# Patient Record
Sex: MALE | Race: BLACK OR AFRICAN AMERICAN | NOT HISPANIC OR LATINO | Employment: OTHER | URBAN - METROPOLITAN AREA
[De-identification: names, ages, dates, MRNs, and addresses within clinical notes are randomized per-mention and may not be internally consistent; named-entity substitution may affect disease eponyms.]

---

## 2017-03-03 ENCOUNTER — LAB VISIT (OUTPATIENT)
Dept: LAB | Facility: HOSPITAL | Age: 62
End: 2017-03-03
Attending: INTERNAL MEDICINE
Payer: COMMERCIAL

## 2017-03-03 DIAGNOSIS — I10 ESSENTIAL HYPERTENSION: Chronic | ICD-10-CM

## 2017-03-03 DIAGNOSIS — C61 PROSTATE CANCER: Chronic | ICD-10-CM

## 2017-03-03 DIAGNOSIS — E78.5 HYPERLIPIDEMIA, UNSPECIFIED HYPERLIPIDEMIA TYPE: ICD-10-CM

## 2017-03-03 LAB
ALBUMIN SERPL BCP-MCNC: 3.7 G/DL
ALP SERPL-CCNC: 50 U/L
ALT SERPL W/O P-5'-P-CCNC: 27 U/L
ANION GAP SERPL CALC-SCNC: 5 MMOL/L
AST SERPL-CCNC: 27 U/L
BILIRUB SERPL-MCNC: 0.7 MG/DL
BUN SERPL-MCNC: 20 MG/DL
CALCIUM SERPL-MCNC: 9.4 MG/DL
CHLORIDE SERPL-SCNC: 107 MMOL/L
CHOLEST/HDLC SERPL: 4.5 {RATIO}
CO2 SERPL-SCNC: 30 MMOL/L
COMPLEXED PSA SERPL-MCNC: 0.49 NG/ML
CREAT SERPL-MCNC: 1.2 MG/DL
EST. GFR  (AFRICAN AMERICAN): >60 ML/MIN/1.73 M^2
EST. GFR  (NON AFRICAN AMERICAN): >60 ML/MIN/1.73 M^2
GLUCOSE SERPL-MCNC: 99 MG/DL
HDL/CHOLESTEROL RATIO: 22.3 %
HDLC SERPL-MCNC: 157 MG/DL
HDLC SERPL-MCNC: 35 MG/DL
LDLC SERPL CALC-MCNC: 105.8 MG/DL
NONHDLC SERPL-MCNC: 122 MG/DL
POTASSIUM SERPL-SCNC: 4.4 MMOL/L
PROT SERPL-MCNC: 6.7 G/DL
SODIUM SERPL-SCNC: 142 MMOL/L
TESTOST SERPL-MCNC: 364 NG/DL
TRIGL SERPL-MCNC: 81 MG/DL

## 2017-03-03 PROCEDURE — 36415 COLL VENOUS BLD VENIPUNCTURE: CPT | Mod: PO

## 2017-03-03 PROCEDURE — 84153 ASSAY OF PSA TOTAL: CPT

## 2017-03-03 PROCEDURE — 84403 ASSAY OF TOTAL TESTOSTERONE: CPT

## 2017-03-03 PROCEDURE — 80053 COMPREHEN METABOLIC PANEL: CPT

## 2017-03-03 PROCEDURE — 80061 LIPID PANEL: CPT

## 2017-03-07 ENCOUNTER — OFFICE VISIT (OUTPATIENT)
Dept: INTERNAL MEDICINE | Facility: CLINIC | Age: 62
End: 2017-03-07
Payer: COMMERCIAL

## 2017-03-07 VITALS
BODY MASS INDEX: 28.92 KG/M2 | WEIGHT: 206.56 LBS | HEART RATE: 60 BPM | SYSTOLIC BLOOD PRESSURE: 128 MMHG | DIASTOLIC BLOOD PRESSURE: 80 MMHG | TEMPERATURE: 98 F | HEIGHT: 71 IN

## 2017-03-07 DIAGNOSIS — I25.10 CORONARY ARTERY CALCIFICATION SEEN ON CAT SCAN: Chronic | ICD-10-CM

## 2017-03-07 DIAGNOSIS — J31.0 CHRONIC RHINITIS: Chronic | ICD-10-CM

## 2017-03-07 DIAGNOSIS — E78.5 HYPERLIPIDEMIA, UNSPECIFIED HYPERLIPIDEMIA TYPE: ICD-10-CM

## 2017-03-07 DIAGNOSIS — D56.1 THALASSEMIA, BETA: ICD-10-CM

## 2017-03-07 DIAGNOSIS — I10 ESSENTIAL HYPERTENSION: Primary | Chronic | ICD-10-CM

## 2017-03-07 DIAGNOSIS — C61 PROSTATE CANCER: Chronic | ICD-10-CM

## 2017-03-07 PROCEDURE — 99999 PR PBB SHADOW E&M-EST. PATIENT-LVL III: CPT | Mod: PBBFAC,,, | Performed by: INTERNAL MEDICINE

## 2017-03-07 PROCEDURE — 3079F DIAST BP 80-89 MM HG: CPT | Mod: S$GLB,,, | Performed by: INTERNAL MEDICINE

## 2017-03-07 PROCEDURE — 99214 OFFICE O/P EST MOD 30 MIN: CPT | Mod: S$GLB,,, | Performed by: INTERNAL MEDICINE

## 2017-03-07 PROCEDURE — 3074F SYST BP LT 130 MM HG: CPT | Mod: S$GLB,,, | Performed by: INTERNAL MEDICINE

## 2017-03-07 PROCEDURE — 1160F RVW MEDS BY RX/DR IN RCRD: CPT | Mod: S$GLB,,, | Performed by: INTERNAL MEDICINE

## 2017-03-07 RX ORDER — LATANOPROST 50 UG/ML
SOLUTION/ DROPS OPHTHALMIC
Refills: 3 | COMMUNITY
Start: 2017-03-04

## 2017-03-13 PROBLEM — I25.10 CORONARY ARTERY CALCIFICATION SEEN ON CAT SCAN: Chronic | Status: ACTIVE | Noted: 2017-03-13

## 2017-03-13 NOTE — PROGRESS NOTES
Subjective:       Patient ID: Dani Gastelum Jr. is a 61 y.o. male.    Chief Complaint: Hypertension and Knee Pain    HPI   The patient presents for follow-up of hypertension and other medical conditions.  He is tolerating his blood pressure medication well without side effects.  He does exercise although he states his exercise limited due to knee pain.  He has bilateral knee joint pain.  He has had arthroscopic surgeries in the past.  He did not obtain relief with use of Synvisc.  He has coronary artery calcifications as seen on CT scan.  A stress test however was negative.  He has not experienced any exertional chest pain or dyspnea.  He also has chronic rhinitis, hyperlipidemia, prostate cancer which is being monitored by his urologist and thalassemia minor.  Review of Systems   Constitutional: Negative for activity change, appetite change, fatigue and unexpected weight change.   HENT: Positive for congestion and postnasal drip. Negative for sinus pressure and sore throat.    Eyes: Negative for visual disturbance.   Respiratory: Negative for cough, chest tightness, shortness of breath and wheezing.    Cardiovascular: Negative for chest pain, palpitations and leg swelling.   Gastrointestinal: Negative for abdominal pain, blood in stool, nausea and vomiting.   Genitourinary: Negative for dysuria, hematuria and urgency.   Musculoskeletal: Positive for arthralgias. Negative for back pain, gait problem, joint swelling, myalgias and neck stiffness.   Skin: Negative for color change and rash.   Neurological: Negative for dizziness, syncope, weakness, light-headedness, numbness and headaches.   Psychiatric/Behavioral: Negative for sleep disturbance.       Objective:      Physical Exam   Constitutional: He is oriented to person, place, and time. He appears well-developed and well-nourished. No distress.   HENT:   Head: Normocephalic and atraumatic.   Eyes: Conjunctivae and EOM are normal. No scleral icterus.   Neck: Normal  range of motion. Neck supple. No JVD present. No thyromegaly present.   Cardiovascular: Normal rate, regular rhythm, normal heart sounds and intact distal pulses.  Exam reveals no gallop and no friction rub.    No murmur heard.  Pulmonary/Chest: Effort normal and breath sounds normal. No respiratory distress. He has no wheezes. He has no rales.   Abdominal: Soft. Bowel sounds are normal. He exhibits no mass. There is no tenderness.   Musculoskeletal: Normal range of motion. He exhibits no tenderness.   No knee effusions are appreciated.   Lymphadenopathy:     He has no cervical adenopathy.   Neurological: He is alert and oriented to person, place, and time.   Gait is normal.   Skin: Skin is warm and dry. No rash noted.   Nursing note and vitals reviewed.      Results for orders placed or performed in visit on 03/03/17   Testosterone   Result Value Ref Range    Testosterone, Total 364 195.0 - 1138.0 ng/dL   Prostate Specific Antigen, Diagnostic   Result Value Ref Range    PSA DIAGNOSTIC 0.49 0.00 - 4.00 ng/mL   Lipid panel   Result Value Ref Range    Cholesterol 157 120 - 199 mg/dL    Triglycerides 81 30 - 150 mg/dL    HDL 35 (L) 40 - 75 mg/dL    LDL Cholesterol 105.8 63.0 - 159.0 mg/dL    HDL/Chol Ratio 22.3 20.0 - 50.0 %    Total Cholesterol/HDL Ratio 4.5 2.0 - 5.0    Non-HDL Cholesterol 122 mg/dL   Comprehensive metabolic panel   Result Value Ref Range    Sodium 142 136 - 145 mmol/L    Potassium 4.4 3.5 - 5.1 mmol/L    Chloride 107 95 - 110 mmol/L    CO2 30 (H) 23 - 29 mmol/L    Glucose 99 70 - 110 mg/dL    BUN, Bld 20 8 - 23 mg/dL    Creatinine 1.2 0.5 - 1.4 mg/dL    Calcium 9.4 8.7 - 10.5 mg/dL    Total Protein 6.7 6.0 - 8.4 g/dL    Albumin 3.7 3.5 - 5.2 g/dL    Total Bilirubin 0.7 0.1 - 1.0 mg/dL    Alkaline Phosphatase 50 (L) 55 - 135 U/L    AST 27 10 - 40 U/L    ALT 27 10 - 44 U/L    Anion Gap 5 (L) 8 - 16 mmol/L    eGFR if African American >60.0 >60 mL/min/1.73 m^2    eGFR if non African American >60.0 >60  mL/min/1.73 m^2       Assessment:       1. Essential hypertension    2. Hyperlipidemia, unspecified hyperlipidemia type    3. Prostate cancer    4. Thalassemia, beta    5. Chronic rhinitis    6. Coronary artery calcification seen on CAT scan        Plan:     Mr. Kuldip Henderson was seen today for hypertension and other medical conditions.  Current therapy will be continued.  He will follow-up with his orthopedist as needed.  He is to follow-up with his outside gastroenterologist for colon polyp surveillance.  Blood tests and a follow-up visit in 6 months will be obtained.      Diagnoses and all orders for this visit:    Essential hypertension  -     CBC auto differential; Future    Hyperlipidemia, unspecified hyperlipidemia type  -     Comprehensive metabolic panel; Future  -     Lipid panel; Future    Prostate cancer  -     Prostate Specific Antigen, Diagnostic; Future    Thalassemia, beta    Chronic rhinitis    Coronary artery calcification seen on CAT scan

## 2017-03-17 RX ORDER — PRAVASTATIN SODIUM 40 MG/1
TABLET ORAL
Qty: 30 TABLET | Refills: 11 | Status: SHIPPED | OUTPATIENT
Start: 2017-03-17 | End: 2021-11-19

## 2017-04-19 RX ORDER — PRAVASTATIN SODIUM 40 MG/1
TABLET ORAL
Qty: 30 TABLET | Refills: 11 | Status: SHIPPED | OUTPATIENT
Start: 2017-04-19 | End: 2017-09-07 | Stop reason: SDUPTHER

## 2017-05-11 ENCOUNTER — OFFICE VISIT (OUTPATIENT)
Dept: OTOLARYNGOLOGY | Facility: CLINIC | Age: 62
End: 2017-05-11
Payer: COMMERCIAL

## 2017-05-11 VITALS — TEMPERATURE: 98 F | DIASTOLIC BLOOD PRESSURE: 87 MMHG | HEART RATE: 79 BPM | SYSTOLIC BLOOD PRESSURE: 142 MMHG

## 2017-05-11 DIAGNOSIS — Z77.122 HISTORY OF EXPOSURE TO NOISE: ICD-10-CM

## 2017-05-11 DIAGNOSIS — H61.23 IMPACTED CERUMEN, BILATERAL: ICD-10-CM

## 2017-05-11 DIAGNOSIS — H93.11 TINNITUS OF RIGHT EAR: Primary | ICD-10-CM

## 2017-05-11 DIAGNOSIS — Z87.898 HISTORY OF VERTIGO: ICD-10-CM

## 2017-05-11 PROCEDURE — 99999 PR PBB SHADOW E&M-EST. PATIENT-LVL III: CPT | Mod: PBBFAC,,, | Performed by: OTOLARYNGOLOGY

## 2017-05-11 PROCEDURE — 3077F SYST BP >= 140 MM HG: CPT | Mod: S$GLB,,, | Performed by: OTOLARYNGOLOGY

## 2017-05-11 PROCEDURE — 69210 REMOVE IMPACTED EAR WAX UNI: CPT | Mod: S$GLB,,, | Performed by: OTOLARYNGOLOGY

## 2017-05-11 PROCEDURE — 1160F RVW MEDS BY RX/DR IN RCRD: CPT | Mod: S$GLB,,, | Performed by: OTOLARYNGOLOGY

## 2017-05-11 PROCEDURE — 3079F DIAST BP 80-89 MM HG: CPT | Mod: S$GLB,,, | Performed by: OTOLARYNGOLOGY

## 2017-05-11 PROCEDURE — 99213 OFFICE O/P EST LOW 20 MIN: CPT | Mod: 25,S$GLB,, | Performed by: OTOLARYNGOLOGY

## 2017-05-11 NOTE — MR AVS SNAPSHOT
[facility] - Otorhinolaryngology  [address]  [facility] [address]  Phone: [phone]  Fax: [phone]                  Dani Gastelum    2017 4:00 PM   Office Visit    Description:  Male : 1955   Provider:  Edgardo Jose III, MD   Department:  [facility] - Otorhinolaryngology           Diagnoses this Visit        Comments    Tinnitus of right ear    -  Primary     History of vertigo     AD BPPV 2010    Impacted cerumen, bilateral         History of exposure to noise                To Do List           Future Appointments        Provider Department Dept Phone    2017 10:00 AM Jaden Reilly Jr., [facility] - Audiology [phone]    2017 7:30 AM LAB, [facility] - Laboratory [phone]    2017 7:40 AM Frandy Hill MD [facility] Internal Medicine [phone]      Goals (5 Years of Data)     None      [facility]     [facility] On Call Nurse Care Line -  Assistance  Unless otherwise directed by your provider, please contact [facility] On-Call, our nurse care line that is available for  assistance.     Registered nurses in the [facility] Center provide: appointment scheduling, clinical advisement, health education, and other advisory services.  Call: [phone] (toll free)               Medications           Message regarding Medications     Verify the changes and/or additions to your medication regime listed below are the same as discussed with your clinician today.  If any of these changes or additions are incorrect, please notify your healthcare provider.             Verify that the below list of medications is an accurate representation of the medications you are currently taking.  If none reported, the list may be blank. If incorrect, please contact your healthcare provider. Carry this list with you in case of emergency.           Current Medications     aspirin 81 MG Chew Take 81 mg by mouth once daily.    fish oil-omega-3 fatty  acids 300-1,000 mg capsule Take 2 g by mouth once daily.    fluticasone (FLONASE) 50 mcg/actuation nasal spray SPRAY TWICE IN NOSTRIL(S) DAILY AS NEEDED FOR ALLERGIC RHINITIS    FOLIC ACID/MV,FE,OTHER MIN (CENTRUM ORAL) Take by mouth once daily.     glucosamine-chondroitin 500-400 mg tablet Take 1 tablet by mouth 3 (three) times daily. Pt taking one pill once a day.    hydrocodone-acetaminophen 7.5-325mg (NORCO) 7.5-325 mg per tablet TK ONE T PO Q 4 TO 6 H PRF PAIN    latanoprost 0.005 % ophthalmic solution INT 1 GTT IN OU QD HS    lisinopril (PRINIVIL,ZESTRIL) 30 MG tablet TAKE ONE TABLET BY MOUTH EVERY DAY    pravastatin (PRAVACHOL) 40 MG tablet TAKE 1 TABLET BY MOUTH EVERY DAY    pravastatin (PRAVACHOL) 40 MG tablet TAKE 1 TABLET BY MOUTH EVERY DAY    tadalafil (CIALIS) 5 MG tablet Take 1 tablet (5 mg total) by mouth daily as needed for Erectile Dysfunction.           Clinical Reference Information           Your Vitals Were     BP Pulse Temp             142/87 (BP Location: Left arm, Patient Position: Sitting, BP Method: Automatic) 79 98 °F (36.7 °C) (Tympanic)         Blood Pressure          Most Recent Value    BP  (!)  142/87      Allergies as of 5/11/2017     No Known Allergies      Immunizations Administered on Date of Encounter - 5/11/2017     None      [facility] Sign-Up     Activating your [facility] account is as easy as 1-2-3!     1) Visit [email or web address], select Sign Up Now, enter this activation code and your date of birth, then select Next.  [email or web address]  Expires: 6/25/2017  5:49 PM      2) Create a username and password to use when you visit [facility] in the future and select a security question in case you lose your password and select Next.    3) Enter your e-mail address and click Sign Up!    Additional Information  If you have questions, please e-mail [email or web address] or call [phone] to talk to our [facility] staff. Remember, [facility] is NOT to be used for urgent needs. For medical  emergencies, dial 911.         Instructions    Ear canals cleaned; some wax removed  Pt. deferred audiometry today  Tinnitus literature provided  Pt. remains a candidate for hearing amplification for the right ear( or both)   based on previous audiometry; hearing aid use may help with tinnitus perception  I encourage consultation with MARGARITA Reilly re: tinnitus management options       Language Assistance Services     ATTENTION: Language assistance services are available, free of charge. Please call [phone].      ATENCIÓN: Si habla keilaañol, tiene a loving disposición servicios gratuitos de asistencia lingüística. Llame al [phone].     CHÚ Ý: N?u b?n nói Ti?ng Vi?t, có các d?ch v? h? tr? ngôn ng? mi?n phí dành cho b?n. G?i s? [phone].         [facility] complies with applicable Federal civil rights laws and does not discriminate on the basis of race, color, national origin, age, disability, or sex.

## 2017-05-11 NOTE — PROGRESS NOTES
CC:Ear cleaning; tinnitus   HPI:bhavin Gastelum is a 61 year old AAM who [place] .  This was a noisy job.  He is now retired.  He presents today for an ear cleaning procedure.    One of his chief complaints his right ear tinnitus perception.  He has refused are offered an audiogram today.  During his last visit with me 7/22/15 he complained of a high-pitched tone in his right ear of several months duration off and on.  He stop taking a full 325 aspirin at that time.  His tinnitus had not dissipated.  He continues to complain of the same right ear tinnitus perception.  His last audiometric study was performed in July 2015.  He indicated a left ear greater than right high-frequency 2 through 8K sensorineural hearing loss.  SRT scores measured 20 dB for the right ear versus 15 for the left. A hearing aid consultation was recommended at that time.    The patient reminds me of his previous history of vertigo.  He was diagnosed with right-sided BPPV in November 2010.  He denies any recent vertigo episodes    Medical problem list: Chronic rhinitis, joint pain, BPH, intermittent vertigo, medial meniscus tear, hypertension, chronic low back pain, thoracic degenerative disc disease  PSH: TURP, medial meniscectomy knee surgery    PE: Blood pressure 142/87 pulse 79 temperature 98.0  Gen.: Alert and oriented gentleman in no acute distress  Both ears examined under the microscope and the micro-procedure room.  Right ear canal contains some cerumen which is carefully extracted from the floor area with a blunt curette.  Right eardrum is intact and relatively clear.  The right middle ear space is well aerated.  A larger cerumen impaction is carefully extracted from left ear canal with a blunt curette.  Left eardrum is intact and clear as visualized directly.  Left middle ear space is well aerated.      DIAGNOSIS:     ICD-10-CM ICD-9-CM    1. Tinnitus of right ear H93.11 388.30    2. History of vertigo  Z87.898 V12.49     AD BPPV 11/2010   3. Impacted cerumen, bilateral H61.23 380.4    4. History of exposure to noise Z77.122 V15.89      PLAN: Ear canals cleaned; some wax removed  Pt. deferred audiometry today  Tinnitus literature provided  Pt. remains a candidate for hearing amplification for the right ear( or both)   based on previous audiometry; hearing aid use may help with tinnitus perception  I encourage consultation with MARGARITA Reilly re: tinnitus management options

## 2017-05-11 NOTE — PATIENT INSTRUCTIONS
Ear canals cleaned; some wax removed  Pt. deferred audiometry today  Tinnitus literature provided  Pt. remains a candidate for hearing amplification for the right ear( or both)   based on previous audiometry; hearing aid use may help with tinnitus perception  I encourage consultation with MARGARITA Reilly re: tinnitus management options

## 2017-06-21 ENCOUNTER — CLINICAL SUPPORT (OUTPATIENT)
Dept: AUDIOLOGY | Facility: CLINIC | Age: 62
End: 2017-06-21
Payer: COMMERCIAL

## 2017-06-21 DIAGNOSIS — H93.11 TINNITUS, RIGHT EAR: ICD-10-CM

## 2017-06-21 PROCEDURE — 92625 TINNITUS ASSESSMENT: CPT | Mod: S$GLB,,, | Performed by: AUDIOLOGIST

## 2017-06-21 NOTE — Clinical Note
The Tinnitus Evaluation results on your patient are in Epic for your review.  Thanks for the referral.  MARGARITA Reilly, Audiologist

## 2017-06-21 NOTE — PROGRESS NOTES
"Tinnitus Evaluation    Referral Source:  Dr. Costello    Background: 61 year old black male complaining of right-sided tinnitus "off-and-on" for the past several years.  He is a reluctantly retired  (October 2016) due to chronic knee problems.  PMH is positive for migraines with aura.  The patient denies that his tinnitus is affecting his ability to get to sleep, etc.  After considerable discussion the patient admitted that his primary concern with regard to his tinnitus was whether the tinnitus was a poor prognostic indicator for additional hearing loss in his right ear.    Description of Tinnitus:"wind-like" or "tone"    Current Meds: reviewed    Previous Tinnitus Treatments: lipoflavinoids for about 1 month with uncertain effects if any    Common Tinnitus Exacerbants daily consumption (caffeine, cheese, chocolate, wine):  x3 cups of green tea/day    Tinnitus Handicap Inventory total score: dnt    Other Scale(s) administered:  Beck Depression / State-Trait Anxiety Inventory for Adults    Tinnitus Pitch Match: dnt    Minimal Masking Levels:  dnt    Hearing profile:  Reviewed most recent test.  The patient refused a hearing test at his most recent visit with Dr. Costello.      Issues and Options and reviewed and discussed: (1) the clinical implications of tinnitus as prognostic factor in progression of his right-sided hearing loss; (2)  over-the-counter treatments (lipoflavidnoids & gingko biloba); (3) the rationale for hearing aid use in management of tinnitus in general and the expected impact of hearing aid use with regard to his awareness of his tinnitus.    Recommendations:  (1) repeat hearing test for the primary purpose of monitoring the progression of his hearing loss in his right oleksandr; (2) Hearing aid use was recommended to improve communication and reduce tinnitus annoyance/awareness.    Plan:  Return as needed    The patient expressed understanding and agreement with today's recommendations and was " given my card and encouraged to return on an as-needed basis.

## 2017-09-01 ENCOUNTER — LAB VISIT (OUTPATIENT)
Dept: LAB | Facility: HOSPITAL | Age: 62
End: 2017-09-01
Attending: INTERNAL MEDICINE
Payer: COMMERCIAL

## 2017-09-01 DIAGNOSIS — C61 PROSTATE CANCER: Chronic | ICD-10-CM

## 2017-09-01 DIAGNOSIS — I10 ESSENTIAL HYPERTENSION: Chronic | ICD-10-CM

## 2017-09-01 DIAGNOSIS — E78.5 HYPERLIPIDEMIA, UNSPECIFIED HYPERLIPIDEMIA TYPE: ICD-10-CM

## 2017-09-01 LAB
ALBUMIN SERPL BCP-MCNC: 3.5 G/DL
ALP SERPL-CCNC: 44 U/L
ALT SERPL W/O P-5'-P-CCNC: 22 U/L
ANION GAP SERPL CALC-SCNC: 7 MMOL/L
AST SERPL-CCNC: 23 U/L
BASOPHILS # BLD AUTO: 0.01 K/UL
BASOPHILS NFR BLD: 0.2 %
BILIRUB SERPL-MCNC: 0.7 MG/DL
BUN SERPL-MCNC: 19 MG/DL
CALCIUM SERPL-MCNC: 9.5 MG/DL
CHLORIDE SERPL-SCNC: 106 MMOL/L
CHOLEST SERPL-MCNC: 160 MG/DL
CHOLEST/HDLC SERPL: 4 {RATIO}
CO2 SERPL-SCNC: 29 MMOL/L
COMPLEXED PSA SERPL-MCNC: 0.6 NG/ML
CREAT SERPL-MCNC: 1.3 MG/DL
DIFFERENTIAL METHOD: ABNORMAL
EOSINOPHIL # BLD AUTO: 0.1 K/UL
EOSINOPHIL NFR BLD: 2.6 %
ERYTHROCYTE [DISTWIDTH] IN BLOOD BY AUTOMATED COUNT: 14.6 %
EST. GFR  (AFRICAN AMERICAN): >60 ML/MIN/1.73 M^2
EST. GFR  (NON AFRICAN AMERICAN): 58.1 ML/MIN/1.73 M^2
GLUCOSE SERPL-MCNC: 96 MG/DL
HCT VFR BLD AUTO: 39.3 %
HDLC SERPL-MCNC: 40 MG/DL
HDLC SERPL: 25 %
HGB BLD-MCNC: 12.7 G/DL
LDLC SERPL CALC-MCNC: 108.6 MG/DL
LYMPHOCYTES # BLD AUTO: 2.6 K/UL
LYMPHOCYTES NFR BLD: 51.9 %
MCH RBC QN AUTO: 24.1 PG
MCHC RBC AUTO-ENTMCNC: 32.3 G/DL
MCV RBC AUTO: 74 FL
MONOCYTES # BLD AUTO: 0.5 K/UL
MONOCYTES NFR BLD: 9.2 %
NEUTROPHILS # BLD AUTO: 1.8 K/UL
NEUTROPHILS NFR BLD: 35.9 %
NONHDLC SERPL-MCNC: 120 MG/DL
PLATELET # BLD AUTO: 239 K/UL
PMV BLD AUTO: 9.2 FL
POTASSIUM SERPL-SCNC: 4.5 MMOL/L
PROT SERPL-MCNC: 6.6 G/DL
RBC # BLD AUTO: 5.28 M/UL
SODIUM SERPL-SCNC: 142 MMOL/L
TRIGL SERPL-MCNC: 57 MG/DL
WBC # BLD AUTO: 4.99 K/UL

## 2017-09-01 PROCEDURE — 80053 COMPREHEN METABOLIC PANEL: CPT

## 2017-09-01 PROCEDURE — 36415 COLL VENOUS BLD VENIPUNCTURE: CPT | Mod: PO

## 2017-09-01 PROCEDURE — 80061 LIPID PANEL: CPT

## 2017-09-01 PROCEDURE — 85025 COMPLETE CBC W/AUTO DIFF WBC: CPT

## 2017-09-01 PROCEDURE — 84153 ASSAY OF PSA TOTAL: CPT

## 2017-09-07 ENCOUNTER — OFFICE VISIT (OUTPATIENT)
Dept: INTERNAL MEDICINE | Facility: CLINIC | Age: 62
End: 2017-09-07
Payer: COMMERCIAL

## 2017-09-07 VITALS
TEMPERATURE: 98 F | RESPIRATION RATE: 16 BRPM | BODY MASS INDEX: 29.54 KG/M2 | HEIGHT: 71 IN | WEIGHT: 211 LBS | HEART RATE: 60 BPM | SYSTOLIC BLOOD PRESSURE: 126 MMHG | DIASTOLIC BLOOD PRESSURE: 74 MMHG

## 2017-09-07 DIAGNOSIS — I10 ESSENTIAL HYPERTENSION: Primary | Chronic | ICD-10-CM

## 2017-09-07 DIAGNOSIS — C61 PROSTATE CANCER: ICD-10-CM

## 2017-09-07 DIAGNOSIS — I25.10 CORONARY ARTERY CALCIFICATION SEEN ON CAT SCAN: Chronic | ICD-10-CM

## 2017-09-07 DIAGNOSIS — E78.5 HYPERLIPIDEMIA, UNSPECIFIED HYPERLIPIDEMIA TYPE: ICD-10-CM

## 2017-09-07 DIAGNOSIS — D56.1 THALASSEMIA, BETA: ICD-10-CM

## 2017-09-07 PROCEDURE — 3074F SYST BP LT 130 MM HG: CPT | Mod: S$GLB,,, | Performed by: INTERNAL MEDICINE

## 2017-09-07 PROCEDURE — 3008F BODY MASS INDEX DOCD: CPT | Mod: S$GLB,,, | Performed by: INTERNAL MEDICINE

## 2017-09-07 PROCEDURE — 99214 OFFICE O/P EST MOD 30 MIN: CPT | Mod: S$GLB,,, | Performed by: INTERNAL MEDICINE

## 2017-09-07 PROCEDURE — 3078F DIAST BP <80 MM HG: CPT | Mod: S$GLB,,, | Performed by: INTERNAL MEDICINE

## 2017-09-07 PROCEDURE — 99999 PR PBB SHADOW E&M-EST. PATIENT-LVL III: CPT | Mod: PBBFAC,,, | Performed by: INTERNAL MEDICINE

## 2017-09-07 RX ORDER — UBIDECARENONE 30 MG
30 CAPSULE ORAL DAILY
COMMUNITY

## 2017-09-07 NOTE — PROGRESS NOTES
Subjective:       Patient ID: Dani Gastelum Jr. is a 61 y.o. male.    Chief Complaint: Follow-up (6 month)    HPI   The patient presents for follow-up of hypertension and other medical conditions.  He is tolerating his blood pressure medication well.  He has not experienced any side effects.  He has been noting chronic tinnitus in the right ear.  He has not experienced any dizziness or loss of balance.  He denies having any earache.  He does have chronic rhinitis manifested by postnasal drainage and intermittent congestion.    We have been following his PSA levels.  He has been diagnosed to have localized prostate cancer but he does not wish to follow-up with his urologist unless his PSA levels increase.  Prostate cancer was diagnosed incidentally at the time of a TURP for BPH.  He has seen Dr. Vini Adams in the past.  He is voiding well.  Occasional episodes of nocturia reported.  He averages 7-8 hours of sleep at night.    The patient had a colonoscopy approximately 7 years ago.  He believes it was through [facility].  Review of Systems   Constitutional: Negative for activity change, appetite change, fatigue and unexpected weight change.   HENT: Positive for tinnitus. Negative for sinus pressure and sore throat.    Eyes: Negative for visual disturbance.   Respiratory: Negative for cough, chest tightness, shortness of breath and wheezing.    Cardiovascular: Negative for chest pain, palpitations and leg swelling.   Gastrointestinal: Negative for abdominal pain, blood in stool, nausea and vomiting.   Genitourinary: Negative for dysuria, hematuria and urgency.   Musculoskeletal: Negative for arthralgias, back pain, gait problem, joint swelling, myalgias and neck stiffness.   Skin: Negative for color change and rash.   Neurological: Negative for dizziness, syncope, weakness, light-headedness, numbness and headaches.   Psychiatric/Behavioral: Negative for sleep disturbance.       Objective:      Physical Exam    Constitutional: He is oriented to person, place, and time. He appears well-developed and well-nourished. No distress.   HENT:   Head: Normocephalic and atraumatic.   Eyes: Conjunctivae and EOM are normal. No scleral icterus.   Neck: Normal range of motion. Neck supple. No JVD present. No thyromegaly present.   Cardiovascular: Normal rate, regular rhythm, normal heart sounds and intact distal pulses.  Exam reveals no gallop and no friction rub.    No murmur heard.  Pulmonary/Chest: Effort normal and breath sounds normal. No respiratory distress. He has no wheezes. He has no rales.   Abdominal: Soft. Bowel sounds are normal. He exhibits no mass. There is no tenderness.   Musculoskeletal: Normal range of motion. He exhibits no tenderness.   Lymphadenopathy:     He has no cervical adenopathy.   Neurological: He is alert and oriented to person, place, and time.   Gait is normal.   Skin: Skin is warm and dry. No rash noted.   Nursing note and vitals reviewed.      Results for orders placed or performed in visit on 09/01/17   CBC auto differential   Result Value Ref Range    WBC 4.99 3.90 - 12.70 K/uL    RBC 5.28 4.60 - 6.20 M/uL    Hemoglobin 12.7 (L) 14.0 - 18.0 g/dL    Hematocrit 39.3 (L) 40.0 - 54.0 %    MCV 74 (L) 82 - 98 fL    MCH 24.1 (L) 27.0 - 31.0 pg    MCHC 32.3 32.0 - 36.0 g/dL    RDW 14.6 (H) 11.5 - 14.5 %    Platelets 239 150 - 350 K/uL    MPV 9.2 9.2 - 12.9 fL    Gran # 1.8 1.8 - 7.7 K/uL    Lymph # 2.6 1.0 - 4.8 K/uL    Mono # 0.5 0.3 - 1.0 K/uL    Eos # 0.1 0.0 - 0.5 K/uL    Baso # 0.01 0.00 - 0.20 K/uL    Gran% 35.9 (L) 38.0 - 73.0 %    Lymph% 51.9 (H) 18.0 - 48.0 %    Mono% 9.2 4.0 - 15.0 %    Eosinophil% 2.6 0.0 - 8.0 %    Basophil% 0.2 0.0 - 1.9 %    Differential Method Automated    Comprehensive metabolic panel   Result Value Ref Range    Sodium 142 136 - 145 mmol/L    Potassium 4.5 3.5 - 5.1 mmol/L    Chloride 106 95 - 110 mmol/L    CO2 29 23 - 29 mmol/L    Glucose 96 70 - 110 mg/dL    BUN, Bld 19  8 - 23 mg/dL    Creatinine 1.3 0.5 - 1.4 mg/dL    Calcium 9.5 8.7 - 10.5 mg/dL    Total Protein 6.6 6.0 - 8.4 g/dL    Albumin 3.5 3.5 - 5.2 g/dL    Total Bilirubin 0.7 0.1 - 1.0 mg/dL    Alkaline Phosphatase 44 (L) 55 - 135 U/L    AST 23 10 - 40 U/L    ALT 22 10 - 44 U/L    Anion Gap 7 (L) 8 - 16 mmol/L    eGFR if African American >60.0 >60 mL/min/1.73 m^2    eGFR if non  58.1 (A) >60 mL/min/1.73 m^2   Lipid panel   Result Value Ref Range    Cholesterol 160 120 - 199 mg/dL    Triglycerides 57 30 - 150 mg/dL    HDL 40 40 - 75 mg/dL    LDL Cholesterol 108.6 63.0 - 159.0 mg/dL    HDL/Chol Ratio 25.0 20.0 - 50.0 %    Total Cholesterol/HDL Ratio 4.0 2.0 - 5.0    Non-HDL Cholesterol 120 mg/dL   Prostate Specific Antigen, Diagnostic   Result Value Ref Range    PSA DIAGNOSTIC 0.60 0.00 - 4.00 ng/mL       Assessment:       1. Essential hypertension    2. Hyperlipidemia, unspecified hyperlipidemia type    3. Thalassemia, beta    4. Coronary artery calcification seen on CAT scan        Plan:           Dani was seen today for follow-up.  Laboratory studies will be obtained in 6 months along with a follow-up visit.  A prescription for the Zostavax shingles vaccine was given to the patient to get through his pharmacy.  Current therapy will be continued.    Diagnoses and all orders for this visit:    Essential hypertension    Hyperlipidemia, unspecified hyperlipidemia type    Thalassemia, beta    Coronary artery calcification seen on CAT scan

## 2018-01-10 RX ORDER — LISINOPRIL 30 MG/1
TABLET ORAL
Qty: 30 TABLET | Refills: 8 | Status: SHIPPED | OUTPATIENT
Start: 2018-01-10 | End: 2018-12-06 | Stop reason: SDUPTHER

## 2018-03-09 ENCOUNTER — LAB VISIT (OUTPATIENT)
Dept: LAB | Facility: HOSPITAL | Age: 63
End: 2018-03-09
Attending: INTERNAL MEDICINE
Payer: COMMERCIAL

## 2018-03-09 DIAGNOSIS — E78.5 HYPERLIPIDEMIA, UNSPECIFIED HYPERLIPIDEMIA TYPE: ICD-10-CM

## 2018-03-09 DIAGNOSIS — D56.1 THALASSEMIA, BETA: ICD-10-CM

## 2018-03-09 DIAGNOSIS — C61 PROSTATE CANCER: ICD-10-CM

## 2018-03-09 DIAGNOSIS — I10 ESSENTIAL HYPERTENSION: Chronic | ICD-10-CM

## 2018-03-09 LAB
ALBUMIN SERPL BCP-MCNC: 3.7 G/DL
ALP SERPL-CCNC: 43 U/L
ALT SERPL W/O P-5'-P-CCNC: 22 U/L
ANION GAP SERPL CALC-SCNC: 8 MMOL/L
AST SERPL-CCNC: 20 U/L
BASOPHILS # BLD AUTO: 0.03 K/UL
BASOPHILS NFR BLD: 0.5 %
BILIRUB SERPL-MCNC: 0.7 MG/DL
BUN SERPL-MCNC: 21 MG/DL
CALCIUM SERPL-MCNC: 9.4 MG/DL
CHLORIDE SERPL-SCNC: 106 MMOL/L
CHOLEST SERPL-MCNC: 162 MG/DL
CHOLEST/HDLC SERPL: 4.1 {RATIO}
CO2 SERPL-SCNC: 28 MMOL/L
COMPLEXED PSA SERPL-MCNC: 0.62 NG/ML
CREAT SERPL-MCNC: 1.3 MG/DL
DIFFERENTIAL METHOD: ABNORMAL
EOSINOPHIL # BLD AUTO: 0.2 K/UL
EOSINOPHIL NFR BLD: 2.6 %
ERYTHROCYTE [DISTWIDTH] IN BLOOD BY AUTOMATED COUNT: 14.3 %
EST. GFR  (AFRICAN AMERICAN): >60 ML/MIN/1.73 M^2
EST. GFR  (NON AFRICAN AMERICAN): 57.7 ML/MIN/1.73 M^2
FERRITIN SERPL-MCNC: 196 NG/ML
GLUCOSE SERPL-MCNC: 96 MG/DL
HCT VFR BLD AUTO: 41 %
HDLC SERPL-MCNC: 40 MG/DL
HDLC SERPL: 24.7 %
HGB BLD-MCNC: 13 G/DL
IMM GRANULOCYTES # BLD AUTO: 0.01 K/UL
IMM GRANULOCYTES NFR BLD AUTO: 0.2 %
LDLC SERPL CALC-MCNC: 107 MG/DL
LYMPHOCYTES # BLD AUTO: 2.9 K/UL
LYMPHOCYTES NFR BLD: 47.3 %
MCH RBC QN AUTO: 24 PG
MCHC RBC AUTO-ENTMCNC: 31.7 G/DL
MCV RBC AUTO: 76 FL
MONOCYTES # BLD AUTO: 0.6 K/UL
MONOCYTES NFR BLD: 10.1 %
NEUTROPHILS # BLD AUTO: 2.4 K/UL
NEUTROPHILS NFR BLD: 39.3 %
NONHDLC SERPL-MCNC: 122 MG/DL
NRBC BLD-RTO: 0 /100 WBC
PLATELET # BLD AUTO: 271 K/UL
PMV BLD AUTO: 9.8 FL
POTASSIUM SERPL-SCNC: 4.2 MMOL/L
PROT SERPL-MCNC: 6.8 G/DL
RBC # BLD AUTO: 5.42 M/UL
SODIUM SERPL-SCNC: 142 MMOL/L
TRIGL SERPL-MCNC: 75 MG/DL
WBC # BLD AUTO: 6.15 K/UL

## 2018-03-09 PROCEDURE — 84153 ASSAY OF PSA TOTAL: CPT

## 2018-03-09 PROCEDURE — 80053 COMPREHEN METABOLIC PANEL: CPT

## 2018-03-09 PROCEDURE — 36415 COLL VENOUS BLD VENIPUNCTURE: CPT | Mod: PO

## 2018-03-09 PROCEDURE — 85025 COMPLETE CBC W/AUTO DIFF WBC: CPT

## 2018-03-09 PROCEDURE — 80061 LIPID PANEL: CPT

## 2018-03-09 PROCEDURE — 82728 ASSAY OF FERRITIN: CPT

## 2018-03-16 ENCOUNTER — OFFICE VISIT (OUTPATIENT)
Dept: INTERNAL MEDICINE | Facility: CLINIC | Age: 63
End: 2018-03-16
Payer: COMMERCIAL

## 2018-03-16 VITALS
BODY MASS INDEX: 29.98 KG/M2 | HEART RATE: 64 BPM | SYSTOLIC BLOOD PRESSURE: 108 MMHG | TEMPERATURE: 98 F | WEIGHT: 214.94 LBS | RESPIRATION RATE: 14 BRPM | DIASTOLIC BLOOD PRESSURE: 74 MMHG

## 2018-03-16 DIAGNOSIS — I25.10 CORONARY ARTERY CALCIFICATION SEEN ON CAT SCAN: Chronic | ICD-10-CM

## 2018-03-16 DIAGNOSIS — E78.5 HYPERLIPIDEMIA, UNSPECIFIED HYPERLIPIDEMIA TYPE: ICD-10-CM

## 2018-03-16 DIAGNOSIS — M17.10 PRIMARY LOCALIZED OSTEOARTHROSIS OF LOWER LEG, UNSPECIFIED LATERALITY: ICD-10-CM

## 2018-03-16 DIAGNOSIS — D56.1 THALASSEMIA, BETA: ICD-10-CM

## 2018-03-16 DIAGNOSIS — M17.11 LOCALIZED PRIMARY OSTEOARTHRITIS OF RIGHT LOWER LEG: ICD-10-CM

## 2018-03-16 DIAGNOSIS — M51.34 DDD (DEGENERATIVE DISC DISEASE), THORACIC: ICD-10-CM

## 2018-03-16 DIAGNOSIS — I10 ESSENTIAL HYPERTENSION: Chronic | ICD-10-CM

## 2018-03-16 DIAGNOSIS — C61 PROSTATE CANCER: Chronic | ICD-10-CM

## 2018-03-16 DIAGNOSIS — J31.0 CHRONIC RHINITIS, UNSPECIFIED TYPE: Chronic | ICD-10-CM

## 2018-03-16 DIAGNOSIS — Z00.00 WELL ADULT EXAM: Primary | ICD-10-CM

## 2018-03-16 PROCEDURE — 99999 PR PBB SHADOW E&M-EST. PATIENT-LVL III: CPT | Mod: PBBFAC,,, | Performed by: INTERNAL MEDICINE

## 2018-03-16 PROCEDURE — 99396 PREV VISIT EST AGE 40-64: CPT | Mod: S$GLB,,, | Performed by: INTERNAL MEDICINE

## 2018-03-16 RX ORDER — DICLOFENAC SODIUM 10 MG/G
GEL TOPICAL
COMMUNITY
Start: 2018-01-13

## 2018-03-16 NOTE — PROGRESS NOTES
Subjective:       Patient ID: Dani Gastelum Jr. is a 62 y.o. male.    Chief Complaint: Annual Exam and Hypertension    HPI   The patient presents for annual physical examination.  He exercises 3 days a week.  He averages 8 hours of sleep at night.  He is a retired .  His energy level remains good.  Right knee pain has been his limiting factor and pushing his physical activity.  His diet has been stable.    He has prostate cancer which is being followed by clinical monitoring of PSA levels in symptoms.  His urine stream is good.  He expresses 1 episode of nocturia daily.    He has hypertension, hyperlipidemia, and chronic rhinitis.    The patient had a normal colonoscopy in 12/17 performed by his gastroenterologist Dr. Anthony Medeiros.    The patient has received the Zostavax shingles vaccine.  We discussed obtaining the new shingles vaccine when it becomes available.    Review of Systems   Constitutional: Negative for activity change, appetite change, fatigue and unexpected weight change.   Eyes: Negative for visual disturbance.   Respiratory: Negative for cough, chest tightness, shortness of breath and wheezing.    Cardiovascular: Negative for chest pain, palpitations and leg swelling.   Gastrointestinal: Negative for abdominal pain, blood in stool, nausea and vomiting.   Genitourinary: Negative for dysuria, hematuria and urgency.   Musculoskeletal: Negative for arthralgias, back pain, gait problem, joint swelling and myalgias.   Skin: Negative for color change and rash.   Neurological: Negative for dizziness, syncope, weakness, light-headedness, numbness and headaches.   Psychiatric/Behavioral: Negative for sleep disturbance.       Objective:      Physical Exam   Constitutional: He is oriented to person, place, and time. He appears well-developed and well-nourished. No distress.   HENT:   Head: Normocephalic and atraumatic.   Right Ear: External ear normal.   Left Ear: External ear normal.   Mouth/Throat:  Oropharynx is clear and moist. No oropharyngeal exudate.   Eyes: Conjunctivae and EOM are normal. Pupils are equal, round, and reactive to light. No scleral icterus.   Neck: Normal range of motion. Neck supple. No JVD present. No thyromegaly present.   Cardiovascular: Normal rate, regular rhythm, normal heart sounds and intact distal pulses.  Exam reveals no gallop and no friction rub.    No murmur heard.  Pulmonary/Chest: Effort normal and breath sounds normal. No respiratory distress. He has no wheezes. He has no rales.   Abdominal: Soft. Bowel sounds are normal. He exhibits no mass. There is no tenderness.   Genitourinary: Penis normal.   Genitourinary Comments:   No inguinal hernia.  No testicular masses.   Musculoskeletal: Normal range of motion. He exhibits no edema or tenderness.   Lymphadenopathy:     He has no cervical adenopathy.   Neurological: He is alert and oriented to person, place, and time. No cranial nerve deficit. He exhibits normal muscle tone.   Sensory examination is intact in both feet on monofilament and vibration testing.   Skin: Skin is warm and dry. No rash noted.   No foot lesions are present.   Psychiatric: He has a normal mood and affect. His behavior is normal.   Nursing note and vitals reviewed.      Results for orders placed or performed in visit on 03/09/18   CBC auto differential   Result Value Ref Range    WBC 6.15 3.90 - 12.70 K/uL    RBC 5.42 4.60 - 6.20 M/uL    Hemoglobin 13.0 (L) 14.0 - 18.0 g/dL    Hematocrit 41.0 40.0 - 54.0 %    MCV 76 (L) 82 - 98 fL    MCH 24.0 (L) 27.0 - 31.0 pg    MCHC 31.7 (L) 32.0 - 36.0 g/dL    RDW 14.3 11.5 - 14.5 %    Platelets 271 150 - 350 K/uL    MPV 9.8 9.2 - 12.9 fL    Immature Granulocytes 0.2 0.0 - 0.5 %    Gran # (ANC) 2.4 1.8 - 7.7 K/uL    Immature Grans (Abs) 0.01 0.00 - 0.04 K/uL    Lymph # 2.9 1.0 - 4.8 K/uL    Mono # 0.6 0.3 - 1.0 K/uL    Eos # 0.2 0.0 - 0.5 K/uL    Baso # 0.03 0.00 - 0.20 K/uL    nRBC 0 0 /100 WBC    Gran% 39.3 38.0 -  73.0 %    Lymph% 47.3 18.0 - 48.0 %    Mono% 10.1 4.0 - 15.0 %    Eosinophil% 2.6 0.0 - 8.0 %    Basophil% 0.5 0.0 - 1.9 %    Differential Method Automated    Comprehensive metabolic panel   Result Value Ref Range    Sodium 142 136 - 145 mmol/L    Potassium 4.2 3.5 - 5.1 mmol/L    Chloride 106 95 - 110 mmol/L    CO2 28 23 - 29 mmol/L    Glucose 96 70 - 110 mg/dL    BUN, Bld 21 8 - 23 mg/dL    Creatinine 1.3 0.5 - 1.4 mg/dL    Calcium 9.4 8.7 - 10.5 mg/dL    Total Protein 6.8 6.0 - 8.4 g/dL    Albumin 3.7 3.5 - 5.2 g/dL    Total Bilirubin 0.7 0.1 - 1.0 mg/dL    Alkaline Phosphatase 43 (L) 55 - 135 U/L    AST 20 10 - 40 U/L    ALT 22 10 - 44 U/L    Anion Gap 8 8 - 16 mmol/L    eGFR if African American >60.0 >60 mL/min/1.73 m^2    eGFR if non  57.7 (A) >60 mL/min/1.73 m^2   Lipid panel   Result Value Ref Range    Cholesterol 162 120 - 199 mg/dL    Triglycerides 75 30 - 150 mg/dL    HDL 40 40 - 75 mg/dL    LDL Cholesterol 107.0 63.0 - 159.0 mg/dL    HDL/Chol Ratio 24.7 20.0 - 50.0 %    Total Cholesterol/HDL Ratio 4.1 2.0 - 5.0    Non-HDL Cholesterol 122 mg/dL   Ferritin   Result Value Ref Range    Ferritin 196 20.0 - 300.0 ng/mL   Prostate Specific Antigen, Diagnostic   Result Value Ref Range    PSA DIAGNOSTIC 0.62 0.00 - 4.00 ng/mL       Assessment:       1. Well adult exam    2. Essential hypertension    3. Chronic rhinitis, unspecified type    4. Coronary artery calcification seen on CAT scan    5. Prostate cancer    6. Hyperlipidemia, unspecified hyperlipidemia type    7. Localized primary osteoarthritis of right lower leg    8. DDD (degenerative disc disease), thoracic    9. Primary localized osteoarthrosis of lower leg, unspecified laterality    10. Thalassemia, beta        Plan:       Dani Gastelum Jr. underwent annual physical exam today.  Current medical conditions are stable.  Blood tests will be repeated in 6 months along with a follow-up visit.  He has been advised to continue current therapy  and to continue current physical exercise routine.    Diagnoses and all orders for this visit:    Essential hypertension    Chronic rhinitis, unspecified type    Coronary artery calcification seen on CAT scan    Prostate cancer    Hyperlipidemia, unspecified hyperlipidemia type    Localized primary osteoarthritis of right lower leg    DDD (degenerative disc disease), thoracic    Primary localized osteoarthrosis of lower leg, unspecified laterality    Thalassemia, beta

## 2018-04-26 RX ORDER — PRAVASTATIN SODIUM 40 MG/1
TABLET ORAL
Qty: 30 TABLET | Refills: 10 | Status: SHIPPED | OUTPATIENT
Start: 2018-04-26 | End: 2018-04-26 | Stop reason: SDUPTHER

## 2018-04-27 RX ORDER — PRAVASTATIN SODIUM 40 MG/1
TABLET ORAL
Qty: 90 TABLET | Refills: 3 | Status: SHIPPED | OUTPATIENT
Start: 2018-04-27 | End: 2019-03-15 | Stop reason: SDUPTHER

## 2018-09-07 ENCOUNTER — LAB VISIT (OUTPATIENT)
Dept: LAB | Facility: HOSPITAL | Age: 63
End: 2018-09-07
Attending: INTERNAL MEDICINE
Payer: COMMERCIAL

## 2018-09-07 DIAGNOSIS — E78.5 HYPERLIPIDEMIA, UNSPECIFIED HYPERLIPIDEMIA TYPE: ICD-10-CM

## 2018-09-07 DIAGNOSIS — C61 PROSTATE CANCER: Chronic | ICD-10-CM

## 2018-09-07 LAB
ALBUMIN SERPL BCP-MCNC: 3.6 G/DL
ALP SERPL-CCNC: 43 U/L
ALT SERPL W/O P-5'-P-CCNC: 21 U/L
ANION GAP SERPL CALC-SCNC: 6 MMOL/L
AST SERPL-CCNC: 20 U/L
BILIRUB SERPL-MCNC: 0.8 MG/DL
BUN SERPL-MCNC: 17 MG/DL
CALCIUM SERPL-MCNC: 9.6 MG/DL
CHLORIDE SERPL-SCNC: 104 MMOL/L
CHOLEST SERPL-MCNC: 154 MG/DL
CHOLEST/HDLC SERPL: 4.7 {RATIO}
CO2 SERPL-SCNC: 29 MMOL/L
COMPLEXED PSA SERPL-MCNC: 0.49 NG/ML
CREAT SERPL-MCNC: 1.4 MG/DL
EST. GFR  (AFRICAN AMERICAN): >60 ML/MIN/1.73 M^2
EST. GFR  (NON AFRICAN AMERICAN): 52.7 ML/MIN/1.73 M^2
GLUCOSE SERPL-MCNC: 90 MG/DL
HDLC SERPL-MCNC: 33 MG/DL
HDLC SERPL: 21.4 %
LDLC SERPL CALC-MCNC: 110.4 MG/DL
NONHDLC SERPL-MCNC: 121 MG/DL
POTASSIUM SERPL-SCNC: 4.3 MMOL/L
PROT SERPL-MCNC: 6.7 G/DL
SODIUM SERPL-SCNC: 139 MMOL/L
TESTOST SERPL-MCNC: 356 NG/DL
TRIGL SERPL-MCNC: 53 MG/DL

## 2018-09-07 PROCEDURE — 80053 COMPREHEN METABOLIC PANEL: CPT

## 2018-09-07 PROCEDURE — 36415 COLL VENOUS BLD VENIPUNCTURE: CPT | Mod: PO

## 2018-09-07 PROCEDURE — 84403 ASSAY OF TOTAL TESTOSTERONE: CPT

## 2018-09-07 PROCEDURE — 84153 ASSAY OF PSA TOTAL: CPT

## 2018-09-07 PROCEDURE — 80061 LIPID PANEL: CPT

## 2018-09-13 ENCOUNTER — TELEPHONE (OUTPATIENT)
Dept: INTERNAL MEDICINE | Facility: CLINIC | Age: 63
End: 2018-09-13

## 2018-09-13 ENCOUNTER — OFFICE VISIT (OUTPATIENT)
Dept: INTERNAL MEDICINE | Facility: CLINIC | Age: 63
End: 2018-09-13
Payer: COMMERCIAL

## 2018-09-13 VITALS
BODY MASS INDEX: 30 KG/M2 | DIASTOLIC BLOOD PRESSURE: 74 MMHG | HEART RATE: 66 BPM | WEIGHT: 214.31 LBS | RESPIRATION RATE: 17 BRPM | TEMPERATURE: 99 F | HEIGHT: 71 IN | SYSTOLIC BLOOD PRESSURE: 132 MMHG

## 2018-09-13 DIAGNOSIS — E78.5 HYPERLIPIDEMIA, UNSPECIFIED HYPERLIPIDEMIA TYPE: ICD-10-CM

## 2018-09-13 DIAGNOSIS — D56.1 THALASSEMIA, BETA: ICD-10-CM

## 2018-09-13 DIAGNOSIS — I10 ESSENTIAL HYPERTENSION: Primary | Chronic | ICD-10-CM

## 2018-09-13 DIAGNOSIS — C61 PROSTATE CANCER: Chronic | ICD-10-CM

## 2018-09-13 DIAGNOSIS — M17.11 LOCALIZED PRIMARY OSTEOARTHRITIS OF RIGHT LOWER LEG: ICD-10-CM

## 2018-09-13 PROCEDURE — 99214 OFFICE O/P EST MOD 30 MIN: CPT | Mod: S$GLB,,, | Performed by: INTERNAL MEDICINE

## 2018-09-13 PROCEDURE — 3008F BODY MASS INDEX DOCD: CPT | Mod: CPTII,S$GLB,, | Performed by: INTERNAL MEDICINE

## 2018-09-13 PROCEDURE — 3075F SYST BP GE 130 - 139MM HG: CPT | Mod: CPTII,S$GLB,, | Performed by: INTERNAL MEDICINE

## 2018-09-13 PROCEDURE — 99999 PR PBB SHADOW E&M-EST. PATIENT-LVL III: CPT | Mod: PBBFAC,,, | Performed by: INTERNAL MEDICINE

## 2018-09-13 PROCEDURE — 3078F DIAST BP <80 MM HG: CPT | Mod: CPTII,S$GLB,, | Performed by: INTERNAL MEDICINE

## 2018-09-13 NOTE — TELEPHONE ENCOUNTER
----- Message from Jairo Moran sent at 9/13/2018  1:11 PM CDT -----  Contact: self/[phone]  Pt is calling to get a copy of his lab work that he took on today. Pt states that he forgot to  that copy. Please advise.        Thanks

## 2018-09-13 NOTE — PROGRESS NOTES
Subjective:       Patient ID: Dani Gastelum Jr. is a 62 y.o. male.    Chief Complaint: Follow-up      HPI   The patient presents for follow-up of hypertension, hyperlipidemia, osteoarthritis of the knees.  He also has chronic rhinitis which has been stable.  He has chronic low-grade joint pain in both knees.  He does have limitations with walking.  He is followed by his orthopedic specialist Dr. Pérez.  He is tolerating his blood pressure medication well without side effects.   The patient has been evaluated by his urologist Dr. Vini Sandy for prostate cancer.  His PSA levels have been monitored.  His urinary stream is good.  One episode of nocturia daily is reported. No bone pain is noted.    Review of Systems   Constitutional: Negative for activity change, appetite change, fatigue and unexpected weight change.   HENT: Positive for tinnitus (  Chronic right-sided tinnitus is reported.). Negative for sinus pressure and sore throat.    Eyes: Negative for visual disturbance.   Respiratory: Negative for cough, chest tightness, shortness of breath and wheezing.    Cardiovascular: Negative for chest pain, palpitations and leg swelling.   Gastrointestinal: Negative for abdominal pain, blood in stool, nausea and vomiting.   Genitourinary: Negative for dysuria, hematuria and urgency.   Musculoskeletal: Positive for arthralgias. Negative for back pain, gait problem, joint swelling, myalgias and neck stiffness.   Skin: Negative for color change and rash.   Neurological: Negative for dizziness, syncope, weakness, light-headedness, numbness and headaches.   Psychiatric/Behavioral: Negative for sleep disturbance.       Objective:      Physical Exam   Constitutional: He is oriented to person, place, and time. He appears well-developed and well-nourished. No distress.   HENT:   Head: Normocephalic and atraumatic.   Eyes: Conjunctivae and EOM are normal. No scleral icterus.   Neck: Normal range of motion. Neck supple. No  JVD present. No thyromegaly present.   Cardiovascular: Normal rate, regular rhythm, normal heart sounds and intact distal pulses. Exam reveals no gallop and no friction rub.   No murmur heard.  Pulmonary/Chest: Effort normal and breath sounds normal. No respiratory distress. He has no wheezes. He has no rales.   Abdominal: Soft. Bowel sounds are normal. He exhibits no mass. There is no tenderness.   Musculoskeletal: He exhibits no tenderness.   Decreased knee flexion bilaterally with crepitus present.   Lymphadenopathy:     He has no cervical adenopathy.   Neurological: He is alert and oriented to person, place, and time.   Gait is normal.   Skin: Skin is warm and dry. No rash noted.   Nursing note and vitals reviewed.      Results for orders placed or performed in visit on 09/07/18   Comprehensive metabolic panel   Result Value Ref Range    Sodium 139 136 - 145 mmol/L    Potassium 4.3 3.5 - 5.1 mmol/L    Chloride 104 95 - 110 mmol/L    CO2 29 23 - 29 mmol/L    Glucose 90 70 - 110 mg/dL    BUN, Bld 17 8 - 23 mg/dL    Creatinine 1.4 0.5 - 1.4 mg/dL    Calcium 9.6 8.7 - 10.5 mg/dL    Total Protein 6.7 6.0 - 8.4 g/dL    Albumin 3.6 3.5 - 5.2 g/dL    Total Bilirubin 0.8 0.1 - 1.0 mg/dL    Alkaline Phosphatase 43 (L) 55 - 135 U/L    AST 20 10 - 40 U/L    ALT 21 10 - 44 U/L    Anion Gap 6 (L) 8 - 16 mmol/L    eGFR if African American >60.0 >60 mL/min/1.73 m^2    eGFR if non  52.7 (A) >60 mL/min/1.73 m^2   Lipid panel   Result Value Ref Range    Cholesterol 154 120 - 199 mg/dL    Triglycerides 53 30 - 150 mg/dL    HDL 33 (L) 40 - 75 mg/dL    LDL Cholesterol 110.4 63.0 - 159.0 mg/dL    HDL/Chol Ratio 21.4 20.0 - 50.0 %    Total Cholesterol/HDL Ratio 4.7 2.0 - 5.0    Non-HDL Cholesterol 121 mg/dL   Prostate Specific Antigen, Diagnostic   Result Value Ref Range    PSA DIAGNOSTIC 0.49 0.00 - 4.00 ng/mL   Testosterone   Result Value Ref Range    Testosterone, Total 356 195.0 - 1138.0 ng/dL       Assessment:        1. Essential hypertension    2. Hyperlipidemia, unspecified hyperlipidemia type    3. Localized primary osteoarthritis of right lower leg    4. Thalassemia, beta    5. Prostate cancer        Plan:       Dani was seen today for follow-up. A form was completed for a handicap license.  Current medications will be continued.  Blood tests and a follow-up visit in 6 months will be obtained.    Diagnoses and all orders for this visit:    Essential hypertension    Hyperlipidemia, unspecified hyperlipidemia type    Localized primary osteoarthritis of right lower leg    Thalassemia, beta    Prostate cancer

## 2018-12-07 RX ORDER — LISINOPRIL 30 MG/1
TABLET ORAL
Qty: 30 TABLET | Refills: 10 | Status: SHIPPED | OUTPATIENT
Start: 2018-12-07 | End: 2019-01-22 | Stop reason: SDUPTHER

## 2019-01-21 RX ORDER — LISINOPRIL 30 MG/1
TABLET ORAL
Qty: 30 TABLET | Refills: 5 | Status: SHIPPED | OUTPATIENT
Start: 2019-01-21 | End: 2019-03-15

## 2019-02-22 ENCOUNTER — LAB VISIT (OUTPATIENT)
Dept: LAB | Facility: HOSPITAL | Age: 64
End: 2019-02-22
Attending: INTERNAL MEDICINE
Payer: COMMERCIAL

## 2019-02-22 DIAGNOSIS — C61 PROSTATE CANCER: Chronic | ICD-10-CM

## 2019-02-22 DIAGNOSIS — I10 ESSENTIAL HYPERTENSION: Chronic | ICD-10-CM

## 2019-02-22 DIAGNOSIS — E78.5 HYPERLIPIDEMIA, UNSPECIFIED HYPERLIPIDEMIA TYPE: ICD-10-CM

## 2019-02-22 LAB
ALBUMIN SERPL BCP-MCNC: 3.7 G/DL
ALP SERPL-CCNC: 40 U/L
ALT SERPL W/O P-5'-P-CCNC: 24 U/L
ANION GAP SERPL CALC-SCNC: 7 MMOL/L
AST SERPL-CCNC: 25 U/L
BASOPHILS # BLD AUTO: 0.04 K/UL
BASOPHILS NFR BLD: 0.7 %
BILIRUB SERPL-MCNC: 0.8 MG/DL
BUN SERPL-MCNC: 15 MG/DL
CALCIUM SERPL-MCNC: 9.4 MG/DL
CHLORIDE SERPL-SCNC: 105 MMOL/L
CHOLEST SERPL-MCNC: 156 MG/DL
CHOLEST/HDLC SERPL: 4.5 {RATIO}
CO2 SERPL-SCNC: 27 MMOL/L
COMPLEXED PSA SERPL-MCNC: 0.61 NG/ML
CREAT SERPL-MCNC: 1.2 MG/DL
DIFFERENTIAL METHOD: ABNORMAL
EOSINOPHIL # BLD AUTO: 0.2 K/UL
EOSINOPHIL NFR BLD: 3.4 %
ERYTHROCYTE [DISTWIDTH] IN BLOOD BY AUTOMATED COUNT: 14.2 %
EST. GFR  (AFRICAN AMERICAN): >60 ML/MIN/1.73 M^2
EST. GFR  (NON AFRICAN AMERICAN): >60 ML/MIN/1.73 M^2
GLUCOSE SERPL-MCNC: 86 MG/DL
HCT VFR BLD AUTO: 40.4 %
HDLC SERPL-MCNC: 35 MG/DL
HDLC SERPL: 22.4 %
HGB BLD-MCNC: 12.6 G/DL
IMM GRANULOCYTES # BLD AUTO: 0.02 K/UL
IMM GRANULOCYTES NFR BLD AUTO: 0.4 %
LDLC SERPL CALC-MCNC: 105.2 MG/DL
LYMPHOCYTES # BLD AUTO: 2.8 K/UL
LYMPHOCYTES NFR BLD: 52.7 %
MCH RBC QN AUTO: 23.7 PG
MCHC RBC AUTO-ENTMCNC: 31.2 G/DL
MCV RBC AUTO: 76 FL
MONOCYTES # BLD AUTO: 0.6 K/UL
MONOCYTES NFR BLD: 11.2 %
NEUTROPHILS # BLD AUTO: 1.7 K/UL
NEUTROPHILS NFR BLD: 31.6 %
NONHDLC SERPL-MCNC: 121 MG/DL
NRBC BLD-RTO: 0 /100 WBC
PLATELET # BLD AUTO: 261 K/UL
PMV BLD AUTO: 9.6 FL
POTASSIUM SERPL-SCNC: 4.2 MMOL/L
PROT SERPL-MCNC: 6.8 G/DL
RBC # BLD AUTO: 5.31 M/UL
SODIUM SERPL-SCNC: 139 MMOL/L
TRIGL SERPL-MCNC: 79 MG/DL
WBC # BLD AUTO: 5.37 K/UL

## 2019-02-22 PROCEDURE — 80053 COMPREHEN METABOLIC PANEL: CPT

## 2019-02-22 PROCEDURE — 80061 LIPID PANEL: CPT

## 2019-02-22 PROCEDURE — 84153 ASSAY OF PSA TOTAL: CPT

## 2019-02-22 PROCEDURE — 36415 COLL VENOUS BLD VENIPUNCTURE: CPT | Mod: PO

## 2019-02-22 PROCEDURE — 85025 COMPLETE CBC W/AUTO DIFF WBC: CPT

## 2019-03-15 ENCOUNTER — OFFICE VISIT (OUTPATIENT)
Dept: INTERNAL MEDICINE | Facility: CLINIC | Age: 64
End: 2019-03-15
Payer: COMMERCIAL

## 2019-03-15 ENCOUNTER — LAB VISIT (OUTPATIENT)
Dept: LAB | Facility: HOSPITAL | Age: 64
End: 2019-03-15
Attending: INTERNAL MEDICINE
Payer: COMMERCIAL

## 2019-03-15 VITALS
BODY MASS INDEX: 30.37 KG/M2 | SYSTOLIC BLOOD PRESSURE: 120 MMHG | DIASTOLIC BLOOD PRESSURE: 80 MMHG | HEIGHT: 71 IN | OXYGEN SATURATION: 95 % | HEART RATE: 63 BPM | WEIGHT: 216.94 LBS | TEMPERATURE: 98 F

## 2019-03-15 DIAGNOSIS — I10 ESSENTIAL HYPERTENSION: Primary | Chronic | ICD-10-CM

## 2019-03-15 DIAGNOSIS — D64.9 ANEMIA, UNSPECIFIED TYPE: ICD-10-CM

## 2019-03-15 DIAGNOSIS — D56.1 THALASSEMIA, BETA: ICD-10-CM

## 2019-03-15 DIAGNOSIS — Z80.7: ICD-10-CM

## 2019-03-15 DIAGNOSIS — M17.11 LOCALIZED PRIMARY OSTEOARTHRITIS OF RIGHT LOWER LEG: ICD-10-CM

## 2019-03-15 PROCEDURE — 3079F PR MOST RECENT DIASTOLIC BLOOD PRESSURE 80-89 MM HG: ICD-10-PCS | Mod: CPTII,S$GLB,, | Performed by: INTERNAL MEDICINE

## 2019-03-15 PROCEDURE — 3079F DIAST BP 80-89 MM HG: CPT | Mod: CPTII,S$GLB,, | Performed by: INTERNAL MEDICINE

## 2019-03-15 PROCEDURE — 86334 PATHOLOGIST INTERPRETATION IFE: ICD-10-PCS | Mod: 26,,, | Performed by: PATHOLOGY

## 2019-03-15 PROCEDURE — 3074F PR MOST RECENT SYSTOLIC BLOOD PRESSURE < 130 MM HG: ICD-10-PCS | Mod: CPTII,S$GLB,, | Performed by: INTERNAL MEDICINE

## 2019-03-15 PROCEDURE — 86334 IMMUNOFIX E-PHORESIS SERUM: CPT | Mod: 26,,, | Performed by: PATHOLOGY

## 2019-03-15 PROCEDURE — 1101F PT FALLS ASSESS-DOCD LE1/YR: CPT | Mod: CPTII,S$GLB,, | Performed by: INTERNAL MEDICINE

## 2019-03-15 PROCEDURE — 99214 PR OFFICE/OUTPT VISIT, EST, LEVL IV, 30-39 MIN: ICD-10-PCS | Mod: 25,S$GLB,, | Performed by: INTERNAL MEDICINE

## 2019-03-15 PROCEDURE — 1101F PR PT FALLS ASSESS DOC 0-1 FALLS W/OUT INJ PAST YR: ICD-10-PCS | Mod: CPTII,S$GLB,, | Performed by: INTERNAL MEDICINE

## 2019-03-15 PROCEDURE — 3008F PR BODY MASS INDEX (BMI) DOCUMENTED: ICD-10-PCS | Mod: CPTII,S$GLB,, | Performed by: INTERNAL MEDICINE

## 2019-03-15 PROCEDURE — 99999 PR PBB SHADOW E&M-EST. PATIENT-LVL III: CPT | Mod: PBBFAC,,, | Performed by: INTERNAL MEDICINE

## 2019-03-15 PROCEDURE — 84165 PROTEIN E-PHORESIS SERUM: CPT

## 2019-03-15 PROCEDURE — 36415 COLL VENOUS BLD VENIPUNCTURE: CPT | Mod: PO

## 2019-03-15 PROCEDURE — 99999 PR PBB SHADOW E&M-EST. PATIENT-LVL III: ICD-10-PCS | Mod: PBBFAC,,, | Performed by: INTERNAL MEDICINE

## 2019-03-15 PROCEDURE — 84165 PROTEIN E-PHORESIS SERUM: CPT | Mod: 26,,, | Performed by: PATHOLOGY

## 2019-03-15 PROCEDURE — 3008F BODY MASS INDEX DOCD: CPT | Mod: CPTII,S$GLB,, | Performed by: INTERNAL MEDICINE

## 2019-03-15 PROCEDURE — 84165 PATHOLOGIST INTERPRETATION SPE: ICD-10-PCS | Mod: 26,,, | Performed by: PATHOLOGY

## 2019-03-15 PROCEDURE — 90471 PNEUMOCOCCAL CONJUGATE VACCINE 13-VALENT LESS THAN 5YO & GREATER THAN: ICD-10-PCS | Mod: S$GLB,,, | Performed by: INTERNAL MEDICINE

## 2019-03-15 PROCEDURE — 99214 OFFICE O/P EST MOD 30 MIN: CPT | Mod: 25,S$GLB,, | Performed by: INTERNAL MEDICINE

## 2019-03-15 PROCEDURE — 90670 PCV13 VACCINE IM: CPT | Mod: S$GLB,,, | Performed by: INTERNAL MEDICINE

## 2019-03-15 PROCEDURE — 90670 PNEUMOCOCCAL CONJUGATE VACCINE 13-VALENT LESS THAN 5YO & GREATER THAN: ICD-10-PCS | Mod: S$GLB,,, | Performed by: INTERNAL MEDICINE

## 2019-03-15 PROCEDURE — 3074F SYST BP LT 130 MM HG: CPT | Mod: CPTII,S$GLB,, | Performed by: INTERNAL MEDICINE

## 2019-03-15 PROCEDURE — 86334 IMMUNOFIX E-PHORESIS SERUM: CPT

## 2019-03-15 PROCEDURE — 90471 IMMUNIZATION ADMIN: CPT | Mod: S$GLB,,, | Performed by: INTERNAL MEDICINE

## 2019-03-15 RX ORDER — OLMESARTAN MEDOXOMIL 20 MG/1
20 TABLET ORAL DAILY
Qty: 30 TABLET | Refills: 6 | Status: SHIPPED | OUTPATIENT
Start: 2019-03-15 | End: 2019-03-21 | Stop reason: RX

## 2019-03-15 NOTE — PROGRESS NOTES
Subjective:       Patient ID: Dani Gastelum Jr. is a 63 y.o. male.    Chief Complaint: Follow-up (6 month)    HPI   The patient presents for follow-up of medical conditions which include hypertension, hyperlipidemia osteoarthritis, beta thalassemia anemia.  The patient reports he has 2 brothers -ages 55 and 60- who were recently diagnosed to have multiple myeloma.  The patient has personal history of prostate cancer.  He was previously followed by his urologist.  At this time he has elected to follow his PSA levels and monitor for any symptoms.  He is doing well overall.  He is tolerating his blood pressure medication well without side effects.  Some tinnitus is noted but he denies having any loss of balance or significant hearing loss.  Osteoarthritis of the knees remains stable.  He intermittently uses Aleve for joint pain.    The patient exercises 4 days a week usually for 1-1/2 hours at each session.  He has not experienced any bone pain.    The patient is due for his next colonoscopy in 2027.    Review of Systems   Constitutional: Negative for activity change, appetite change, fatigue and unexpected weight change.   HENT: Negative for sinus pressure and sore throat.    Eyes: Negative for visual disturbance.   Respiratory: Negative for cough, chest tightness, shortness of breath and wheezing.    Cardiovascular: Negative for chest pain, palpitations and leg swelling.   Gastrointestinal: Negative for abdominal pain, blood in stool, nausea and vomiting.   Genitourinary: Negative for dysuria, hematuria and urgency.   Musculoskeletal: Negative for arthralgias, back pain, gait problem, joint swelling, myalgias and neck stiffness.   Skin: Negative for color change and rash.   Neurological: Negative for dizziness, syncope, weakness, light-headedness, numbness and headaches.   Psychiatric/Behavioral: Negative for sleep disturbance.       Objective:      Physical Exam   Constitutional: He is oriented to person, place, and  time. He appears well-developed and well-nourished. No distress.   HENT:   Head: Normocephalic and atraumatic.   Eyes: Conjunctivae and EOM are normal. No scleral icterus.   Neck: Normal range of motion. Neck supple. No JVD present. No thyromegaly present.   Cardiovascular: Normal rate, regular rhythm, normal heart sounds and intact distal pulses. Exam reveals no gallop and no friction rub.   No murmur heard.  Pulmonary/Chest: Effort normal and breath sounds normal. No respiratory distress. He has no wheezes. He has no rales.   Abdominal: Soft. Bowel sounds are normal. He exhibits no mass. There is no tenderness.   Musculoskeletal: Normal range of motion. He exhibits no tenderness.   Lymphadenopathy:     He has no cervical adenopathy.   Neurological: He is alert and oriented to person, place, and time.   Gait is normal.   Skin: Skin is warm and dry. No rash noted.   Nursing note and vitals reviewed.      Results for orders placed or performed in visit on 02/22/19   CBC auto differential   Result Value Ref Range    WBC 5.37 3.90 - 12.70 K/uL    RBC 5.31 4.60 - 6.20 M/uL    Hemoglobin 12.6 (L) 14.0 - 18.0 g/dL    Hematocrit 40.4 40.0 - 54.0 %    MCV 76 (L) 82 - 98 fL    MCH 23.7 (L) 27.0 - 31.0 pg    MCHC 31.2 (L) 32.0 - 36.0 g/dL    RDW 14.2 11.5 - 14.5 %    Platelets 261 150 - 350 K/uL    MPV 9.6 9.2 - 12.9 fL    Immature Granulocytes 0.4 0.0 - 0.5 %    Gran # (ANC) 1.7 (L) 1.8 - 7.7 K/uL    Immature Grans (Abs) 0.02 0.00 - 0.04 K/uL    Lymph # 2.8 1.0 - 4.8 K/uL    Mono # 0.6 0.3 - 1.0 K/uL    Eos # 0.2 0.0 - 0.5 K/uL    Baso # 0.04 0.00 - 0.20 K/uL    nRBC 0 0 /100 WBC    Gran% 31.6 (L) 38.0 - 73.0 %    Lymph% 52.7 (H) 18.0 - 48.0 %    Mono% 11.2 4.0 - 15.0 %    Eosinophil% 3.4 0.0 - 8.0 %    Basophil% 0.7 0.0 - 1.9 %    Differential Method Automated    Comprehensive metabolic panel   Result Value Ref Range    Sodium 139 136 - 145 mmol/L    Potassium 4.2 3.5 - 5.1 mmol/L    Chloride 105 95 - 110 mmol/L    CO2 27  23 - 29 mmol/L    Glucose 86 70 - 110 mg/dL    BUN, Bld 15 8 - 23 mg/dL    Creatinine 1.2 0.5 - 1.4 mg/dL    Calcium 9.4 8.7 - 10.5 mg/dL    Total Protein 6.8 6.0 - 8.4 g/dL    Albumin 3.7 3.5 - 5.2 g/dL    Total Bilirubin 0.8 0.1 - 1.0 mg/dL    Alkaline Phosphatase 40 (L) 55 - 135 U/L    AST 25 10 - 40 U/L    ALT 24 10 - 44 U/L    Anion Gap 7 (L) 8 - 16 mmol/L    eGFR if African American >60.0 >60 mL/min/1.73 m^2    eGFR if non African American >60.0 >60 mL/min/1.73 m^2   Lipid panel   Result Value Ref Range    Cholesterol 156 120 - 199 mg/dL    Triglycerides 79 30 - 150 mg/dL    HDL 35 (L) 40 - 75 mg/dL    LDL Cholesterol 105.2 63.0 - 159.0 mg/dL    HDL/Chol Ratio 22.4 20.0 - 50.0 %    Total Cholesterol/HDL Ratio 4.5 2.0 - 5.0    Non-HDL Cholesterol 121 mg/dL   PSA, Screening   Result Value Ref Range    PSA, SCREEN 0.61 0.00 - 4.00 ng/mL       Assessment:       1. Essential hypertension    2. Thalassemia, beta    3. Localized primary osteoarthritis of right lower leg    4. Anemia, unspecified type    5. FHx: multiple myeloma        Plan:       Dani was seen today for follow-up.  Lisinopril will be discontinued due to concerns regarding the increased incidence of lung cancer in patient's on long-term Ace inhibitors as recently reported.  Olmesartan (Benicar) will be substituted.  Prevnar -13 vaccine will be administered today.  Serum protein electrophoresis and immunoelectrophoresis will be performed in view of the strong family history of multiple myeloma.  The patient should continue blood pressure monitoring.  A follow-up visit in 2-3 months is recommended.    Diagnoses and all orders for this visit:    Essential hypertension    Thalassemia, beta    Localized primary osteoarthritis of right lower leg    Anemia, unspecified type  -     Protein electrophoresis, serum; Future  -     IMMUNOFIXATION ELECTROPHORESIS, SERUM; Future    Other orders  -     Discontinue: olmesartan (BENICAR) 20 MG tablet; Take 1 tablet  (20 mg total) by mouth once daily. For blood pressure control  -     (In Office Administered) Pneumococcal Conjugate Vaccine (13 Valent) (IM)

## 2019-03-18 LAB
ALBUMIN SERPL ELPH-MCNC: 4.21 G/DL
ALPHA1 GLOB SERPL ELPH-MCNC: 0.23 G/DL
ALPHA2 GLOB SERPL ELPH-MCNC: 0.65 G/DL
B-GLOBULIN SERPL ELPH-MCNC: 0.75 G/DL
GAMMA GLOB SERPL ELPH-MCNC: 1.16 G/DL
INTERPRETATION SERPL IFE-IMP: NORMAL
PATHOLOGIST INTERPRETATION IFE: NORMAL
PATHOLOGIST INTERPRETATION SPE: NORMAL
PROT SERPL-MCNC: 7 G/DL

## 2019-03-21 ENCOUNTER — TELEPHONE (OUTPATIENT)
Dept: INTERNAL MEDICINE | Facility: CLINIC | Age: 64
End: 2019-03-21

## 2019-03-21 RX ORDER — LOSARTAN POTASSIUM 50 MG/1
50 TABLET ORAL DAILY
Qty: 30 TABLET | Refills: 6 | Status: SHIPPED | OUTPATIENT
Start: 2019-03-21 | End: 2019-11-26 | Stop reason: SDUPTHER

## 2019-03-21 NOTE — TELEPHONE ENCOUNTER
----- Message from Ruth Ann Thomas sent at 3/21/2019  9:03 AM CDT -----  Contact: Pt self Mobile [phone]    Patient would like a call back in regards to wanting to speak with you about you changing his blood pressure medication please.

## 2019-05-20 RX ORDER — PRAVASTATIN SODIUM 40 MG/1
TABLET ORAL
Qty: 30 TABLET | Refills: 6 | Status: SHIPPED | OUTPATIENT
Start: 2019-05-20 | End: 2020-01-20

## 2019-06-28 ENCOUNTER — OFFICE VISIT (OUTPATIENT)
Dept: INTERNAL MEDICINE | Facility: CLINIC | Age: 64
End: 2019-06-28
Payer: COMMERCIAL

## 2019-06-28 VITALS
RESPIRATION RATE: 13 BRPM | WEIGHT: 215.19 LBS | DIASTOLIC BLOOD PRESSURE: 80 MMHG | HEIGHT: 71 IN | SYSTOLIC BLOOD PRESSURE: 126 MMHG | BODY MASS INDEX: 30.13 KG/M2 | OXYGEN SATURATION: 98 % | TEMPERATURE: 98 F | HEART RATE: 64 BPM

## 2019-06-28 DIAGNOSIS — E78.5 HYPERLIPIDEMIA, UNSPECIFIED HYPERLIPIDEMIA TYPE: ICD-10-CM

## 2019-06-28 DIAGNOSIS — C61 PROSTATE CANCER: ICD-10-CM

## 2019-06-28 DIAGNOSIS — D56.1 THALASSEMIA, BETA: ICD-10-CM

## 2019-06-28 DIAGNOSIS — M17.11 LOCALIZED PRIMARY OSTEOARTHRITIS OF RIGHT LOWER LEG: ICD-10-CM

## 2019-06-28 DIAGNOSIS — I10 ESSENTIAL HYPERTENSION: Primary | Chronic | ICD-10-CM

## 2019-06-28 PROCEDURE — 3074F PR MOST RECENT SYSTOLIC BLOOD PRESSURE < 130 MM HG: ICD-10-PCS | Mod: CPTII,S$GLB,, | Performed by: INTERNAL MEDICINE

## 2019-06-28 PROCEDURE — 99999 PR PBB SHADOW E&M-EST. PATIENT-LVL III: ICD-10-PCS | Mod: PBBFAC,,, | Performed by: INTERNAL MEDICINE

## 2019-06-28 PROCEDURE — 99214 OFFICE O/P EST MOD 30 MIN: CPT | Mod: S$GLB,,, | Performed by: INTERNAL MEDICINE

## 2019-06-28 PROCEDURE — 1101F PT FALLS ASSESS-DOCD LE1/YR: CPT | Mod: CPTII,S$GLB,, | Performed by: INTERNAL MEDICINE

## 2019-06-28 PROCEDURE — 1101F PR PT FALLS ASSESS DOC 0-1 FALLS W/OUT INJ PAST YR: ICD-10-PCS | Mod: CPTII,S$GLB,, | Performed by: INTERNAL MEDICINE

## 2019-06-28 PROCEDURE — 99999 PR PBB SHADOW E&M-EST. PATIENT-LVL III: CPT | Mod: PBBFAC,,, | Performed by: INTERNAL MEDICINE

## 2019-06-28 PROCEDURE — 3079F PR MOST RECENT DIASTOLIC BLOOD PRESSURE 80-89 MM HG: ICD-10-PCS | Mod: CPTII,S$GLB,, | Performed by: INTERNAL MEDICINE

## 2019-06-28 PROCEDURE — 99214 PR OFFICE/OUTPT VISIT, EST, LEVL IV, 30-39 MIN: ICD-10-PCS | Mod: S$GLB,,, | Performed by: INTERNAL MEDICINE

## 2019-06-28 PROCEDURE — 3008F PR BODY MASS INDEX (BMI) DOCUMENTED: ICD-10-PCS | Mod: CPTII,S$GLB,, | Performed by: INTERNAL MEDICINE

## 2019-06-28 PROCEDURE — 3008F BODY MASS INDEX DOCD: CPT | Mod: CPTII,S$GLB,, | Performed by: INTERNAL MEDICINE

## 2019-06-28 PROCEDURE — 3079F DIAST BP 80-89 MM HG: CPT | Mod: CPTII,S$GLB,, | Performed by: INTERNAL MEDICINE

## 2019-06-28 PROCEDURE — 3074F SYST BP LT 130 MM HG: CPT | Mod: CPTII,S$GLB,, | Performed by: INTERNAL MEDICINE

## 2019-07-13 NOTE — PROGRESS NOTES
Subjective:       Patient ID: Dani Gastelum Jr. is a 63 y.o. male.    Chief Complaint: Follow-up (3 month)    HPI   The patient presents for follow-up of medical conditions which include hypertension, hyperlipidemia, osteoarthritis of the knees, chronic tinnitus.  Beta thalassemia has also been diagnosed.  The patient has a personal history of prostate cancer.  He has been monitored by his urologist.  He has elected to follow his PSA levels and monitor for any symptoms.  Knee joint symptoms have been stable.  Pain is exacerbated by walking or prolonged standing.  He denies having any swelling.  There is no pain at rest.    The patient right exercises regularly.  He has not experienced any vertigo but he does have chronic tinnitus.    He is tolerating his blood pressure medication without side effects.    Review of Systems   Constitutional: Negative for activity change, appetite change, fatigue and unexpected weight change.   HENT: Positive for tinnitus. Negative for sinus pressure and sore throat.    Eyes: Negative for visual disturbance.   Respiratory: Negative for cough, chest tightness, shortness of breath and wheezing.    Cardiovascular: Negative for chest pain, palpitations and leg swelling.   Gastrointestinal: Negative for abdominal pain, blood in stool, nausea and vomiting.   Genitourinary: Negative for dysuria, hematuria and urgency.   Musculoskeletal: Positive for arthralgias. Negative for back pain, gait problem, joint swelling, myalgias and neck stiffness.   Skin: Negative for color change and rash.   Neurological: Negative for dizziness, syncope, weakness, light-headedness, numbness and headaches.   Psychiatric/Behavioral: Negative for sleep disturbance.       Objective:      Physical Exam   Constitutional: He is oriented to person, place, and time. He appears well-developed and well-nourished. No distress.   HENT:   Head: Normocephalic and atraumatic.   Right Ear: External ear normal.   Left Ear:  External ear normal.   Eyes: Conjunctivae and EOM are normal. No scleral icterus.   Neck: Normal range of motion. Neck supple. No JVD present. No thyromegaly present.   Cardiovascular: Normal rate, regular rhythm, normal heart sounds and intact distal pulses. Exam reveals no gallop and no friction rub.   No murmur heard.  Pulmonary/Chest: Effort normal and breath sounds normal. No respiratory distress. He has no wheezes. He has no rales.   Abdominal: Soft. Bowel sounds are normal. He exhibits no mass. There is no tenderness.   Musculoskeletal: Normal range of motion. He exhibits no tenderness.   Knee range of motion is intact.  No effusion is present.   Lymphadenopathy:     He has no cervical adenopathy.   Neurological: He is alert and oriented to person, place, and time.   Gait is normal.   Skin: Skin is warm and dry. No rash noted.   Nursing note and vitals reviewed.      Results for orders placed or performed in visit on 03/15/19   Protein electrophoresis, serum   Result Value Ref Range    Protein, Serum 7.0 6.0 - 8.4 g/dL    Albumin grams/dl 4.21 3.35 - 5.55 g/dL    Alpha-1 grams/dl 0.23 0.17 - 0.41 g/dL    Alpha-2 grams/dl 0.65 0.43 - 0.99 g/dL    Beta grams/dl 0.75 0.50 - 1.10 g/dL    Gamma grams/dl 1.16 0.67 - 1.58 g/dL   IMMUNOFIXATION ELECTROPHORESIS, SERUM   Result Value Ref Range    Immunofix Interp. SEE COMMENT    Pathologist Interpretation SPE   Result Value Ref Range    Pathologist Interpretation SPE REVIEWED    Pathologist Interpretation BRIAN   Result Value Ref Range    Pathologist Interpretation BRIAN REVIEWED        Assessment:       1. Essential hypertension    2. Localized primary osteoarthritis of right lower leg    3. Thalassemia, beta    4. Hyperlipidemia, unspecified hyperlipidemia type    5. Prostate cancer        Plan:     Dani was seen today for follow-up.  There is no clinical or laboratory evidence of multiple myeloma.  Fasting blood tests and a follow-up visit in 6 months will be obtained.   Current therapy will be continued.    Diagnoses and all orders for this visit:    Essential hypertension  -     CBC auto differential; Future    Localized primary osteoarthritis of right lower leg    Thalassemia, beta    Hyperlipidemia, unspecified hyperlipidemia type  -     Comprehensive metabolic panel; Future  -     Lipid panel; Future    Prostate cancer  -     Prostate Specific Antigen, Diagnostic; Future

## 2019-10-03 ENCOUNTER — TELEPHONE (OUTPATIENT)
Dept: NEUROSURGERY | Facility: CLINIC | Age: 64
End: 2019-10-03

## 2019-10-03 DIAGNOSIS — M54.2 NECK PAIN: Primary | ICD-10-CM

## 2019-10-03 DIAGNOSIS — M54.9 BACK PAIN, UNSPECIFIED BACK LOCATION, UNSPECIFIED BACK PAIN LATERALITY, UNSPECIFIED CHRONICITY: ICD-10-CM

## 2019-10-03 NOTE — TELEPHONE ENCOUNTER
Spoke with Mr. Gastelum, appointment rescheduled with Isaac. Patient doesn't have any imaging.

## 2019-10-03 NOTE — TELEPHONE ENCOUNTER
----- Message from Dion Mosley sent at 10/2/2019  7:21 PM CDT -----  Contact: Pt  Pt would like to be called back regarding MRI testing.    Pt can be reached at [phone].    Thank You.

## 2019-10-15 ENCOUNTER — PATIENT OUTREACH (OUTPATIENT)
Dept: ADMINISTRATIVE | Facility: OTHER | Age: 64
End: 2019-10-15

## 2019-10-16 ENCOUNTER — HOSPITAL ENCOUNTER (OUTPATIENT)
Dept: RADIOLOGY | Facility: HOSPITAL | Age: 64
Discharge: HOME | End: 2019-10-16
Attending: PHYSICIAN ASSISTANT
Payer: COMMERCIAL

## 2019-10-16 ENCOUNTER — OFFICE VISIT (OUTPATIENT)
Dept: NEUROSURGERY | Facility: CLINIC | Age: 64
End: 2019-10-16
Payer: COMMERCIAL

## 2019-10-16 VITALS
SYSTOLIC BLOOD PRESSURE: 147 MMHG | HEART RATE: 69 BPM | WEIGHT: 215 LBS | DIASTOLIC BLOOD PRESSURE: 80 MMHG | TEMPERATURE: 99 F | BODY MASS INDEX: 30.1 KG/M2 | HEIGHT: 71 IN

## 2019-10-16 DIAGNOSIS — M51.36 DDD (DEGENERATIVE DISC DISEASE), LUMBAR: ICD-10-CM

## 2019-10-16 DIAGNOSIS — M50.30 DDD (DEGENERATIVE DISC DISEASE), CERVICAL: Primary | ICD-10-CM

## 2019-10-16 DIAGNOSIS — M79.18 MYOFASCIAL PAIN: ICD-10-CM

## 2019-10-16 DIAGNOSIS — M54.9 BACK PAIN, UNSPECIFIED BACK LOCATION, UNSPECIFIED BACK PAIN LATERALITY, UNSPECIFIED CHRONICITY: ICD-10-CM

## 2019-10-16 DIAGNOSIS — M53.3 SACROILIAC JOINT DYSFUNCTION OF RIGHT SIDE: ICD-10-CM

## 2019-10-16 DIAGNOSIS — M54.2 NECK PAIN: ICD-10-CM

## 2019-10-16 PROCEDURE — 72120 X-RAY BEND ONLY L-S SPINE: CPT | Mod: 26,,, | Performed by: RADIOLOGY

## 2019-10-16 PROCEDURE — 72050 XR CERVICAL SPINE AP LAT WITH FLEX EXTEN: ICD-10-PCS | Mod: 26,,, | Performed by: RADIOLOGY

## 2019-10-16 PROCEDURE — 3077F SYST BP >= 140 MM HG: CPT | Mod: CPTII,S$GLB,, | Performed by: PHYSICIAN ASSISTANT

## 2019-10-16 PROCEDURE — 72120 X-RAY BEND ONLY L-S SPINE: CPT | Mod: TC,PN

## 2019-10-16 PROCEDURE — 72120 XR LUMBAR SPINE AP AND LAT WITH FLEX/EXT: ICD-10-PCS | Mod: 26,,, | Performed by: RADIOLOGY

## 2019-10-16 PROCEDURE — 72100 XR LUMBAR SPINE AP AND LAT WITH FLEX/EXT: ICD-10-PCS | Mod: 26,,, | Performed by: RADIOLOGY

## 2019-10-16 PROCEDURE — 1100F PR PT FALLS ASSESS DOC 2+ FALLS/FALL W/INJURY/YR: ICD-10-PCS | Mod: CPTII,S$GLB,, | Performed by: PHYSICIAN ASSISTANT

## 2019-10-16 PROCEDURE — 1100F PTFALLS ASSESS-DOCD GE2>/YR: CPT | Mod: CPTII,S$GLB,, | Performed by: PHYSICIAN ASSISTANT

## 2019-10-16 PROCEDURE — 99204 OFFICE O/P NEW MOD 45 MIN: CPT | Mod: S$GLB,,, | Performed by: PHYSICIAN ASSISTANT

## 2019-10-16 PROCEDURE — 99999 PR PBB SHADOW E&M-EST. PATIENT-LVL IV: ICD-10-PCS | Mod: PBBFAC,,, | Performed by: PHYSICIAN ASSISTANT

## 2019-10-16 PROCEDURE — 3288F FALL RISK ASSESSMENT DOCD: CPT | Mod: CPTII,S$GLB,, | Performed by: PHYSICIAN ASSISTANT

## 2019-10-16 PROCEDURE — 3077F PR MOST RECENT SYSTOLIC BLOOD PRESSURE >= 140 MM HG: ICD-10-PCS | Mod: CPTII,S$GLB,, | Performed by: PHYSICIAN ASSISTANT

## 2019-10-16 PROCEDURE — 72100 X-RAY EXAM L-S SPINE 2/3 VWS: CPT | Mod: 26,,, | Performed by: RADIOLOGY

## 2019-10-16 PROCEDURE — 99999 PR PBB SHADOW E&M-EST. PATIENT-LVL IV: CPT | Mod: PBBFAC,,, | Performed by: PHYSICIAN ASSISTANT

## 2019-10-16 PROCEDURE — 3288F PR FALLS RISK ASSESSMENT DOCUMENTED: ICD-10-PCS | Mod: CPTII,S$GLB,, | Performed by: PHYSICIAN ASSISTANT

## 2019-10-16 PROCEDURE — 3079F DIAST BP 80-89 MM HG: CPT | Mod: CPTII,S$GLB,, | Performed by: PHYSICIAN ASSISTANT

## 2019-10-16 PROCEDURE — 99204 PR OFFICE/OUTPT VISIT, NEW, LEVL IV, 45-59 MIN: ICD-10-PCS | Mod: S$GLB,,, | Performed by: PHYSICIAN ASSISTANT

## 2019-10-16 PROCEDURE — 72050 X-RAY EXAM NECK SPINE 4/5VWS: CPT | Mod: 26,,, | Performed by: RADIOLOGY

## 2019-10-16 PROCEDURE — 72050 X-RAY EXAM NECK SPINE 4/5VWS: CPT | Mod: TC,PN

## 2019-10-16 PROCEDURE — 3079F PR MOST RECENT DIASTOLIC BLOOD PRESSURE 80-89 MM HG: ICD-10-PCS | Mod: CPTII,S$GLB,, | Performed by: PHYSICIAN ASSISTANT

## 2019-10-16 RX ORDER — METHOCARBAMOL 750 MG/1
750 TABLET, FILM COATED ORAL 3 TIMES DAILY PRN
Qty: 60 TABLET | Refills: 0 | Status: SHIPPED | OUTPATIENT
Start: 2019-10-16 | End: 2019-10-26

## 2019-10-16 NOTE — PROGRESS NOTES
History & Physical    SUBJECTIVE:     Chief Complaint:  Neck pain > right low back pain    History of Present Illness:  Dani Gastelum Jr. is 64 y.o.  male with history of previous , scoliosis, glaucoma, coronary artery disease on preventive 81 mg aspirin, hypertension, hyperlipidemia, right ear tinnitus, previous bilateral knee surgery for meniscal repair, and previous right rotator cuff surgery who presents for neurosurgical evaluation, self-referral.  Patient has long history of posterior neck pain for over 5 years and has worsened in the last year.  His Gram is a constant aching and rated as 4-5/10.  Pain worsens when lying down flat or turning his head.  Intermittently, he has numbness and tingling and both 4th/5th fingers; symptoms resolved spontaneously.  He takes Aleve when pain is severe and is very active (goes to the gym 3 days a week).  Denies any radicular arm pain/weakness, hand  weakness, loss of finger dexterity, bladder/bowel incontinence, or gait instability.    He also has in right low back/SI joint pain and right groin pain.  This occurs 4-5 times a month for about 5-6 years now.  Pain only occurs when he transition from sitting to standing position.  Denies any leg weakness, bladder/bowel incontinence, radicular leg pain, or saddle anesthesia.          Low Back Pain Scale  R Low Back-Pain Score: 3  R Low Back-Pain Intensity: Pain killers give moderate relief from pain  R Low Back-Pain Score: I can look after myself normally but it causes extra pain  Low Back-Lifting: I can lift heavy weights but it gives extra pain   Low Back-Walking: Pain prevents me walking more than .5 mile   Low Back-Sitting: I can sit in any chair as long as I like   Low Back-Standing: I cannot stand for longer than 30 mins without increasing pain   Low Back-Sleeping: I have pain in bed but it does not prevent me from sleeping well   Low Back-Social Life: My social life is normal but it increases the degree of  pain   Low Back-Traveling: I have extra pain while traveling which compels me to seek alternate forms of travel   Low Back-Changing Degree of Pain: My pain seems to be getting better but improvement is slow    Neck Disability  Neck Disability-Pain Score: 5  Neck Disability-Pain Intensity: The pain is very mild at the moment  Neck Disability-Personal Care: I can look after myself but it causes extra pain  Neck Disability-Lifting: I can lift heavy weights but it gives extra pain  Neck Disability-Reading: I can read as much as I want to, with slight pain in my neck  Neck Disability-Headaches: I have slight headaches that come infrequently  Neck Disability-Concentration: I can concentrate fully, when I want to, with no difficulty  Neck Disability-work: I can do most of my usual work, but no more  Neck Disability-Driving: I can drive my car as long as I want with slight pain in my neck  Neck Disability-Sleeping: I have no trouble sleeping  Neck Disability-Recreation: I am able to engage in most but not all of my usual recreation activiites because of pain in my neck      Review of patient's allergies indicates:  No Known Allergies    Current Outpatient Medications   Medication Sig Dispense Refill    latanoprost 0.005 % ophthalmic solution INT 1 GTT IN OU QD HS  3    losartan (COZAAR) 50 MG tablet Take 1 tablet (50 mg total) by mouth once daily. For blood pressure control 30 tablet 6    pravastatin (PRAVACHOL) 40 MG tablet TAKE 1 TABLET BY MOUTH EVERY DAY 30 tablet 6    aspirin 81 MG Chew Take 81 mg by mouth once daily.      co-enzyme Q-10 30 mg capsule Take 30 mg by mouth once daily.      fish oil-omega-3 fatty acids 300-1,000 mg capsule Take 2 g by mouth once daily.      fluticasone (FLONASE) 50 mcg/actuation nasal spray SPRAY TWICE IN NOSTRIL(S) DAILY AS NEEDED FOR ALLERGIC RHINITIS (Patient not taking: Reported on 10/16/2019) 1 Bottle 6    FOLIC ACID/MV,FE,OTHER MIN (CENTRUM ORAL) Take by mouth once daily.        glucosamine-chondroitin 500-400 mg tablet Take 1 tablet by mouth 3 (three) times daily. Pt taking one pill once a day.      hydrocodone-acetaminophen 7.5-325mg (NORCO) 7.5-325 mg per tablet TK ONE T PO Q 4 TO 6 H PRF PAIN  0    methocarbamol (ROBAXIN) 750 MG Tab Take 1 tablet (750 mg total) by mouth 3 (three) times daily as needed (muscle spasms). 60 tablet 0    pravastatin (PRAVACHOL) 40 MG tablet TAKE 1 TABLET BY MOUTH EVERY DAY 30 tablet 11    VOLTAREN 1 % Gel        No current facility-administered medications for this visit.        Past Medical History:   Diagnosis Date    ALLERGIC RHINITIS     Allergy     BPH (benign prostatic hypertrophy)     Hyperlipidemia     Hypertension     Thalassemia, beta     Vertigo, intermittent      Past Surgical History:   Procedure Laterality Date    KNEE SURGERY Right     NASAL SEPTUM SURGERY      SHOULDER SURGERY Right     right rotator cuff    TRANSURETHRAL RESECTION OF PROSTATE      VASECTOMY       Family History     Problem Relation (Age of Onset)    Cancer Father, Brother, Brother    Gout Mother    Heart failure Maternal Grandmother, Paternal Grandfather    Hyperlipidemia Mother, Father    Hypertension Mother, Father    Kidney disease Father        Social History     Socioeconomic History    Marital status:      Spouse name: Not on file    Number of children: Not on file    Years of education: Not on file    Highest education level: Not on file   Occupational History    Occupation:      Employer: [employer]   Social Needs    Financial resource strain: Not on file    Food insecurity:     Worry: Not on file     Inability: Not on file    Transportation needs:     Medical: Not on file     Non-medical: Not on file   Tobacco Use    Smoking status: Never Smoker    Smokeless tobacco: Never Used   Substance and Sexual Activity    Alcohol use: No    Drug use: No    Sexual activity: Yes     Partners: Female     Birth  "control/protection: None   Lifestyle    Physical activity:     Days per week: Not on file     Minutes per session: Not on file    Stress: Not on file   Relationships    Social connections:     Talks on phone: Not on file     Gets together: Not on file     Attends Shinto service: Not on file     Active member of club or organization: Not on file     Attends meetings of clubs or organizations: Not on file     Relationship status: Not on file   Other Topics Concern    Not on file   Social History Narrative    Not on file       Review of Systems:  Review of Systems    Constitutional: no fever, chills or night sweats. No changes in weight   Eyes: no visual changes   ENT: no nasal congestion or sore throat   Respiratory: no cough or shortness of breath   Cardiovascular: no chest pain or palpitations   Gastrointestinal: no nausea or vomiting   Genitourinary: no hematuria or dysuria   Integument/Breast: no rash or pruritis   Hematologic/Lymphatic: no easy bruising or lymphadenopathy   Musculoskeletal: no arthralgias or myalgias.  Positive for neck pain and positive right SI/low back pain  Neurological: no seizures or tremors. No weakness or paresthesia.   Behavioral/Psych: no auditory or visual hallucinations   Endocrine: no heat or cold intolerance       OBJECTIVE:     Vital Signs  Temp: 98.7 °F (37.1 °C)  Pulse: 69  BP: (!) 147/80  Pain Score:   4  Height: 5' 11" (180.3 cm)  Weight: 97.5 kg (215 lb)  Body mass index is 29.99 kg/m².      Physical Exam:  Neurosurgery Physical Exam    General: well developed, well nourished, no distress.   Neurologic: Awake, alert and oriented x3. Thought content appropriate.  GCS: Motor: 6/Verbal: 5/Eyes: 4 GCS Total: 15  Cranial nerves: II-XII grossly intact. PERRLA. EOMI without nystagmus. Face symmetric and sensation intact to light touch, tongue midline, shoulder shrug symmetric bilaterally.  Hearing equal bilaterally to finger rub. Palate and uvula rise and fall normally in " midline.    Language: no aphasia  Speech: no dysarthria   Neck: supple, without obvious masses    Sensory: intact to light touch B/L UE and LE  Motor Strength: Moves all extremities spontaneously with good tone. Full strength upper and lower extremities. No abnormal movements seen.    Strength  Deltoids Triceps Biceps Wrist Extension Wrist Flexion Hand    Upper: R 5/5 5/5 5/5 5/5 5/5 5/5    L 5/5 5/5 5/5 5/5 5/5 5/5     Iliopsoas Quadriceps Knee  Flexion Tibialis  anterior Gastro- cnemius EHL   Lower: R 5/5 5/5 5/5 5/5 5/5 5/5    L 5/5 5/5 5/5 5/5 5/5 5/5     Interossi muscle strength- intact    DTR's - 1+ and symmetric in UE and LE  Recio's - Negative        Lhermitte's - Negative  Spurlings-   Negative         Ankle Clonus - Negative           Babinski  - Negative     SLR - Negative   Gait - normal      Tandem Gait - No difficulty    Able to walk/stand on heels & toes  Cervical ROM - limits; pain with all neck ROM  Lumbar ROM - full; no pain with all ROM  No focal numbness or weakness  No midline or paraspinal tenderness to palpation  No difficulty transitioning from seated to standing position or vice versa.  ENT: normal hearing with finger rub  Heart: RRR, no cyanosis, pallor, or edema.   Lungs- normal respiratory effort  Abdomen-  soft, symmetric and nontender  Skin: grossly intact in all 4 extremities without obvious rashes or lesions  Extremities: warm with no cyanosis or edema, or clubbing  Pulses: palpable distal pulses    SI Joint tenderness - Negative  Distraction test-negative  Compression test-negative       Gaenslen's test- negative       Greater Trochanter Joint tenderness - Negative  Adrien's Test - reproduces pain to bilateral hip   Pain on Hip ROM - Negative  Tinel's - Negative Phalen's - Negative    Posture-  No obvious kyphosis with standing posture.  With bending posture, no obvious scapula wing        Diagnostic Results:  All imaging personally reviewed    Cervical x-rays  10/16/2019  Straightening of cervical lordosis.  Multilevel mild disc disease.    Lumbar spine x-ray 10/16/2019  Lumbar lordosis well maintained.  Mild facet arthropathy and disc disease at L5-S1.    CT abdomen and pelvis 03/30/2015 reviewed for spinal aspect  High pelvic incidence.  Mild L5-S1 disc disease that is chronic; focal calcification present at posterior disc.  ASSESSMENT/PLAN:     64 y.o.  male with history of previous , scoliosis, glaucoma, coronary artery disease on preventive 81 mg aspirin, hypertension, hyperlipidemia, right ear tinnitus, previous bilateral knee surgery for meniscal repair, and previous right rotator cuff surgery who presents worsening neck pain, imminent finger paresthesia, and intermittent right SI joint pain.    -start with conservative treatment with physical therapy for neck/low back/SI joint and continue OTC anti-inflammatory p.r.n. Pain.  -Robaxin p.r.n. muscle spasms.  -return to clinic in 6-8 weeks with MRI C-spine to rule out any cervical stenosis that is contributing to his finger paresthesias.      All imaging were reviewed with patient and staff. All questions were answered.  Patient verbalized understanding and agreed with the above plan of care.  Patient was encouraged to call clinic with any future concerns prior to follow up appt.    Please call with any questions.    Discussed with Dr. Will and he agreed with the above plan of care.     Isaac Cesar PA-C  Neurosurgery      Note dictated with voice recognition software, please excuse any grammatical errors.

## 2019-11-06 ENCOUNTER — CLINICAL SUPPORT (OUTPATIENT)
Dept: REHABILITATION | Facility: HOSPITAL | Age: 64
End: 2019-11-06
Payer: COMMERCIAL

## 2019-11-06 DIAGNOSIS — G89.29 CHRONIC LEFT-SIDED LOW BACK PAIN WITHOUT SCIATICA: ICD-10-CM

## 2019-11-06 DIAGNOSIS — M54.50 CHRONIC LEFT-SIDED LOW BACK PAIN WITHOUT SCIATICA: ICD-10-CM

## 2019-11-06 DIAGNOSIS — M54.2 NECK PAIN: ICD-10-CM

## 2019-11-06 PROCEDURE — 97161 PT EVAL LOW COMPLEX 20 MIN: CPT | Mod: PO

## 2019-11-06 PROCEDURE — 97530 THERAPEUTIC ACTIVITIES: CPT | Mod: PO

## 2019-11-06 NOTE — PROGRESS NOTES
[facility]  Physical Therapy Initial Evaluation    Name: Dani Gastelum Jr.  Clinic Number: [id number]    Therapy Diagnosis:   Encounter Diagnoses   Name Primary?    Neck pain     Chronic left-sided low back pain without sciatica      Physician: Isaac Cesar PA-C    Physician Orders: PT Eval and Treat   Medical Diagnosis from Referral: DDD (degenerative disc disease), cervical  Evaluation Date: 11/6/2019  Authorization Period Expiration: 12/31/2019  Plan of Care Expiration: 12/31/2019  Visit # / Visits authorized: 1/ 20    Time In: 2:30  Time Out: 3:30  Total Billable Time: 60 minutes    Precautions: Standard    Subjective   Date of onset: Pt reported that his neck pain has increase over the past few months  History of current condition - Dani reports: Pt reports a Hx of neck and lower back problem.  HE also reports a Hx for gym exercise that is sometimes limited by his neck and lower back pain.     Medical History:   Past Medical History:   Diagnosis Date    ALLERGIC RHINITIS     Allergy     BPH (benign prostatic hypertrophy)     Hyperlipidemia     Hypertension     Thalassemia, beta     Vertigo, intermittent        Surgical History:   Dani Gastelum Jr.  has a past surgical history that includes Shoulder surgery (Right); Nasal septum surgery; Vasectomy; Knee surgery (Right); and Transurethral resection of prostate.    Medications:   Dani has a current medication list which includes the following prescription(s): aspirin, co-enzyme q-10, fish oil-omega-3 fatty acids, fluticasone propionate, multivitamin/iron/folic acid, glucosamine-chondroitin, hydrocodone-acetaminophen, latanoprost, losartan, pravastatin, pravastatin, and voltaren.    Allergies:   Review of patient's allergies indicates:  No Known Allergies     Imaging, Lumbar and Cx X-rays    Prior Therapy: yes  Social History: no stairs at home, but stairs at the gym.  Pt lives with their spouse  Occupation: Retired Fire  "fighter  Prior Level of Function: Independent in all ADL's  Current Level of Function: difficulty going to sleep    Pain:  Current 2/10, worst 6/10, best 2/10   Location: bilateral neck and L side of lower back  Description: Aching  Aggravating Factors: neck pain after working out and with turning his neck while driving  Easing Factors: OTC medication and caffeine    Pts goals: To be able to exercise and perform his daily activities pain free.    Objective     Observation: Pt was able to enter the clinic ambulating independently without an assistive device.    Posture: Pt with an"S" curve scoliosis with the L side of his pelvis elevated.  L "C:" curve in his thoracic spine with a compensatory curve in the upper thoracic and Cx spine.    Cervical Range of Motion:    Degrees Pain   Flexion 50 yes     Extension 45 yes     Right Rotation nt nt     Left Rotation nt nt     Right Side Bending 27 yes   Left Side Bending 23 yes      Shoulder Range of Motion:   Shoulder Left Right   Flexion nt nt   Abduction nt nt   ER nt nt   IR nt nt     Strength:  Cervical MMT   Flexion nt   Extension nt   Right Side Bend 4+/5   Left Side Bend 4+/5     Upper Extremity Strength  (R) UE  (L) UE    Shoulder flexion: 4/5 Shoulder flexion: 5/5   Shoulder Abduction: 3+/5 Shoulder abduction: 4+/5   Shoulder ER 3+/5 Shoulder ER 4+/5   Shoulder IR 4+/5 Shoulder IR 5/5   Elbow flexion: 5/5 Elbow flexion: 5/5   Elbow extension: 5/5 Elbow extension: 5/5   Wrist flexion: 5/5 Wrist flexion: 5/5   Wrist extension: 5/5 Wrist extension: 5/5    5/5 : 5/5   Lower Trap nt Lower Trap nt   Middle Trap nt Middle Trap nt   Rhomboids nt Rhomboids nt         Special Tests:  Distraction (+)   Compression nt   Spurlings nt   Sharp-Debbie nt   VA test nt   Lateral Flexion Alar Ligament nt   DNF test nt       Joint Mobility: Cx spine hypomobilie    Thoracic mobility: Hypomobile      Sensation: Reported intact    Flexibility: nt    TREATMENT   Treatment Time " "In: 3:00  Treatment Time Out: 3:30  Total Treatment time separate from Evaluation: 30 minutes    Dani received therapeutic exercises to develop strength, endurance and posture for 20 minutes including:  Scapular retractions  Gluteal sets  Abdominal bracing  Discussed Pt's gym workout and the fact the he is already performing seated rows and lat pull downs and how he can incorporate core strengthening in his program by engaging his abdominals and gluteals while he is performing his present exercise program.    Dani received the following manual therapy techniques: Joint mobilizations, Manual traction and Soft tissue Mobilization were applied to the: Cx and thoracic spine for 10 minutes, including:  Manual Cx traction  Thoracic spine and rib cage passive mobilization  Soft tissue mobilization thoracic and Cx musculature.      Home Exercises and Patient Education Provided    Education provided:   - yes    Written Home Exercises Provided: no.  Pt was provided verbal instruction in a home/gym exercise program  Exercises were reviewed and Dani was able to demonstrate them prior to the end of the session.  Dani demonstrated good  understanding of the education provided.     See EMR under for exercises provided Pt was provided verbal a suggestions on how he can begin to address his Neck and back Sx.    Assessment   Dani is a 64 y.o. male referred to outpatient Physical Therapy with a medical diagnosis of DDD (degenerative disc disease), cervical. Pt presents with an "S" curve thoracic and Cx spine scoliosis and c/o neck and lower back pain.    Pt prognosis is Good.   Pt will benefit from skilled outpatient Physical Therapy to address the deficits stated above and in the chart below, provide pt/family education, and to maximize pt's level of independence.     Plan of care discussed with patient: Yes  Pt's spiritual, cultural and educational needs considered and patient is agreeable to the plan of care and goals as " stated below:     Anticipated Barriers for therapy: none    Medical Necessity is demonstrated by the following  History  Co-morbidities and personal factors that may impact the plan of care Co-morbidities:   advanced age and HTN    Personal Factors:   age  coping style     low   Examination  Body Structures and Functions, activity limitations and participation restrictions that may impact the plan of care Body Regions:   neck  back  trunk    Body Systems:    gross symmetry  ROM  strength    Participation Restrictions:   No deficits    Activity limitations:   Learning and applying knowledge  no deficits    General Tasks and Commands  no deficits    Communication  no deficits    Mobility  lifting and carrying objects    Self care  no deficits    Domestic Life  no deficits    Interactions/Relationships  no deficits    Life Areas  no deficits    Community and Social Life  no deficits         low   Clinical Presentation stable and uncomplicated low   Decision Making/ Complexity Score: low     Goals:  Short Term Goals: 3 weeks   Pt will be independent in a HEP to address his functional deficits  Pt with have an improved sense of postural awareness     Long Term Goals: 8 weeks   Pt will report a decrease in his level of pain, at worst, to </= 2/10  Pt will be able to perform AROM of the Cx spine WFL and pain free  Pt is to report that he is able to manage his neck and back Sx through a home/gym exercise program.    Plan   Plan of care Certification: 11/6/2019 to 12/31/2019.    Outpatient Physical Therapy 2 times weekly for 8 weeks to include the following interventions: Cervical/Lumbar Traction, Electrical Stimulation IFC/[place], Manual Therapy, Moist Heat/ Ice, Neuromuscular Re-ed, Patient Education, Self Care, Therapeutic Activites, Therapeutic Exercise and dry needling.     Duran Franklin, PT

## 2019-11-10 NOTE — PLAN OF CARE
[facility]  Physical Therapy Initial Evaluation    Name: Dani Gastelum Jr.  Clinic Number: [id number]    Therapy Diagnosis:   Encounter Diagnoses   Name Primary?    Neck pain     Chronic left-sided low back pain without sciatica      Physician: Isaac Cesar PA-C    Physician Orders: PT Eval and Treat   Medical Diagnosis from Referral: DDD (degenerative disc disease), cervical  Evaluation Date: 11/6/2019  Authorization Period Expiration: 12/31/2019  Plan of Care Expiration: 12/31/2019  Visit # / Visits authorized: 1/ 20    Time In: 2:30  Time Out: 3:30  Total Billable Time: 60 minutes    Precautions: Standard    Subjective   Date of onset: Pt reported that his neck pain has increase over the past few months  History of current condition - Dani reports: Pt reports a Hx of neck and lower back problem.  HE also reports a Hx for gym exercise that is sometimes limited by his neck and lower back pain.     Medical History:   Past Medical History:   Diagnosis Date    ALLERGIC RHINITIS     Allergy     BPH (benign prostatic hypertrophy)     Hyperlipidemia     Hypertension     Thalassemia, beta     Vertigo, intermittent        Surgical History:   Dani Gastelum Jr.  has a past surgical history that includes Shoulder surgery (Right); Nasal septum surgery; Vasectomy; Knee surgery (Right); and Transurethral resection of prostate.    Medications:   Dani has a current medication list which includes the following prescription(s): aspirin, co-enzyme q-10, fish oil-omega-3 fatty acids, fluticasone propionate, multivitamin/iron/folic acid, glucosamine-chondroitin, hydrocodone-acetaminophen, latanoprost, losartan, pravastatin, pravastatin, and voltaren.    Allergies:   Review of patient's allergies indicates:  No Known Allergies     Imaging, Lumbar and Cx X-rays    Prior Therapy: yes  Social History: no stairs at home, but stairs at the gym.  Pt lives with their spouse  Occupation: Retired Fire  "fighter  Prior Level of Function: Independent in all ADL's  Current Level of Function: difficulty going to sleep    Pain:  Current 2/10, worst 6/10, best 2/10   Location: bilateral neck and L side of lower back  Description: Aching  Aggravating Factors: neck pain after working out and with turning his neck while driving  Easing Factors: OTC medication and caffeine    Pts goals: To be able to exercise and perform his daily activities pain free.    Objective     Observation: Pt was able to enter the clinic ambulating independently without an assistive device.    Posture: Pt with an"S" curve scoliosis with the L side of his pelvis elevated.  L "C:" curve in his thoracic spine with a compensatory curve in the upper thoracic and Cx spine.    Cervical Range of Motion:    Degrees Pain   Flexion 50 yes     Extension 45 yes     Right Rotation nt nt     Left Rotation nt nt     Right Side Bending 27 yes   Left Side Bending 23 yes      Shoulder Range of Motion:   Shoulder Left Right   Flexion nt nt   Abduction nt nt   ER nt nt   IR nt nt     Strength:  Cervical MMT   Flexion nt   Extension nt   Right Side Bend 4+/5   Left Side Bend 4+/5     Upper Extremity Strength  (R) UE  (L) UE    Shoulder flexion: 4/5 Shoulder flexion: 5/5   Shoulder Abduction: 3+/5 Shoulder abduction: 4+/5   Shoulder ER 3+/5 Shoulder ER 4+/5   Shoulder IR 4+/5 Shoulder IR 5/5   Elbow flexion: 5/5 Elbow flexion: 5/5   Elbow extension: 5/5 Elbow extension: 5/5   Wrist flexion: 5/5 Wrist flexion: 5/5   Wrist extension: 5/5 Wrist extension: 5/5    5/5 : 5/5   Lower Trap nt Lower Trap nt   Middle Trap nt Middle Trap nt   Rhomboids nt Rhomboids nt         Special Tests:  Distraction (+)   Compression nt   Spurlings nt   Sharp-Debbie nt   VA test nt   Lateral Flexion Alar Ligament nt   DNF test nt       Joint Mobility: Cx spine hypomobilie    Thoracic mobility: Hypomobile      Sensation: Reported intact    Flexibility: nt    TREATMENT   Treatment Time " "In: 3:00  Treatment Time Out: 3:30  Total Treatment time separate from Evaluation: 30 minutes    Dani received therapeutic exercises to develop strength, endurance and posture for 20 minutes including:  Scapular retractions  Gluteal sets  Abdominal bracing  Discussed Pt's gym workout and the fact the he is already performing seated rows and lat pull downs and how he can incorporate core strengthening in his program by engaging his abdominals and gluteals while he is performing his present exercise program.    Dani received the following manual therapy techniques: Joint mobilizations, Manual traction and Soft tissue Mobilization were applied to the: Cx and thoracic spine for 10 minutes, including:  Manual Cx traction  Thoracic spine and rib cage passive mobilization  Soft tissue mobilization thoracic and Cx musculature.      Home Exercises and Patient Education Provided    Education provided:   - yes    Written Home Exercises Provided: no.  Pt was provided verbal instruction in a home/gym exercise program  Exercises were reviewed and Dani was able to demonstrate them prior to the end of the session.  Dani demonstrated good  understanding of the education provided.     See EMR under for exercises provided Pt was provided verbal a suggestions on how he can begin to address his Neck and back Sx.    Assessment   Dani is a 64 y.o. male referred to outpatient Physical Therapy with a medical diagnosis of DDD (degenerative disc disease), cervical. Pt presents with an "S" curve thoracic and Cx spine scoliosis and c/o neck and lower back pain.    Pt prognosis is Good.   Pt will benefit from skilled outpatient Physical Therapy to address the deficits stated above and in the chart below, provide pt/family education, and to maximize pt's level of independence.     Plan of care discussed with patient: Yes  Pt's spiritual, cultural and educational needs considered and patient is agreeable to the plan of care and goals as " stated below:     Anticipated Barriers for therapy: none    Medical Necessity is demonstrated by the following  History  Co-morbidities and personal factors that may impact the plan of care Co-morbidities:   advanced age and HTN    Personal Factors:   age  coping style     low   Examination  Body Structures and Functions, activity limitations and participation restrictions that may impact the plan of care Body Regions:   neck  back  trunk    Body Systems:    gross symmetry  ROM  strength    Participation Restrictions:   No deficits    Activity limitations:   Learning and applying knowledge  no deficits    General Tasks and Commands  no deficits    Communication  no deficits    Mobility  lifting and carrying objects    Self care  no deficits    Domestic Life  no deficits    Interactions/Relationships  no deficits    Life Areas  no deficits    Community and Social Life  no deficits         low   Clinical Presentation stable and uncomplicated low   Decision Making/ Complexity Score: low     Goals:  Short Term Goals: 3 weeks   Pt will be independent in a HEP to address his functional deficits  Pt with have an improved sense of postural awareness     Long Term Goals: 8 weeks   Pt will report a decrease in his level of pain, at worst, to </= 2/10  Pt will be able to perform AROM of the Cx spine WFL and pain free  Pt is to report that he is able to manage his neck and back Sx through a home/gym exercise program.    Plan   Plan of care Certification: 11/6/2019 to 12/31/2019.    Outpatient Physical Therapy 2 times weekly for 8 weeks to include the following interventions: Cervical/Lumbar Traction, Electrical Stimulation IFC/Nigerian, Manual Therapy, Moist Heat/ Ice, Neuromuscular Re-ed, Patient Education, Self Care, Therapeutic Activites, Therapeutic Exercise and dry needling.     Duran Franklin, PT

## 2019-11-19 NOTE — PROGRESS NOTES
Physical Therapy Daily Treatment Note     Name: Dani Gastelum Jr.  Clinic Number: [id number]    Therapy Diagnosis:   Encounter Diagnosis   Name Primary?    Neck pain      Physician: Isaac Cesar PA-C    Visit Date: 11/20/2019    Physician Orders: PT Eval and Treat   Medical Diagnosis from Referral: DDD (degenerative disc disease), cervical  Evaluation Date: 11/6/2019  Authorization Period Expiration: 12/31/2019  Plan of Care Expiration: 12/31/2019  Visit # / Visits authorized: 2 / 20  PTA Visit Number: 1    Time In: 10:00am   Time Out: 10:45 am   Total Billable Time:  30 minutes  Charges: TE -2     Precautions: Standard    Subjective     Pt reports: His back pain is about the same   He was compliant with home exercise program.  Response to previous treatment:   Functional change: None     Pain: 2/10  Location: bilateral back      Objective     Dani received therapeutic exercises to develop strength, endurance, ROM, flexibility and posture for 45 minutes including:      Scapular retractions: 20 x 5 second hold   Gluteal sets: 20 x 5 second hold   Abdominal bracing: 20 x 5 second hold   Bridges: 2 x 10   Side lying Clamshells: 2 x 10  Overhead flexion with bridge: 2 x 10 Red band     Discussed Pt's gym workout and the fact the he is already performing seated rows and lat pull downs and how he can incorporate core strengthening in his program by engaging his abdominals and gluteals while he is performing his present exercise program.     Dani received the following manual therapy techniques: Joint mobilizations, Manual traction and Soft tissue Mobilization were applied to the: Cx and thoracic spine for 10 minutes, including:    Manual Cx traction  Thoracic spine and rib cage passive mobilization _ not performed   Soft tissue mobilization thoracic and Cx musculature.      Home Exercises Provided and Patient Education Provided     Education provided:       Written Home Exercises Provided: Patient instructed to  cont prior HEP.  Exercises were reviewed and Dani was able to demonstrate them prior to the end of the session.  Dani demonstrated good  understanding of the education provided.     See EMR under Patient Instructions for exercises provided prior visit.    Assessment     Patient tolerated therapy session well. Patient with decreased core strength and postural awareness and will continue to benefit from skilled physical therapy to address deficits.   Dani is progressing well towards his goals.   Pt prognosis is Good.     Pt will continue to benefit from skilled outpatient physical therapy to address the deficits listed in the problem list box on initial evaluation, provide pt/family education and to maximize pt's level of independence in the home and community environment.     Pt's spiritual, cultural and educational needs considered and pt agreeable to plan of care and goals.     Anticipated barriers to physical therapy: None     Goals:     Goals:  Short Term Goals: 3 weeks   Pt will be independent in a HEP to address his functional deficits  Pt with have an improved sense of postural awareness      Long Term Goals: 8 weeks   Pt will report a decrease in his level of pain, at worst, to </= 2/10  Pt will be able to perform AROM of the Cx spine WFL and pain free  Pt is to report that he is able to manage his neck and back Sx through a home/gym exercise program.    Plan     Cont with PT POC as tolerated     Sandee Youngblood, PTA

## 2019-11-20 ENCOUNTER — CLINICAL SUPPORT (OUTPATIENT)
Dept: REHABILITATION | Facility: HOSPITAL | Age: 64
End: 2019-11-20
Payer: COMMERCIAL

## 2019-11-20 DIAGNOSIS — M54.2 NECK PAIN: ICD-10-CM

## 2019-11-20 PROCEDURE — 97110 THERAPEUTIC EXERCISES: CPT | Mod: PO

## 2019-11-25 ENCOUNTER — CLINICAL SUPPORT (OUTPATIENT)
Dept: REHABILITATION | Facility: HOSPITAL | Age: 64
End: 2019-11-25
Payer: COMMERCIAL

## 2019-11-25 DIAGNOSIS — M54.2 NECK PAIN: ICD-10-CM

## 2019-11-25 PROCEDURE — 97110 THERAPEUTIC EXERCISES: CPT | Mod: PO

## 2019-11-25 NOTE — PROGRESS NOTES
Physical Therapy Daily Treatment Note     Name: Dani Gastelum Jr.  Clinic Number: [id number]    Therapy Diagnosis:   Encounter Diagnosis   Name Primary?    Neck pain      Physician: Isaac Cesar PA-C    Visit Date: 11/25/2019    Physician Orders: PT Eval and Treat   Medical Diagnosis from Referral: DDD (degenerative disc disease), cervical  Evaluation Date: 11/6/2019  Authorization Period Expiration: 12/31/2019  Plan of Care Expiration: 12/31/2019  Visit # / Visits authorized: 2 / 20  PTA Visit Number: 1    Time In: 11:00am   Time Out: 11:45 am   Total Billable Time:  30 minutes  Charges: TE -2     Precautions: Standard    Subjective     Pt reports: His back pain is about the same   He was compliant with home exercise program.  Response to previous treatment:   Functional change: None     Pain: 2/10  Location: bilateral back      Objective     Dani received therapeutic exercises to develop strength, endurance, ROM, flexibility and posture for 45 minutes including:    UBE:  3 mins fwd / 3 mins back  Scapular retractions: 20 x 5 second hold   Gluteal sets: 20 x 5 second hold   Abdominal bracing: 20 x 5 second hold   Bridges: 2 x 10   Side lying Clamshells: 2 x 10  Overhead flexion with bridge: 2 x 10 Red band     Discussed Pt's gym workout and the fact the he is already performing seated rows and lat pull downs and how he can incorporate core strengthening in his program by engaging his abdominals and gluteals while he is performing his present exercise program.     Dani received the following manual therapy techniques: Joint mobilizations, Manual traction and Soft tissue Mobilization were applied to the: Cx and thoracic spine for 10 minutes, including:    Manual Cx traction  Thoracic spine and rib cage passive mobilization _ not performed   Soft tissue mobilization thoracic and Cx musculature.      Home Exercises Provided and Patient Education Provided     Education provided:       Written Home Exercises  Provided: Patient instructed to cont prior HEP.  Exercises were reviewed and Dani was able to demonstrate them prior to the end of the session.  Dani demonstrated good  understanding of the education provided.     See EMR under Patient Instructions for exercises provided prior visit.    Assessment     Patient tolerated therapy session well. Patient with decreased core strength and postural awareness and will continue to benefit from skilled physical therapy to address deficits.   Dani is progressing well towards his goals.   Pt prognosis is Good.     Pt will continue to benefit from skilled outpatient physical therapy to address the deficits listed in the problem list box on initial evaluation, provide pt/family education and to maximize pt's level of independence in the home and community environment.     Pt's spiritual, cultural and educational needs considered and pt agreeable to plan of care and goals.     Anticipated barriers to physical therapy: None     Goals:  Short Term Goals: 3 weeks   Pt will be independent in a HEP to address his functional deficits  Pt with have an improved sense of postural awareness      Long Term Goals: 8 weeks   Pt will report a decrease in his level of pain, at worst, to </= 2/10  Pt will be able to perform AROM of the Cx spine WFL and pain free  Pt is to report that he is able to manage his neck and back Sx through a home/gym exercise program.    Plan     Cont with PT POC as tolerated     Sandee Youngblood, PTA

## 2019-11-26 RX ORDER — LOSARTAN POTASSIUM 50 MG/1
TABLET ORAL
Qty: 30 TABLET | Refills: 6 | Status: SHIPPED | OUTPATIENT
Start: 2019-11-26 | End: 2020-07-06

## 2019-11-27 ENCOUNTER — CLINICAL SUPPORT (OUTPATIENT)
Dept: REHABILITATION | Facility: HOSPITAL | Age: 64
End: 2019-11-27
Payer: COMMERCIAL

## 2019-11-27 DIAGNOSIS — M54.50 CHRONIC LEFT-SIDED LOW BACK PAIN WITHOUT SCIATICA: ICD-10-CM

## 2019-11-27 DIAGNOSIS — G89.29 CHRONIC LEFT-SIDED LOW BACK PAIN WITHOUT SCIATICA: ICD-10-CM

## 2019-11-27 DIAGNOSIS — M54.2 NECK PAIN: ICD-10-CM

## 2019-11-27 PROCEDURE — 97110 THERAPEUTIC EXERCISES: CPT | Mod: PO

## 2019-11-27 PROCEDURE — 97140 MANUAL THERAPY 1/> REGIONS: CPT | Mod: PO

## 2019-11-27 NOTE — PROGRESS NOTES
Physical Therapy Daily Treatment Note     Name: Dani Gastelum Jr.  Clinic Number: [id number]    Therapy Diagnosis:   Encounter Diagnoses   Name Primary?    Neck pain     Chronic left-sided low back pain without sciatica      Physician: Isaac Cesar PA-C    Visit Date: 11/27/2019    Physician Orders: PT Eval and Treat   Medical Diagnosis from Referral: DDD (degenerative disc disease), cervical  Evaluation Date: 11/6/2019  Authorization Period Expiration: 12/31/2019  Plan of Care Expiration: 12/31/2019  Visit # / Visits authorized: 3 / 20  PTA Visit Number: 1    Time In: 1:30 pm   Time Out: 2:30 pm   Total Billable Time:  30 minutes  Charges: TE - 3, MT - 1    Precautions: Standard    Subjective     Pt reports: His back pain is feeling line, but his neck is sore   He was compliant with home exercise program.  Response to previous treatment:   Functional change: None     Pain: 2/10  Location: bilateral back      Objective     Dani received therapeutic exercises to develop strength, endurance, ROM, flexibility and posture for 45 minutes including:    UBE:  3 mins fwd / 3 mins back  Bridges: 2 x 10   Side lying Clamshells: 2 x 10  Overhead flexion with bridge: 2 x 10 Red band   Cable cross B shoulder extension 10 x 2 with 7# on each  Cable cross rows 10 x 2 with 10 # on each  Cable cross Palof press with one step out 10 x 2 to R & L with 3#  Side lying U shoulder horizontal abduction 10 x 2    Not performed:  Scapular retractions: 20 x 5 second hold   Gluteal sets: 20 x 5 second hold   Abdominal bracing: 20 x 5 second hold      Dani received the following manual therapy techniques: Joint mobilizations, Manual traction and Soft tissue Mobilization were applied to the: Cx and thoracic spine for 10 minutes, including:    Manual Cx traction - np  Thoracic spine and rib cage passive mobilization    Soft tissue mobilization thoracic and Cx musculature.      Home Exercises Provided and Patient Education Provided      Education provided:       Written Home Exercises Provided: Patient instructed to cont prior HEP.  Exercises were reviewed and Dani was able to demonstrate them prior to the end of the session.  Dani demonstrated good  understanding of the education provided.     See EMR under Patient Instructions for exercises provided prior visit.    Assessment     Patient tolerated therapy session well. His L shoulder was not able to comfortably perform prone horizontal abduction.    Dani is progressing well towards his goals.   Pt prognosis is Good.     Pt will continue to benefit from skilled outpatient physical therapy to address the deficits listed in the problem list box on initial evaluation, provide pt/family education and to maximize pt's level of independence in the home and community environment.     Pt's spiritual, cultural and educational needs considered and pt agreeable to plan of care and goals.     Anticipated barriers to physical therapy: None     Goals:  Short Term Goals: 3 weeks   Pt will be independent in a HEP to address his functional deficits  Pt with have an improved sense of postural awareness      Long Term Goals: 8 weeks   Pt will report a decrease in his level of pain, at worst, to </= 2/10  Pt will be able to perform AROM of the Cx spine WFL and pain free  Pt is to report that he is able to manage his neck and back Sx through a home/gym exercise program.    Plan     Cont with PT POC as tolerated     Duran Franklin, PT

## 2019-12-02 NOTE — PROGRESS NOTES
Physical Therapy Daily Treatment Note     Name: Dani Gastelum Jr.  Clinic Number: [id number]    Therapy Diagnosis:   Encounter Diagnoses   Name Primary?    Neck pain     Chronic left-sided low back pain without sciatica      Physician: Isaac Cesar PA-C    Visit Date: 12/5/2019    Physician Orders: PT Eval and Treat   Medical Diagnosis from Referral: DDD (degenerative disc disease), cervical  Evaluation Date: 11/6/2019  Authorization Period Expiration: 12/31/2019  Plan of Care Expiration: 12/31/2019  Visit # / Visits authorized: 4 / 20  PTA Visit Number: 1    Time In: 10:07 am   Time Out: 11:00 am  Total Billable Time:  53 minutes  Charges: TE -     Precautions: Standard    Subjective     Pt reports: his pain has been about the same since starting therapy sessions.   He was compliant with home exercise program.  Response to previous treatment:   Functional change: None     Pain: 2/10  Location: bilateral back      Objective     Dani received therapeutic exercises to develop strength, endurance, ROM, flexibility and posture for 45 minutes including:    UBE:  3 mins fwd / 3 mins back  Bridges: 2 x 10   Side lying Clamshells: 2 x 10  Overhead flexion with bridge: 2 x 10 Red band   Cable cross B shoulder extension 10 x 2 with 7# on each  Cable cross rows 10 x 2 with 10 # on each  Cable cross Palof press with one step out 10 x 2 to R & L with 3#  Side lying U shoulder horizontal abduction 10 x 2    Not performed:  Scapular retractions: 20 x 5 second hold   Gluteal sets: 20 x 5 second hold   Abdominal bracing: 20 x 5 second hold      Dani received the following manual therapy techniques: Joint mobilizations, Manual traction and Soft tissue Mobilization were applied to the: Cx and thoracic spine for 8 minutes, including:    Manual Cx traction - NP  Thoracic spine and rib cage passive mobilization   - NP  Soft tissue mobilization thoracic and Cx musculature.  Dry needling performed by Suraj Franklin PT     Home  Exercises Provided and Patient Education Provided     Education provided:       Written Home Exercises Provided: Patient instructed to cont prior HEP.  Exercises were reviewed and Dani was able to demonstrate them prior to the end of the session.  Dani demonstrated good  understanding of the education provided.     See EMR under Patient Instructions for exercises provided prior visit.    Assessment     Patient tolerated therapy session well. No increase in pain or adverse symptoms. Will continue to benefit from skilled physical therapy     Dani is progressing well towards his goals.   Pt prognosis is Good.     Pt will continue to benefit from skilled outpatient physical therapy to address the deficits listed in the problem list box on initial evaluation, provide pt/family education and to maximize pt's level of independence in the home and community environment.     Pt's spiritual, cultural and educational needs considered and pt agreeable to plan of care and goals.     Anticipated barriers to physical therapy: None     Goals:  Short Term Goals: 3 weeks   Pt will be independent in a HEP to address his functional deficits  Pt with have an improved sense of postural awareness      Long Term Goals: 8 weeks   Pt will report a decrease in his level of pain, at worst, to </= 2/10  Pt will be able to perform AROM of the Cx spine WFL and pain free  Pt is to report that he is able to manage his neck and back Sx through a home/gym exercise program.    Plan     Cont with PT POC as tolerated     Sandee Youngblood, PTA

## 2019-12-03 ENCOUNTER — CLINICAL SUPPORT (OUTPATIENT)
Dept: REHABILITATION | Facility: HOSPITAL | Age: 64
End: 2019-12-03
Payer: COMMERCIAL

## 2019-12-03 DIAGNOSIS — G89.29 CHRONIC LEFT-SIDED LOW BACK PAIN WITHOUT SCIATICA: ICD-10-CM

## 2019-12-03 DIAGNOSIS — M54.50 CHRONIC LEFT-SIDED LOW BACK PAIN WITHOUT SCIATICA: ICD-10-CM

## 2019-12-03 DIAGNOSIS — M54.2 NECK PAIN: ICD-10-CM

## 2019-12-03 PROCEDURE — 97530 THERAPEUTIC ACTIVITIES: CPT | Mod: PO

## 2019-12-03 PROCEDURE — 97110 THERAPEUTIC EXERCISES: CPT | Mod: PO

## 2019-12-03 NOTE — PROGRESS NOTES
Physical Therapy Daily Treatment Note     Name: Dani Gastelum Jr.  Clinic Number: [id number]    Therapy Diagnosis:   Encounter Diagnoses   Name Primary?    Neck pain     Chronic left-sided low back pain without sciatica      Physician: Isaac Cesar PA-C    Visit Date: 12/3/2019       Physician Orders: PT Eval and Treat   Medical Diagnosis from Referral: DDD (degenerative disc disease), cervical  Evaluation Date: 11/6/2019  Authorization Period Expiration: 12/31/2019  Plan of Care Expiration: 12/31/2019  Visit # / Visits authorized: 4 / 20  PTA Visit Number: 1    Time In: 10:00 am   Time Out: 11:00 am   Total Billable Time:  30 minutes  Charges: TE - 1, TA - 1    Precautions: Standard    Subjective     Pt reports: His back pain is feeling line, but his neck is sore   He was compliant with home exercise program.  Response to previous treatment:   Functional change: None     Pain: 2/10  Location: bilateral back      Objective     Dani received therapeutic exercises to develop strength, endurance, ROM, flexibility and posture for 45 minutes including:    UBE:  3 mins fwd / 3 mins back  Bridges: 3 x 10  Bridges on therapy ball 10 x 2 - Pt c/o knee discomfort with this activity   Side lying Clamshells: 3 x 10 with orange TB  Overhead flexion 2 x 10 orange band with B knee and hip flexion with heels on red ball  Prone over therapy ball  U hip extension 10 x 2 on R & L  U shoulder flexion 10 x 2 on R & L  Alt shoulder flex/hip ext 10 x 2  Side lying U shoulder horizontal abduction 10 x 2    Not performed:  Scapular retractions: 20 x 5 second hold   Cable cross B shoulder extension 10 x 2 with 7# on each  Cable cross rows 10 x 2 with 10 # on each  Cable cross Palof press with one step out 10 x 2 to R & L with 3#     Dani did NOT receive the following manual therapy techniques: Joint mobilizations, Manual traction and Soft tissue Mobilization were applied to the: Cx and thoracic spine for 10 minutes,  including:    Manual Cx traction - np  Thoracic spine and rib cage passive mobilization - np  Soft tissue mobilization thoracic and Cx musculature. - np      Home Exercises Provided and Patient Education Provided     Education provided:       Written Home Exercises Provided: Patient instructed to cont prior HEP.  Exercises were reviewed and Dani was able to demonstrate them prior to the end of the session.  Dani demonstrated good  understanding of the education provided.     See EMR under Patient Instructions for exercises provided prior visit.    Assessment     Patient tolerated therapy session with c/o knee pain secondary to a fall in the gym.     Dani is progressing well towards his goals.   Pt prognosis is Good.     Pt will continue to benefit from skilled outpatient physical therapy to address the deficits listed in the problem list box on initial evaluation, provide pt/family education and to maximize pt's level of independence in the home and community environment.     Pt's spiritual, cultural and educational needs considered and pt agreeable to plan of care and goals.     Anticipated barriers to physical therapy: None     Goals:  Short Term Goals: 3 weeks   Pt will be independent in a HEP to address his functional deficits  Pt with have an improved sense of postural awareness      Long Term Goals: 8 weeks   Pt will report a decrease in his level of pain, at worst, to </= 2/10  Pt will be able to perform AROM of the Cx spine WFL and pain free  Pt is to report that he is able to manage his neck and back Sx through a home/gym exercise program.    Plan     Cont with PT POC as tolerated     Duran Franklin, PT

## 2019-12-05 ENCOUNTER — CLINICAL SUPPORT (OUTPATIENT)
Dept: REHABILITATION | Facility: HOSPITAL | Age: 64
End: 2019-12-05
Payer: COMMERCIAL

## 2019-12-05 DIAGNOSIS — M54.2 NECK PAIN: ICD-10-CM

## 2019-12-05 DIAGNOSIS — G89.29 CHRONIC LEFT-SIDED LOW BACK PAIN WITHOUT SCIATICA: ICD-10-CM

## 2019-12-05 DIAGNOSIS — M54.50 CHRONIC LEFT-SIDED LOW BACK PAIN WITHOUT SCIATICA: ICD-10-CM

## 2019-12-05 PROCEDURE — 97110 THERAPEUTIC EXERCISES: CPT | Mod: PO

## 2019-12-05 PROCEDURE — 97140 MANUAL THERAPY 1/> REGIONS: CPT | Mod: PO

## 2019-12-09 ENCOUNTER — PATIENT OUTREACH (OUTPATIENT)
Dept: ADMINISTRATIVE | Facility: OTHER | Age: 64
End: 2019-12-09

## 2019-12-10 ENCOUNTER — CLINICAL SUPPORT (OUTPATIENT)
Dept: REHABILITATION | Facility: HOSPITAL | Age: 64
End: 2019-12-10
Payer: COMMERCIAL

## 2019-12-10 DIAGNOSIS — G89.29 CHRONIC LEFT-SIDED LOW BACK PAIN WITHOUT SCIATICA: ICD-10-CM

## 2019-12-10 DIAGNOSIS — M54.50 CHRONIC LEFT-SIDED LOW BACK PAIN WITHOUT SCIATICA: ICD-10-CM

## 2019-12-10 DIAGNOSIS — M54.2 NECK PAIN: ICD-10-CM

## 2019-12-10 PROCEDURE — 97140 MANUAL THERAPY 1/> REGIONS: CPT | Mod: PO

## 2019-12-10 PROCEDURE — 97110 THERAPEUTIC EXERCISES: CPT | Mod: PO

## 2019-12-10 NOTE — PROGRESS NOTES
Physical Therapy Daily Treatment Note     Name: Dani Gastelum Jr.  Clinic Number: [id number]    Therapy Diagnosis:   Encounter Diagnoses   Name Primary?    Neck pain     Chronic left-sided low back pain without sciatica      Physician: Isaac Cesar PA-C    Visit Date: 12/10/2019    Physician Orders: PT Eval and Treat   Medical Diagnosis from Referral: DDD (degenerative disc disease), cervical  Evaluation Date: 11/6/2019  Authorization Period Expiration: 12/31/2019  Plan of Care Expiration: 12/31/2019  Visit # / Visits authorized: 7 / 20  PTA Visit Number: 1    Time In: 10:00 am   Time Out: 11:00 am  Total Billable Time:  53 minutes  Charges: TE - 2, MT - 1    Precautions: Standard    Subjective     Pt reports: that he sore at the base of his neck most of the time.  He was compliant with home exercise program.  Response to previous treatment:   Functional change: None     Pain: 3/10  Location: neck    Objective     Dani received therapeutic exercises to develop strength, endurance, ROM, flexibility and posture for 30 minutes including:    UBE:  3 mins fwd / 3 mins back  Cable cross B shoulder extension 10 x 2 with 7# on each - seated with head against the bench  Cable cross rows 10 x 2 with 10 # on eac - seated with head against the bench  h  Cable cross Palof press with one step out 10 x 2 to R & L with 3#  Side lying U shoulder horizontal abduction 10 x 2    Not performed:  Bridges: 2 x 10   Side lying Clamshells: 2 x 10  Overhead flexion with bridge: 2 x 10 Red band   Scapular retractions: 20 x 5 second hold   Gluteal sets: 20 x 5 second hold   Abdominal bracing: 20 x 5 second hold      Dani received the following manual therapy techniques: Joint mobilizations, Manual traction and Soft tissue Mobilization were applied to the: Cx and thoracic spine for 15 minutes, including:    Thoracic spine and rib cage passive mobilization   Soft tissue mobilization thoracic and Cx musculature.  Dry needling to  B C4 -  T2  B spinal accessory points     Not performed  Manual Cx traction     Home Exercises Provided and Patient Education Provided     Education provided:       Written Home Exercises Provided: Patient instructed to cont prior HEP.  Exercises were reviewed and Dani was able to demonstrate them prior to the end of the session.  Dani demonstrated good  understanding of the education provided.     See EMR under Patient Instructions for exercises provided prior visit.    Assessment     Patient pt with a forward head and rounded shoulder posture that may be contributing to his lower Cx spine discomfort.     Dani is progressing towards his goals.   Pt prognosis is Good.     Pt will continue to benefit from skilled outpatient physical therapy to address the deficits listed in the problem list box on initial evaluation, provide pt/family education and to maximize pt's level of independence in the home and community environment.     Pt's spiritual, cultural and educational needs considered and pt agreeable to plan of care and goals.     Anticipated barriers to physical therapy: None     Goals:  Short Term Goals: 3 weeks   Pt will be independent in a HEP to address his functional deficits - met 12/10/2019  Pt with have an improved sense of postural awareness      Long Term Goals: 8 weeks   Pt will report a decrease in his level of pain, at worst, to </= 2/10  Pt will be able to perform AROM of the Cx spine WFL and pain free  Pt is to report that he is able to manage his neck and back Sx through a home/gym exercise program.    Plan     Cont with PT POC as tolerated     Duran Franklin, PT

## 2019-12-11 ENCOUNTER — HOSPITAL ENCOUNTER (OUTPATIENT)
Dept: RADIOLOGY | Facility: HOSPITAL | Age: 64
Discharge: HOME | End: 2019-12-11
Attending: PHYSICIAN ASSISTANT
Payer: COMMERCIAL

## 2019-12-11 DIAGNOSIS — M50.30 DDD (DEGENERATIVE DISC DISEASE), CERVICAL: ICD-10-CM

## 2019-12-11 PROCEDURE — 72141 MRI CERVICAL SPINE WITHOUT CONTRAST: ICD-10-PCS | Mod: 26,,, | Performed by: RADIOLOGY

## 2019-12-11 PROCEDURE — 72141 MRI NECK SPINE W/O DYE: CPT | Mod: TC

## 2019-12-11 PROCEDURE — 72141 MRI NECK SPINE W/O DYE: CPT | Mod: 26,,, | Performed by: RADIOLOGY

## 2019-12-12 ENCOUNTER — CLINICAL SUPPORT (OUTPATIENT)
Dept: REHABILITATION | Facility: HOSPITAL | Age: 64
End: 2019-12-12
Payer: COMMERCIAL

## 2019-12-12 DIAGNOSIS — M54.2 NECK PAIN: ICD-10-CM

## 2019-12-12 PROCEDURE — 97110 THERAPEUTIC EXERCISES: CPT | Mod: PO

## 2019-12-13 NOTE — PROGRESS NOTES
"  Physical Therapy Daily Treatment Note     Name: Dani Gastelum Jr.  Clinic Number: [id number]    Therapy Diagnosis:   Encounter Diagnosis   Name Primary?    Neck pain      Physician: Isaac Cesar PA-C    Visit Date: 12/12/2019    Physician Orders: PT Eval and Treat   Medical Diagnosis from Referral: DDD (degenerative disc disease), cervical  Evaluation Date: 11/6/2019  Authorization Period Expiration: 12/31/2019  Plan of Care Expiration: 12/31/2019  Visit # / Visits authorized: 8 / 20  PTA Visit Number: 1    Time In: 10:00 am   Time Out: 11:00 am  Total Billable Time:  30 minutes  Charges: TE - 2    Precautions: Standard    Subjective     Pt reports: that he sore at the base of his neck most of the time.  He was compliant with home exercise program.  Response to previous treatment:   Functional change: None     Pain: 3/10  Location: neck    Objective     Dani received therapeutic exercises to develop strength, endurance, ROM, flexibility and posture for 30 minutes including:    UBE:  3 mins fwd / 3 mins back  Cable cross B shoulder extension 10 x 2 with 7# on each - seated with head against the bench  Cable cross rows 10 x 2 with 10 # on eac - seated with head against the bench  Cable cross Palof press with one step out 10 x 2 to R & L with 3#  Side lying U shoulder horizontal abduction 10 x 2  Cable cross seated lat pull downs 10 x 3 with 17# on R & L  Supine lying on a 6" x 36" foam roller x 5 min  Alt shoulder flexion to 90 degrees 10 x 3 lying supine on a 6"x36" foam roller.    Not performed:  Bridges: 2 x 10   Side lying Clamshells: 2 x 10  Overhead flexion with bridge: 2 x 10 Red band   Scapular retractions: 20 x 5 second hold   Gluteal sets: 20 x 5 second hold   Abdominal bracing: 20 x 5 second hold      Dani did not receive the following manual therapy techniques: Joint mobilizations, Manual traction and Soft tissue Mobilization were applied to the: Cx and thoracic spine for 15 minutes, " including:    Thoracic spine and rib cage passive mobilization   Soft tissue mobilization thoracic and Cx musculature.  Dry needling to  B C4 - T2  B spinal accessory points     Not performed  Manual Cx traction     Home Exercises Provided and Patient Education Provided     Education provided:       Written Home Exercises Provided: Patient instructed to cont prior HEP.  Exercises were reviewed and Dani was able to demonstrate them prior to the end of the session.  Dani demonstrated good  understanding of the education provided.     See EMR under Patient Instructions for exercises provided prior visit.    Assessment     Patient tolerated all Tx activities well today.    Dani is progressing towards his goals.   Pt prognosis is Good.     Pt will continue to benefit from skilled outpatient physical therapy to address the deficits listed in the problem list box on initial evaluation, provide pt/family education and to maximize pt's level of independence in the home and community environment.     Pt's spiritual, cultural and educational needs considered and pt agreeable to plan of care and goals.     Anticipated barriers to physical therapy: None     Goals:  Short Term Goals: 3 weeks   Pt will be independent in a HEP to address his functional deficits - met 12/10/2019  Pt with have an improved sense of postural awareness      Long Term Goals: 8 weeks   Pt will report a decrease in his level of pain, at worst, to </= 2/10  Pt will be able to perform AROM of the Cx spine WFL and pain free  Pt is to report that he is able to manage his neck and back Sx through a home/gym exercise program.    Plan     Cont with PT POC as tolerated     Duran Franklin, PT

## 2019-12-17 ENCOUNTER — CLINICAL SUPPORT (OUTPATIENT)
Dept: REHABILITATION | Facility: HOSPITAL | Age: 64
End: 2019-12-17
Payer: COMMERCIAL

## 2019-12-17 DIAGNOSIS — M54.2 NECK PAIN: ICD-10-CM

## 2019-12-17 PROCEDURE — 97140 MANUAL THERAPY 1/> REGIONS: CPT | Mod: PO

## 2019-12-17 PROCEDURE — 97110 THERAPEUTIC EXERCISES: CPT | Mod: PO

## 2019-12-17 NOTE — PROGRESS NOTES
"  Physical Therapy Daily Treatment Note     Name: Dani Gastelum Jr.  Clinic Number: [id number]    Therapy Diagnosis:   Encounter Diagnosis   Name Primary?    Neck pain      Physician: Isaac Cesar PA-C    Visit Date: 12/17/2019    Physician Orders: PT Eval and Treat   Medical Diagnosis from Referral: DDD (degenerative disc disease), cervical  Evaluation Date: 11/6/2019  Authorization Period Expiration: 12/31/2019  Plan of Care Expiration: 12/31/2019  Visit # / Visits authorized: 8 / 20  PTA Visit Number: 1    Time In: 10:00 am   Time Out: 11:00 am  Total Billable Time:  60  minutes  Charges: TE - 3 MT -1     Precautions: Standard    Subjective     Pt reports: he is feeling about the same.   He was compliant with home exercise program.  Response to previous treatment:   Functional change: None     Pain: 3- 4 /10  Location: neck    Objective     Dani received therapeutic exercises to develop strength, endurance, ROM, flexibility and posture for 45 minutes including:    UBE:  3 mins fwd / 3 mins back  Cable cross B shoulder extension 10 x 2 with 7# on each - seated with head against the bench  Cable cross rows 10 x 2 with 10 # on eac - seated with head against the bench  Cable cross Palof press with one step out 10 x 2 to R & L with #3  Side lying U shoulder horizontal abduction 10 x 2  Cable cross seated lat pull downs 10 x 3 with 17# on R & L  Supine lying on a 6" x 36" foam roller x 5 min  Alt shoulder flexion to 90 degrees 10 x 3 lying supine on a 6"x36" foam roller.    Not performed:  Bridges: 2 x 10   Side lying Clamshells: 2 x 10  Overhead flexion with bridge: 2 x 10 Red band   Scapular retractions: 20 x 5 second hold   Gluteal sets: 20 x 5 second hold   Abdominal bracing: 20 x 5 second hold      Dani did not receive the following manual therapy techniques: Joint mobilizations, Manual traction and Soft tissue Mobilization were applied to the: Cx and thoracic spine for 15 minutes, including:    Thoracic " spine and rib cage passive mobilization - Not performed   Soft tissue mobilization thoracic and Cx musculature.  Dry needling to  B C4 - T2  B spinal accessory points     Not performed  Manual Cx traction     Home Exercises Provided and Patient Education Provided     Education provided:       Written Home Exercises Provided: Patient instructed to cont prior HEP.  Exercises were reviewed and Dani was able to demonstrate them prior to the end of the session.  Dani demonstrated good  understanding of the education provided.     See EMR under Patient Instructions for exercises provided prior visit.    Assessment     Patient tolerated therapy session well, no increase in pain or adverse symptoms. Patient will continue to benefit from skilled physical therapy to address deficits and decreased pain.     Dani is progressing towards his goals.   Pt prognosis is Good.     Pt will continue to benefit from skilled outpatient physical therapy to address the deficits listed in the problem list box on initial evaluation, provide pt/family education and to maximize pt's level of independence in the home and community environment.     Pt's spiritual, cultural and educational needs considered and pt agreeable to plan of care and goals.     Anticipated barriers to physical therapy: None     Goals:  Short Term Goals: 3 weeks   Pt will be independent in a HEP to address his functional deficits - met 12/10/2019  Pt with have an improved sense of postural awareness      Long Term Goals: 8 weeks   Pt will report a decrease in his level of pain, at worst, to </= 2/10  Pt will be able to perform AROM of the Cx spine WFL and pain free  Pt is to report that he is able to manage his neck and back Sx through a home/gym exercise program.    Plan     Cont with PT POC as tolerated     Sandee Youngblood, PTA

## 2019-12-19 ENCOUNTER — CLINICAL SUPPORT (OUTPATIENT)
Dept: REHABILITATION | Facility: HOSPITAL | Age: 64
End: 2019-12-19
Payer: COMMERCIAL

## 2019-12-19 DIAGNOSIS — G89.29 CHRONIC LEFT-SIDED LOW BACK PAIN WITHOUT SCIATICA: ICD-10-CM

## 2019-12-19 DIAGNOSIS — M54.2 NECK PAIN: ICD-10-CM

## 2019-12-19 DIAGNOSIS — M54.50 CHRONIC LEFT-SIDED LOW BACK PAIN WITHOUT SCIATICA: ICD-10-CM

## 2019-12-19 PROCEDURE — 97110 THERAPEUTIC EXERCISES: CPT | Mod: PO

## 2019-12-19 PROCEDURE — 97140 MANUAL THERAPY 1/> REGIONS: CPT | Mod: PO

## 2019-12-19 NOTE — PROGRESS NOTES
"  Physical Therapy Daily Treatment Note     Name: Dani Gastelum Jr.  Clinic Number: [id number]    Therapy Diagnosis:   Encounter Diagnoses   Name Primary?    Neck pain     Chronic left-sided low back pain without sciatica      Physician: Isaac Cesar PA-C    Visit Date: 12/19/2019    Physician Orders: PT Eval and Treat   Medical Diagnosis from Referral: DDD (degenerative disc disease), cervical  Evaluation Date: 11/6/2019  Authorization Period Expiration: 12/31/2019  Plan of Care Expiration: 12/31/2019  Visit # / Visits authorized: 9 / 20  PTA Visit Number: 1    Time In: 10:00 am   Time Out: 11:00 am  Total Billable Time:  60  minutes  Charges: TE - 3 MT -1     Precautions: Standard    Subjective     Pt reports: no significant change in his discomfort.  He was compliant with home exercise program.  Response to previous treatment:   Functional change: None     Pain: 3- 4 /10  Location: neck    Objective   Cervical Range of Motion:     Degrees Pain   Flexion 55 Tight at end range      Extension 40 yes      Right Rotation nt nt      Left Rotation nt nt      Right Side Bending 27 yes   Left Side Bending 23 yes        Dani received therapeutic exercises to develop strength, endurance, ROM, flexibility and posture for 45 minutes including:    UBE:  3 mins fwd / 3 mins back in standing  Cable cross B shoulder extension 10 x 2 with 7# on each - seated with head against the bench  Cable cross rows 10 x 2 with 10 # on eac - seated with head against the bench  Side lying U shoulder horizontal abduction 10 x 2  Cable cross seated lat pull downs 10 x 3 with 17# on R & L  Supine lying on a 6" x 36" foam roller x 5 min  Alt shoulder flexion to 90 degrees 10 x 3 lying supine on a 6"x36" foam roller.    Not performed:  Cable cross Palof press with one step out 10 x 2 to R & L with #3  Bridges: 2 x 10   Side lying Clamshells: 2 x 10  Overhead flexion with bridge: 2 x 10 Red band   Scapular retractions: 20 x 5 second hold " "  Gluteal sets: 20 x 5 second hold   Abdominal bracing: 20 x 5 second hold      Dani received the following manual therapy techniques: Joint mobilizations, Manual traction and Soft tissue Mobilization were applied to the: Cx and thoracic spine for 15 minutes, including:  Thoracic spine and rib cage passive mobilization   Soft tissue mobilization thoracic and Cx musculature.  Dry needling to  B C4 - T2  B spinal accessory points   Manual Cx traction     Home Exercises Provided and Patient Education Provided     Education provided:       Written Home Exercises Provided: Patient instructed to cont prior HEP.  Exercises were reviewed and Dani was able to demonstrate them prior to the end of the session.  Dani demonstrated good  understanding of the education provided.     See EMR under Patient Instructions for exercises provided prior visit.    Assessment     Patient tolerated therapy session well, no increase in pain or adverse symptoms with exercise or manual therapy.  Pt reported a decrease in "tightness" in his neck and upper traps following dry needling.      Dani is progressing towards his goals.   Pt prognosis is Good.     Pt will continue to benefit from skilled outpatient physical therapy to address the deficits listed in the problem list box on initial evaluation, provide pt/family education and to maximize pt's level of independence in the home and community environment.     Pt's spiritual, cultural and educational needs considered and pt agreeable to plan of care and goals.     Anticipated barriers to physical therapy: None     Goals:  Short Term Goals: 3 weeks   Pt will be independent in a HEP to address his functional deficits - met 12/10/2019  Pt with have an improved sense of postural awareness      Long Term Goals: 8 weeks   Pt will report a decrease in his level of pain, at worst, to </= 2/10  Pt will be able to perform AROM of the Cx spine WFL and pain free  Pt is to report that he is able to " manage his neck and back Sx through a home/gym exercise program.    Plan     Cont with PT POC as tolerated     Duran Franklin, PT

## 2019-12-27 ENCOUNTER — TELEPHONE (OUTPATIENT)
Dept: ORTHOPEDICS | Facility: CLINIC | Age: 64
End: 2019-12-27

## 2019-12-27 ENCOUNTER — TELEPHONE (OUTPATIENT)
Dept: INTERNAL MEDICINE | Facility: CLINIC | Age: 64
End: 2019-12-27

## 2019-12-27 DIAGNOSIS — E04.2 MULTIPLE THYROID NODULES: Primary | ICD-10-CM

## 2019-12-27 NOTE — TELEPHONE ENCOUNTER
Cervical MRI results from 12/11/2019 were discussed with the patient by phone today.  Follow-up evaluation with neuro surgery is recommended and has been scheduled for next week.    Incidental note was made of thyroid nodules.  A thyroid ultrasound will be scheduled for further evaluation.

## 2019-12-27 NOTE — TELEPHONE ENCOUNTER
----- Message from Tavo Thibodeaux sent at 12/27/2019  2:47 PM CST -----  Contact: Patient [phone]  Patient would like to get test results.  Name of test (lab, mammo, etc.):  MRI  Date of test:  12/11/19  Ordering provider: PCP  Where was the test performed:  [facility]  Comments:      Please call an advise  Thank you

## 2019-12-27 NOTE — TELEPHONE ENCOUNTER
----- Message from Cedrick Huang sent at 12/27/2019  3:30 PM CST -----  Contact: [phone] / self  Patient would like his test results for recent MRI taken. Please Advise.

## 2019-12-27 NOTE — TELEPHONE ENCOUNTER
----- Message from Tavo Thibodeaux sent at 12/27/2019  2:47 PM CST -----  Contact: Patient [phone]  Patient would like to get test results.  Name of test (lab, mammo, etc.):  MRI  Date of test:  12/11/19  Ordering provider: PCP  Where was the test performed:  [facility]  Comments:      Please call an advise  Thank you

## 2019-12-31 ENCOUNTER — PATIENT OUTREACH (OUTPATIENT)
Dept: ADMINISTRATIVE | Facility: OTHER | Age: 64
End: 2019-12-31

## 2019-12-31 ENCOUNTER — TELEPHONE (OUTPATIENT)
Dept: NEUROSURGERY | Facility: CLINIC | Age: 64
End: 2019-12-31

## 2019-12-31 NOTE — TELEPHONE ENCOUNTER
----- Message from Isaac Cesar PA-C sent at 12/31/2019  3:36 PM CST -----  Can you tell me when this patient is scheduled for surgery?  She saw Dr. Will in October and was recommended MIS L2-3 laminectomy.  Her pain will only improved with surgery due to her spinal stenosis.

## 2019-12-31 NOTE — TELEPHONE ENCOUNTER
This patient only saw you in October and you recommended PT and MRI he is following up with you 1/3/20 for MRI results.

## 2020-01-03 ENCOUNTER — OFFICE VISIT (OUTPATIENT)
Dept: NEUROSURGERY | Facility: CLINIC | Age: 65
End: 2020-01-03
Payer: COMMERCIAL

## 2020-01-03 VITALS
TEMPERATURE: 97 F | DIASTOLIC BLOOD PRESSURE: 83 MMHG | WEIGHT: 215 LBS | HEIGHT: 71 IN | HEART RATE: 69 BPM | BODY MASS INDEX: 30.1 KG/M2 | SYSTOLIC BLOOD PRESSURE: 144 MMHG

## 2020-01-03 DIAGNOSIS — M54.2 NECK PAIN: ICD-10-CM

## 2020-01-03 DIAGNOSIS — M47.22 CERVICAL SPONDYLOSIS WITH RADICULOPATHY: Primary | ICD-10-CM

## 2020-01-03 DIAGNOSIS — M50.30 DEGENERATIVE DISC DISEASE, CERVICAL: ICD-10-CM

## 2020-01-03 PROCEDURE — 1101F PT FALLS ASSESS-DOCD LE1/YR: CPT | Mod: CPTII,S$GLB,, | Performed by: PHYSICIAN ASSISTANT

## 2020-01-03 PROCEDURE — 1101F PR PT FALLS ASSESS DOC 0-1 FALLS W/OUT INJ PAST YR: ICD-10-PCS | Mod: CPTII,S$GLB,, | Performed by: PHYSICIAN ASSISTANT

## 2020-01-03 PROCEDURE — 99215 PR OFFICE/OUTPT VISIT, EST, LEVL V, 40-54 MIN: ICD-10-PCS | Mod: S$GLB,,, | Performed by: PHYSICIAN ASSISTANT

## 2020-01-03 PROCEDURE — 99215 OFFICE O/P EST HI 40 MIN: CPT | Mod: S$GLB,,, | Performed by: PHYSICIAN ASSISTANT

## 2020-01-03 PROCEDURE — 3079F PR MOST RECENT DIASTOLIC BLOOD PRESSURE 80-89 MM HG: ICD-10-PCS | Mod: CPTII,S$GLB,, | Performed by: PHYSICIAN ASSISTANT

## 2020-01-03 PROCEDURE — 1125F AMNT PAIN NOTED PAIN PRSNT: CPT | Mod: S$GLB,,, | Performed by: PHYSICIAN ASSISTANT

## 2020-01-03 PROCEDURE — 99999 PR PBB SHADOW E&M-EST. PATIENT-LVL IV: ICD-10-PCS | Mod: PBBFAC,,, | Performed by: PHYSICIAN ASSISTANT

## 2020-01-03 PROCEDURE — 3079F DIAST BP 80-89 MM HG: CPT | Mod: CPTII,S$GLB,, | Performed by: PHYSICIAN ASSISTANT

## 2020-01-03 PROCEDURE — 3077F SYST BP >= 140 MM HG: CPT | Mod: CPTII,S$GLB,, | Performed by: PHYSICIAN ASSISTANT

## 2020-01-03 PROCEDURE — 99999 PR PBB SHADOW E&M-EST. PATIENT-LVL IV: CPT | Mod: PBBFAC,,, | Performed by: PHYSICIAN ASSISTANT

## 2020-01-03 PROCEDURE — 1125F PR PAIN SEVERITY QUANTIFIED, PAIN PRESENT: ICD-10-PCS | Mod: S$GLB,,, | Performed by: PHYSICIAN ASSISTANT

## 2020-01-03 PROCEDURE — 3077F PR MOST RECENT SYSTOLIC BLOOD PRESSURE >= 140 MM HG: ICD-10-PCS | Mod: CPTII,S$GLB,, | Performed by: PHYSICIAN ASSISTANT

## 2020-01-03 PROCEDURE — 1159F PR MEDICATION LIST DOCUMENTED IN MEDICAL RECORD: ICD-10-PCS | Mod: S$GLB,,, | Performed by: PHYSICIAN ASSISTANT

## 2020-01-03 PROCEDURE — 1159F MED LIST DOCD IN RCRD: CPT | Mod: S$GLB,,, | Performed by: PHYSICIAN ASSISTANT

## 2020-01-03 NOTE — PROGRESS NOTES
Established Patient    SUBJECTIVE:     Note dictated with voice recognition software, please excuse any grammatical errors.    History of Present Illness:  Dani Gastelum Jr. is a 64 y.o. male who presents for neurosurgical re-evaluation.  Patient was last seen in clinic on 10/16/2019 (see below).  Patient is here with his wife today.  He continues to complain constant posterior aching neck pain with intermittent focal numbness/tingling and bilateral 4/5th fingers.  Neck pain worsens with laying down, bending his neck, or any neck movement.  He underwent cervical physical therapy and dry kneeling with no significant relief.  Aleve helps with symptoms intermittently.  Denies any radicular arm pain, arm weakness, loss of finger dexterity, bladder/bowel incontinence, or gait instability.        Interval History  10/16/19  Dani Gastelum Jr. iswho presents for neurosurgical evaluation, self-referral.  Patient has long history of posterior neck pain for over 5 years and has worsened in the last year.  His Gram is a constant aching and rated as 4-5/10.  Pain worsens when lying down flat or turning his head.  Intermittently, he has numbness and tingling and both 4th/5th fingers; symptoms resolved spontaneously.  He takes Aleve when pain is severe and is very active (goes to the gym 3 days a week).  Denies any radicular arm pain/weakness, hand  weakness, loss of finger dexterity, bladder/bowel incontinence, or gait instability.     He also has in right low back/SI joint pain and right groin pain.  This occurs 4-5 times a month for about 5-6 years now.  Pain only occurs when he transition from sitting to standing position.  Denies any leg weakness, bladder/bowel incontinence, radicular leg pain, or saddle anesthesia.               Neck Disability  Neck Disability-Pain Score: 3  Neck Disability-Pain Intensity: The pain is moderate at the moment  Neck Disability-Personal Care: I can look after myself normally without causing  extra pain  Neck Disability-Lifting: I can lift heavy weights but it gives extra pain  Neck Disability-Reading: I can read as much as I want to, with moderate pain in my neck  Neck Disability-Headaches: I have moderate headaches that come infrequently  Neck Disability-Concentration: I can concentrate fully, when I want to, with no difficulty  Neck Disability-work: I can do as much work as I want to  Neck Disability-Driving: I can drive my car as long as I want with slight pain in my neck  Neck Disability-Sleeping: My sleep is slightly disturbed (less than 1 hr sleeplessness)  Neck Disability-Recreation: I am able to engage in most but not all of my usual recreation activiites because of pain in my neck        Review of patient's allergies indicates:  No Known Allergies    Current Outpatient Medications   Medication Sig Dispense Refill    aspirin 81 MG Chew Take 81 mg by mouth once daily.      co-enzyme Q-10 30 mg capsule Take 30 mg by mouth once daily.      fish oil-omega-3 fatty acids 300-1,000 mg capsule Take 2 g by mouth once daily.      FOLIC ACID/MV,FE,OTHER MIN (CENTRUM ORAL) Take by mouth once daily.       glucosamine-chondroitin 500-400 mg tablet Take 1 tablet by mouth 3 (three) times daily. Pt taking one pill once a day.      hydrocodone-acetaminophen 7.5-325mg (NORCO) 7.5-325 mg per tablet TK ONE T PO Q 4 TO 6 H PRF PAIN  0    latanoprost 0.005 % ophthalmic solution INT 1 GTT IN OU QD HS  3    losartan (COZAAR) 50 MG tablet TAKE 1 TABLET BY MOUTH ONCE DAILY 30 tablet 6    pravastatin (PRAVACHOL) 40 MG tablet TAKE 1 TABLET BY MOUTH EVERY DAY 30 tablet 11    VOLTAREN 1 % Gel       fluticasone (FLONASE) 50 mcg/actuation nasal spray SPRAY TWICE IN NOSTRIL(S) DAILY AS NEEDED FOR ALLERGIC RHINITIS (Patient not taking: Reported on 1/3/2020) 1 Bottle 6    pravastatin (PRAVACHOL) 40 MG tablet TAKE 1 TABLET BY MOUTH EVERY DAY (Patient not taking: Reported on 1/3/2020) 30 tablet 6     No current  facility-administered medications for this visit.        Past Medical History:   Diagnosis Date    ALLERGIC RHINITIS     Allergy     BPH (benign prostatic hypertrophy)     Hyperlipidemia     Hypertension     Thalassemia, beta     Vertigo, intermittent      Past Surgical History:   Procedure Laterality Date    KNEE SURGERY Right     NASAL SEPTUM SURGERY      SHOULDER SURGERY Right     right rotator cuff    TRANSURETHRAL RESECTION OF PROSTATE      VASECTOMY       Family History     Problem Relation (Age of Onset)    Cancer Father, Brother, Brother    Gout Mother    Heart failure Maternal Grandmother, Paternal Grandfather    Hyperlipidemia Mother, Father    Hypertension Mother, Father    Kidney disease Father        Social History     Socioeconomic History    Marital status:      Spouse name: Not on file    Number of children: Not on file    Years of education: Not on file    Highest education level: Not on file   Occupational History    Occupation:      Employer: [employer]   Social Needs    Financial resource strain: Not on file    Food insecurity:     Worry: Not on file     Inability: Not on file    Transportation needs:     Medical: Not on file     Non-medical: Not on file   Tobacco Use    Smoking status: Never Smoker    Smokeless tobacco: Never Used   Substance and Sexual Activity    Alcohol use: No    Drug use: No    Sexual activity: Yes     Partners: Female     Birth control/protection: None   Lifestyle    Physical activity:     Days per week: Not on file     Minutes per session: Not on file    Stress: Not on file   Relationships    Social connections:     Talks on phone: Not on file     Gets together: Not on file     Attends Christianity service: Not on file     Active member of club or organization: Not on file     Attends meetings of clubs or organizations: Not on file     Relationship status: Not on file   Other Topics Concern    Not on file   Social  "History Narrative    Not on file       Review of Systems:  Review of Systems    Constitutional: no fever, chills or night sweats. No changes in weight   Eyes: no visual changes   ENT: no nasal congestion or sore throat   Respiratory: no cough or shortness of breath   Cardiovascular: no chest pain or palpitations   Gastrointestinal: no nausea or vomiting   Genitourinary: no hematuria or dysuria   Integument/Breast: no rash or pruritis   Hematologic/Lymphatic: no easy bruising or lymphadenopathy   Musculoskeletal: no arthralgias or myalgias.  Neurological: no seizures or tremors. No weakness or paresthesia.   Behavioral/Psych: no auditory or visual hallucinations   Endocrine: no heat or cold intolerance     OBJECTIVE:     Vital Signs  Temp: 97.1 °F (36.2 °C)  Pulse: 69  BP: (!) 144/83  Pain Score:   3  Height: 5' 11" (180.3 cm)  Weight: 97.5 kg (215 lb)  Body mass index is 29.99 kg/m².    Physical Exam:  Neurosurgery Physical Exam    General: well developed, well nourished, no distress.   Neurologic: Awake, alert and oriented x3. Thought content appropriate.  GCS: Motor: 6/Verbal: 5/Eyes: 4 GCS Total: 15  Cranial nerves: II-XII grossly intact. PERRLA. EOMI without nystagmus. Face symmetric and sensation intact to light touch, tongue midline, shoulder shrug symmetric bilaterally.  Hearing equal bilaterally to finger rub. Palate and uvula rise and fall normally in midline.    Language: no aphasia  Speech: no dysarthria   Neck: supple, without obvious masses    Sensory: intact to light touch B/L UE and LE  Motor Strength: Moves all extremities spontaneously with good tone. Full strength upper and lower extremities. No abnormal movements seen.    Strength  Deltoids Triceps Biceps Wrist Extension Wrist Flexion Hand    Upper: R 5/5 5/5 5/5 5/5 5/5 5/5    L 5/5 5/5 5/5 5/5 5/5 5/5     Iliopsoas Quadriceps Knee  Flexion Tibialis  anterior Gastro- cnemius EHL   Lower: R 5/5 5/5 5/5 5/5 5/5 5/5    L 5/5 5/5 5/5 5/5 5/5 5/5 "     Interossi muscle strength- intact    DTR's - 1 + and symmetric in UE and LE  Recio's - Negative        Lhermitte's - Negative  Spurlings-   Negative         Ankle Clonus - Negative           Babinski  - Negative     SLR - Negative   Gait - normal      Tandem Gait - No difficulty    Able to walk/stand on heels & toes  Cervical ROM - limited; pain with all neck ROM  Lumbar ROM - full; no pain with all ROM  No focal numbness or weakness  No midline or paraspinal tenderness to palpation  No difficulty transitioning from seated to standing position or vice versa.  ENT: normal hearing with finger rub  Heart: RRR, no cyanosis, pallor, or edema.   Lungs- normal respiratory effort  Abdomen-  soft, symmetric and nontender  Skin: grossly intact in all 4 extremities without obvious rashes or lesions  Extremities: warm with no cyanosis or edema, or clubbing  Pulses: palpable distal pulses    Posture-  No obvious kyphosis with standing posture.  With bending posture, no obvious scapula wing        Diagnostic Results:  All imaging personally reviewed    MRI cervical spine 12/11/2019   Straightening of cervical lordosis.  Multilevel degenerative disc disease and endplate changes.  Disc disease and uncovertebral spurring at C4-5, C5-6, C6-7 causing right neural foraminal narrowing.  C7-T1 disc osteophyte complex and protrusion causing moderate right neural foraminal stenosis and moderate left neural foraminal stenosis.  No significant canal stenosis.      Cervical x-rays 10/16/2019  Straightening of cervical lordosis.  Multilevel mild disc disease.     Lumbar spine x-ray 10/16/2019  Lumbar lordosis well maintained.  Mild facet arthropathy and disc disease at L5-S1.     CT abdomen and pelvis 03/30/2015 reviewed for spinal aspect  High pelvic incidence.  Mild L5-S1 disc disease that is chronic; focal calcification present at posterior disc.    ASSESSMENT/PLAN:     64 y.o.  male with history of previous , vertigo,  migraine, scoliosis, glaucoma, coronary artery disease on preventive 81 mg aspirin, hypertension, hyperlipidemia, right ear tinnitus, previous bilateral knee surgery for meniscal repair, and previous right rotator cuff surgery.    Patient presents with longstanding history of chronic posterior neck pain > 5 years.  He is neurologically intact on exam.  No concern for myelopathy at this time.  MRI cervical spine show multilevel degenerative disc disease and neural foraminal narrowing but no significant central canal stenosis.    He has failed conservative treatment. I had long discussion with patient and wife.  They are not interested in any cervical medial branch block or interventional pain procedures at this time.  After reviewing imaging with Dr. Will, at this time we are recommending posterior laminectomy C2-C7 with posterior instrumentation C3-C6 to improve his cervical spondylosis with radiculopathy.  Patient and wife are hesitant with surgery at this time.  They would like to rethink this through; I will schedule follow-up Dr. Will in the next 2 months discussed surgery in depth.    In the interim, he is to continue cervical physical therapy exercises, p.r.n. anti-inflammatory, p.r.n. Robaxin.      All imaging were reviewed with patient, family, and staff. All questions were answered.  Patient verbalized understanding and agreed with the above plan of care.  Patient was encouraged to call clinic with any future concerns prior to follow up appt. If any worsening symptoms, patient should report to ED.     Please call with any questions.    Discussed with Dr. Will and he agreed with the above plan of care.     [facility]          Note dictated with voice recognition software, please excuse any grammatical errors.

## 2020-01-06 ENCOUNTER — HOSPITAL ENCOUNTER (OUTPATIENT)
Dept: RADIOLOGY | Facility: HOSPITAL | Age: 65
Discharge: HOME | End: 2020-01-06
Attending: INTERNAL MEDICINE
Payer: COMMERCIAL

## 2020-01-06 DIAGNOSIS — E04.2 MULTIPLE THYROID NODULES: ICD-10-CM

## 2020-01-06 PROCEDURE — 76536 US EXAM OF HEAD AND NECK: CPT | Mod: TC

## 2020-01-06 PROCEDURE — 76536 US EXAM OF HEAD AND NECK: CPT | Mod: 26,,, | Performed by: RADIOLOGY

## 2020-01-06 PROCEDURE — 76536 US SOFT TISSUE HEAD NECK THYROID: ICD-10-PCS | Mod: 26,,, | Performed by: RADIOLOGY

## 2020-01-16 ENCOUNTER — LAB VISIT (OUTPATIENT)
Dept: LAB | Facility: HOSPITAL | Age: 65
End: 2020-01-16
Attending: INTERNAL MEDICINE
Payer: COMMERCIAL

## 2020-01-16 DIAGNOSIS — C61 PROSTATE CANCER: ICD-10-CM

## 2020-01-16 DIAGNOSIS — E78.5 HYPERLIPIDEMIA, UNSPECIFIED HYPERLIPIDEMIA TYPE: ICD-10-CM

## 2020-01-16 DIAGNOSIS — I10 ESSENTIAL HYPERTENSION: Chronic | ICD-10-CM

## 2020-01-16 LAB
ALBUMIN SERPL BCP-MCNC: 3.6 G/DL (ref 3.5–5.2)
ALP SERPL-CCNC: 48 U/L (ref 55–135)
ALT SERPL W/O P-5'-P-CCNC: 28 U/L (ref 10–44)
ANION GAP SERPL CALC-SCNC: 5 MMOL/L (ref 8–16)
AST SERPL-CCNC: 21 U/L (ref 10–40)
BASOPHILS # BLD AUTO: 0.04 K/UL (ref 0–0.2)
BASOPHILS NFR BLD: 0.8 % (ref 0–1.9)
BILIRUB SERPL-MCNC: 0.5 MG/DL (ref 0.1–1)
BUN SERPL-MCNC: 15 MG/DL (ref 8–23)
CALCIUM SERPL-MCNC: 9.1 MG/DL (ref 8.7–10.5)
CHLORIDE SERPL-SCNC: 104 MMOL/L (ref 95–110)
CHOLEST SERPL-MCNC: 151 MG/DL (ref 120–199)
CHOLEST/HDLC SERPL: 4.2 {RATIO} (ref 2–5)
CO2 SERPL-SCNC: 30 MMOL/L (ref 23–29)
COMPLEXED PSA SERPL-MCNC: 0.44 NG/ML (ref 0–4)
CREAT SERPL-MCNC: 1.2 MG/DL (ref 0.5–1.4)
DIFFERENTIAL METHOD: ABNORMAL
EOSINOPHIL # BLD AUTO: 0.3 K/UL (ref 0–0.5)
EOSINOPHIL NFR BLD: 5.5 % (ref 0–8)
ERYTHROCYTE [DISTWIDTH] IN BLOOD BY AUTOMATED COUNT: 14.3 % (ref 11.5–14.5)
EST. GFR  (AFRICAN AMERICAN): >60 ML/MIN/1.73 M^2
EST. GFR  (NON AFRICAN AMERICAN): >60 ML/MIN/1.73 M^2
GLUCOSE SERPL-MCNC: 95 MG/DL (ref 70–110)
HCT VFR BLD AUTO: 43.4 % (ref 40–54)
HDLC SERPL-MCNC: 36 MG/DL (ref 40–75)
HDLC SERPL: 23.8 % (ref 20–50)
HGB BLD-MCNC: 13.2 G/DL (ref 14–18)
IMM GRANULOCYTES # BLD AUTO: 0.01 K/UL (ref 0–0.04)
IMM GRANULOCYTES NFR BLD AUTO: 0.2 % (ref 0–0.5)
LDLC SERPL CALC-MCNC: 102.4 MG/DL (ref 63–159)
LYMPHOCYTES # BLD AUTO: 2.7 K/UL (ref 1–4.8)
LYMPHOCYTES NFR BLD: 50.9 % (ref 18–48)
MCH RBC QN AUTO: 24.2 PG (ref 27–31)
MCHC RBC AUTO-ENTMCNC: 30.4 G/DL (ref 32–36)
MCV RBC AUTO: 80 FL (ref 82–98)
MONOCYTES # BLD AUTO: 0.6 K/UL (ref 0.3–1)
MONOCYTES NFR BLD: 11.6 % (ref 4–15)
NEUTROPHILS # BLD AUTO: 1.6 K/UL (ref 1.8–7.7)
NEUTROPHILS NFR BLD: 31 % (ref 38–73)
NONHDLC SERPL-MCNC: 115 MG/DL
NRBC BLD-RTO: 0 /100 WBC
PLATELET # BLD AUTO: 255 K/UL (ref 150–350)
PMV BLD AUTO: 10 FL (ref 9.2–12.9)
POTASSIUM SERPL-SCNC: 4.4 MMOL/L (ref 3.5–5.1)
PROT SERPL-MCNC: 6.6 G/DL (ref 6–8.4)
RBC # BLD AUTO: 5.46 M/UL (ref 4.6–6.2)
SODIUM SERPL-SCNC: 139 MMOL/L (ref 136–145)
TRIGL SERPL-MCNC: 63 MG/DL (ref 30–150)
WBC # BLD AUTO: 5.27 K/UL (ref 3.9–12.7)

## 2020-01-16 PROCEDURE — 85025 COMPLETE CBC W/AUTO DIFF WBC: CPT

## 2020-01-16 PROCEDURE — 84153 ASSAY OF PSA TOTAL: CPT

## 2020-01-16 PROCEDURE — 36415 COLL VENOUS BLD VENIPUNCTURE: CPT | Mod: PO

## 2020-01-16 PROCEDURE — 80053 COMPREHEN METABOLIC PANEL: CPT

## 2020-01-16 PROCEDURE — 80061 LIPID PANEL: CPT

## 2020-01-20 RX ORDER — PRAVASTATIN SODIUM 40 MG/1
TABLET ORAL
Qty: 30 TABLET | Refills: 6 | Status: SHIPPED | OUTPATIENT
Start: 2020-01-20 | End: 2020-11-06 | Stop reason: SDUPTHER

## 2020-01-23 ENCOUNTER — TELEPHONE (OUTPATIENT)
Dept: INTERNAL MEDICINE | Facility: CLINIC | Age: 65
End: 2020-01-23

## 2020-01-23 DIAGNOSIS — E04.2 MULTIPLE THYROID NODULES: Primary | ICD-10-CM

## 2020-01-23 NOTE — TELEPHONE ENCOUNTER
Thyroid ultrasound was abnormal. One left-sided thyroid nodule meets criteria for fine needle aspiration.  Consultation with Endocrinology will be obtained for further evaluation and follow-up.

## 2020-01-24 ENCOUNTER — TELEPHONE (OUTPATIENT)
Dept: INTERNAL MEDICINE | Facility: CLINIC | Age: 65
End: 2020-01-24

## 2020-01-24 ENCOUNTER — OFFICE VISIT (OUTPATIENT)
Dept: INTERNAL MEDICINE | Facility: CLINIC | Age: 65
End: 2020-01-24
Payer: COMMERCIAL

## 2020-01-24 VITALS
BODY MASS INDEX: 30.71 KG/M2 | SYSTOLIC BLOOD PRESSURE: 130 MMHG | DIASTOLIC BLOOD PRESSURE: 84 MMHG | HEIGHT: 71 IN | RESPIRATION RATE: 18 BRPM | HEART RATE: 62 BPM | WEIGHT: 219.38 LBS | TEMPERATURE: 98 F

## 2020-01-24 DIAGNOSIS — I10 ESSENTIAL HYPERTENSION: Primary | ICD-10-CM

## 2020-01-24 DIAGNOSIS — R42 VERTIGO, INTERMITTENT: ICD-10-CM

## 2020-01-24 DIAGNOSIS — E78.5 HYPERLIPIDEMIA, UNSPECIFIED HYPERLIPIDEMIA TYPE: ICD-10-CM

## 2020-01-24 DIAGNOSIS — E04.2 MULTIPLE THYROID NODULES: ICD-10-CM

## 2020-01-24 DIAGNOSIS — I10 ESSENTIAL HYPERTENSION: Primary | Chronic | ICD-10-CM

## 2020-01-24 DIAGNOSIS — M47.22 CERVICAL SPONDYLOSIS WITH RADICULOPATHY: ICD-10-CM

## 2020-01-24 DIAGNOSIS — J31.0 CHRONIC RHINITIS: Chronic | ICD-10-CM

## 2020-01-24 DIAGNOSIS — C61 PROSTATE CANCER: ICD-10-CM

## 2020-01-24 DIAGNOSIS — M54.2 NECK PAIN: ICD-10-CM

## 2020-01-24 PROCEDURE — 99999 PR PBB SHADOW E&M-EST. PATIENT-LVL III: ICD-10-PCS | Mod: PBBFAC,,, | Performed by: INTERNAL MEDICINE

## 2020-01-24 PROCEDURE — 1125F AMNT PAIN NOTED PAIN PRSNT: CPT | Mod: S$GLB,,, | Performed by: INTERNAL MEDICINE

## 2020-01-24 PROCEDURE — 1159F PR MEDICATION LIST DOCUMENTED IN MEDICAL RECORD: ICD-10-PCS | Mod: S$GLB,,, | Performed by: INTERNAL MEDICINE

## 2020-01-24 PROCEDURE — 99999 PR PBB SHADOW E&M-EST. PATIENT-LVL III: CPT | Mod: PBBFAC,,, | Performed by: INTERNAL MEDICINE

## 2020-01-24 PROCEDURE — 3079F PR MOST RECENT DIASTOLIC BLOOD PRESSURE 80-89 MM HG: ICD-10-PCS | Mod: CPTII,S$GLB,, | Performed by: INTERNAL MEDICINE

## 2020-01-24 PROCEDURE — 3079F DIAST BP 80-89 MM HG: CPT | Mod: CPTII,S$GLB,, | Performed by: INTERNAL MEDICINE

## 2020-01-24 PROCEDURE — 99214 OFFICE O/P EST MOD 30 MIN: CPT | Mod: S$GLB,,, | Performed by: INTERNAL MEDICINE

## 2020-01-24 PROCEDURE — 3075F PR MOST RECENT SYSTOLIC BLOOD PRESS GE 130-139MM HG: ICD-10-PCS | Mod: CPTII,S$GLB,, | Performed by: INTERNAL MEDICINE

## 2020-01-24 PROCEDURE — 1101F PT FALLS ASSESS-DOCD LE1/YR: CPT | Mod: CPTII,S$GLB,, | Performed by: INTERNAL MEDICINE

## 2020-01-24 PROCEDURE — 99214 PR OFFICE/OUTPT VISIT, EST, LEVL IV, 30-39 MIN: ICD-10-PCS | Mod: S$GLB,,, | Performed by: INTERNAL MEDICINE

## 2020-01-24 PROCEDURE — 1159F MED LIST DOCD IN RCRD: CPT | Mod: S$GLB,,, | Performed by: INTERNAL MEDICINE

## 2020-01-24 PROCEDURE — 1101F PR PT FALLS ASSESS DOC 0-1 FALLS W/OUT INJ PAST YR: ICD-10-PCS | Mod: CPTII,S$GLB,, | Performed by: INTERNAL MEDICINE

## 2020-01-24 PROCEDURE — 3075F SYST BP GE 130 - 139MM HG: CPT | Mod: CPTII,S$GLB,, | Performed by: INTERNAL MEDICINE

## 2020-01-24 PROCEDURE — 1125F PR PAIN SEVERITY QUANTIFIED, PAIN PRESENT: ICD-10-PCS | Mod: S$GLB,,, | Performed by: INTERNAL MEDICINE

## 2020-01-24 NOTE — PROGRESS NOTES
Subjective:       Patient ID: Dani Gastelum Jr. is a 64 y.o. male.    Chief Complaint: Follow-up (6 month) and Neck Pain    HPI   The patient presents for follow-up of medical conditions.  Active medical conditions include chronic neck pain, hypertension, left thyroid nodule, intermittent vertigo.  Chronic neck pain is unchanged.  Patient also has right-sided migraine headaches.  Chronic lower back pain symptoms are stable.  He did experience transient vertigo earlier this with associated nausea.  Symptoms lasted for approximately 3 days.    The patient exercises regularly.  He is resting well at night.  He has noted tinnitus and decreased hearing.    Review of Systems   Constitutional: Negative for activity change, appetite change, fatigue and unexpected weight change.   HENT: Positive for hearing loss and tinnitus. Negative for sinus pressure and sore throat.    Eyes: Negative for visual disturbance.   Respiratory: Negative for cough, chest tightness, shortness of breath and wheezing.    Cardiovascular: Negative for chest pain, palpitations and leg swelling.   Gastrointestinal: Negative for abdominal pain, blood in stool, nausea and vomiting.   Genitourinary: Negative for dysuria, hematuria and urgency.   Musculoskeletal: Positive for arthralgias, back pain and neck pain. Negative for gait problem, joint swelling, myalgias and neck stiffness.   Skin: Negative for color change and rash.   Neurological: Positive for dizziness and headaches. Negative for syncope, weakness, light-headedness and numbness.   Psychiatric/Behavioral: Negative for sleep disturbance.       Objective:      Physical Exam   Constitutional: He is oriented to person, place, and time. He appears well-developed and well-nourished. No distress.   The patient has gained 4 lb since 06/28/2019.   HENT:   Head: Normocephalic and atraumatic.   Eyes: Conjunctivae and EOM are normal. No scleral icterus.   Neck: Normal range of motion. Neck supple. No JVD  present. No thyromegaly present.   Cardiovascular: Normal rate, regular rhythm, normal heart sounds and intact distal pulses. Exam reveals no gallop and no friction rub.   No murmur heard.  Pulmonary/Chest: Effort normal and breath sounds normal. No respiratory distress. He has no wheezes. He has no rales.   Abdominal: Soft. Bowel sounds are normal. He exhibits no mass. There is no tenderness.   Musculoskeletal: Normal range of motion. He exhibits no tenderness.   Lymphadenopathy:     He has no cervical adenopathy.   Neurological: He is alert and oriented to person, place, and time.   Gait is normal.   Skin: Skin is warm and dry. No rash noted.   Nursing note and vitals reviewed.      Results for orders placed or performed in visit on 01/16/20   CBC auto differential   Result Value Ref Range    WBC 5.27 3.90 - 12.70 K/uL    RBC 5.46 4.60 - 6.20 M/uL    Hemoglobin 13.2 (L) 14.0 - 18.0 g/dL    Hematocrit 43.4 40.0 - 54.0 %    Mean Corpuscular Volume 80 (L) 82 - 98 fL    Mean Corpuscular Hemoglobin 24.2 (L) 27.0 - 31.0 pg    Mean Corpuscular Hemoglobin Conc 30.4 (L) 32.0 - 36.0 g/dL    RDW 14.3 11.5 - 14.5 %    Platelets 255 150 - 350 K/uL    MPV 10.0 9.2 - 12.9 fL    Immature Granulocytes 0.2 0.0 - 0.5 %    Gran # (ANC) 1.6 (L) 1.8 - 7.7 K/uL    Immature Grans (Abs) 0.01 0.00 - 0.04 K/uL    Lymph # 2.7 1.0 - 4.8 K/uL    Mono # 0.6 0.3 - 1.0 K/uL    Eos # 0.3 0.0 - 0.5 K/uL    Baso # 0.04 0.00 - 0.20 K/uL    nRBC 0 0 /100 WBC    Gran% 31.0 (L) 38.0 - 73.0 %    Lymph% 50.9 (H) 18.0 - 48.0 %    Mono% 11.6 4.0 - 15.0 %    Eosinophil% 5.5 0.0 - 8.0 %    Basophil% 0.8 0.0 - 1.9 %    Differential Method Automated    Comprehensive metabolic panel   Result Value Ref Range    Sodium 139 136 - 145 mmol/L    Potassium 4.4 3.5 - 5.1 mmol/L    Chloride 104 95 - 110 mmol/L    CO2 30 (H) 23 - 29 mmol/L    Glucose 95 70 - 110 mg/dL    BUN, Bld 15 8 - 23 mg/dL    Creatinine 1.2 0.5 - 1.4 mg/dL    Calcium 9.1 8.7 - 10.5 mg/dL    Total  Protein 6.6 6.0 - 8.4 g/dL    Albumin 3.6 3.5 - 5.2 g/dL    Total Bilirubin 0.5 0.1 - 1.0 mg/dL    Alkaline Phosphatase 48 (L) 55 - 135 U/L    AST 21 10 - 40 U/L    ALT 28 10 - 44 U/L    Anion Gap 5 (L) 8 - 16 mmol/L    eGFR if African American >60.0 >60 mL/min/1.73 m^2    eGFR if non African American >60.0 >60 mL/min/1.73 m^2   Lipid panel   Result Value Ref Range    Cholesterol 151 120 - 199 mg/dL    Triglycerides 63 30 - 150 mg/dL    HDL 36 (L) 40 - 75 mg/dL    LDL Cholesterol 102.4 63.0 - 159.0 mg/dL    Hdl/Cholesterol Ratio 23.8 20.0 - 50.0 %    Total Cholesterol/HDL Ratio 4.2 2.0 - 5.0    Non-HDL Cholesterol 115 mg/dL   Prostate Specific Antigen, Diagnostic   Result Value Ref Range    PSA DIAGNOSTIC 0.44 0.00 - 4.00 ng/mL       Assessment:       1. Essential hypertension    2. Vertigo, intermittent    3. Chronic rhinitis    4. Cervical spondylosis with radiculopathy    5. Neck pain    6. Multiple thyroid nodules        Plan:       Dani was seen today for follow-up and neck pain. Endocrinology consultation will be obtained regarding left thyroid nodule.  Current therapy will be continued.  The patient is to return to clinic in 6 months.    Diagnoses and all orders for this visit:    Essential hypertension    Vertigo, intermittent    Chronic rhinitis    Cervical spondylosis with radiculopathy    Neck pain

## 2020-01-24 NOTE — TELEPHONE ENCOUNTER
Dr alvarez didn't feel he needed more labs in 6 mos but patient prefers he have done because his insurance will cover.    He wanted cholesterol, cbc and psa rechecked.    I put in orders and scheduled labs at his request.

## 2020-01-24 NOTE — TELEPHONE ENCOUNTER
----- Message from Eileen Armendariz sent at 1/24/2020  1:49 PM CST -----  Contact: self   Patient is returning a phone call.  Who left a message for the patient: Ashley Brahser LPN    Does patient know what this is regarding:  Labs  Comments:

## 2020-01-27 ENCOUNTER — PATIENT OUTREACH (OUTPATIENT)
Dept: ADMINISTRATIVE | Facility: OTHER | Age: 65
End: 2020-01-27

## 2020-01-30 ENCOUNTER — TELEPHONE (OUTPATIENT)
Dept: ENDOCRINOLOGY | Facility: CLINIC | Age: 65
End: 2020-01-30

## 2020-01-30 ENCOUNTER — OFFICE VISIT (OUTPATIENT)
Dept: ENDOCRINOLOGY | Facility: CLINIC | Age: 65
End: 2020-01-30
Payer: COMMERCIAL

## 2020-01-30 ENCOUNTER — LAB VISIT (OUTPATIENT)
Dept: LAB | Facility: HOSPITAL | Age: 65
End: 2020-01-30
Attending: INTERNAL MEDICINE
Payer: COMMERCIAL

## 2020-01-30 VITALS
HEIGHT: 71 IN | DIASTOLIC BLOOD PRESSURE: 74 MMHG | WEIGHT: 220.69 LBS | OXYGEN SATURATION: 97 % | SYSTOLIC BLOOD PRESSURE: 119 MMHG | HEART RATE: 63 BPM | BODY MASS INDEX: 30.9 KG/M2

## 2020-01-30 DIAGNOSIS — E04.2 MULTINODULAR GOITER: ICD-10-CM

## 2020-01-30 DIAGNOSIS — E04.2 MULTIPLE THYROID NODULES: ICD-10-CM

## 2020-01-30 LAB
T4 FREE SERPL-MCNC: 1.07 NG/DL (ref 0.71–1.51)
TSH SERPL DL<=0.005 MIU/L-ACNC: 0.87 UIU/ML (ref 0.4–4)

## 2020-01-30 PROCEDURE — 3078F DIAST BP <80 MM HG: CPT | Mod: CPTII,S$GLB,, | Performed by: INTERNAL MEDICINE

## 2020-01-30 PROCEDURE — 99204 OFFICE O/P NEW MOD 45 MIN: CPT | Mod: S$GLB,,, | Performed by: INTERNAL MEDICINE

## 2020-01-30 PROCEDURE — 1125F AMNT PAIN NOTED PAIN PRSNT: CPT | Mod: S$GLB,,, | Performed by: INTERNAL MEDICINE

## 2020-01-30 PROCEDURE — 3074F SYST BP LT 130 MM HG: CPT | Mod: CPTII,S$GLB,, | Performed by: INTERNAL MEDICINE

## 2020-01-30 PROCEDURE — 36415 COLL VENOUS BLD VENIPUNCTURE: CPT | Mod: PN

## 2020-01-30 PROCEDURE — 1101F PT FALLS ASSESS-DOCD LE1/YR: CPT | Mod: CPTII,S$GLB,, | Performed by: INTERNAL MEDICINE

## 2020-01-30 PROCEDURE — 1125F PR PAIN SEVERITY QUANTIFIED, PAIN PRESENT: ICD-10-PCS | Mod: S$GLB,,, | Performed by: INTERNAL MEDICINE

## 2020-01-30 PROCEDURE — 84443 ASSAY THYROID STIM HORMONE: CPT

## 2020-01-30 PROCEDURE — 99999 PR PBB SHADOW E&M-EST. PATIENT-LVL IV: ICD-10-PCS | Mod: PBBFAC,,, | Performed by: INTERNAL MEDICINE

## 2020-01-30 PROCEDURE — 99999 PR PBB SHADOW E&M-EST. PATIENT-LVL IV: CPT | Mod: PBBFAC,,, | Performed by: INTERNAL MEDICINE

## 2020-01-30 PROCEDURE — 3074F PR MOST RECENT SYSTOLIC BLOOD PRESSURE < 130 MM HG: ICD-10-PCS | Mod: CPTII,S$GLB,, | Performed by: INTERNAL MEDICINE

## 2020-01-30 PROCEDURE — 3078F PR MOST RECENT DIASTOLIC BLOOD PRESSURE < 80 MM HG: ICD-10-PCS | Mod: CPTII,S$GLB,, | Performed by: INTERNAL MEDICINE

## 2020-01-30 PROCEDURE — 1159F MED LIST DOCD IN RCRD: CPT | Mod: S$GLB,,, | Performed by: INTERNAL MEDICINE

## 2020-01-30 PROCEDURE — 1159F PR MEDICATION LIST DOCUMENTED IN MEDICAL RECORD: ICD-10-PCS | Mod: S$GLB,,, | Performed by: INTERNAL MEDICINE

## 2020-01-30 PROCEDURE — 99204 PR OFFICE/OUTPT VISIT, NEW, LEVL IV, 45-59 MIN: ICD-10-PCS | Mod: S$GLB,,, | Performed by: INTERNAL MEDICINE

## 2020-01-30 PROCEDURE — 1101F PR PT FALLS ASSESS DOC 0-1 FALLS W/OUT INJ PAST YR: ICD-10-PCS | Mod: CPTII,S$GLB,, | Performed by: INTERNAL MEDICINE

## 2020-01-30 PROCEDURE — 84439 ASSAY OF FREE THYROXINE: CPT

## 2020-01-30 NOTE — TELEPHONE ENCOUNTER
----- Message from Cedrick Huang sent at 1/30/2020  1:05 PM CST -----  Contact: [phone] / self   Patient would like to speak with your office regarding information about a test he had done in 2009. Please Advise.

## 2020-01-30 NOTE — ASSESSMENT & PLAN NOTE
64-year-old male here for evaluation of multinodular goiter.    Patient is clinically euthyroid and has no clinical features suspicious of local compressive symptoms. Will order TFTs today .    Discussed and reviewed ultrasound with patient in detail.  Based on the size and characteristics of the nodules we do recommend FNA.    Discussed with patient in detail about FNA and management of nodules.  Will schedule for FNA at the [facility].

## 2020-01-30 NOTE — LETTER
January 30, 2020      Frandy Hill MD  [address]  [place] [address]           [place] - Endocrinology  [facility] [facility] [address]  Phone: [phone]  Fax: [phone]          Patient: Dani Gastelum Jr.   MR Number: [id number]   YOB: 1955   Date of Visit: 1/30/2020       Dear Dr. Frandy Hill:    Thank you for referring Dani Gastelum to me for evaluation. Attached you will find relevant portions of my assessment and plan of care.    If you have questions, please do not hesitate to call me. I look forward to following Dani Gastelum along with you.    Sincerely,    Wiliam Winslow MD    Enclosure  CC:  No Recipients    If you would like to receive this communication electronically, please contact [email or web address] or [phone] to request more information on [employer] access.    For providers and/or their staff who would like to refer a patient to [facility], please contact us through our one-stop-shop provider referral line, [facility], at [phone].    If you feel you have received this communication in error or would no longer like to receive these types of communications, please e-mail [email or web address]

## 2020-01-30 NOTE — TELEPHONE ENCOUNTER
Cedrick Swain Staff   Caller: [phone] / self (Today,  1:05 PM)             Patient would like to speak with your office regarding information about a test he had done in 2009. Please Advise.        I spoke with the pt and he informed me that he was able to find a paper showing the date he had his biospy or scan done but he isn't able to find the results.  has already stated she will look into it. I told the pt and he voices understanding.

## 2020-01-30 NOTE — PROGRESS NOTES
"  Subjective:      Patient ID: Dani Gastelum Jr. is a 64 y.o. male.    Chief Complaint:  Multinodular goiter    History of Present Illness     64-year-old male with the blood pressure, dyslipidemia referred here for evaluation of thyroid nodule.  Medical history also notable for longstanding history of chronic posterior neck pain > 5 years, following with Neurosurgery and had  MRI cervical spine which showed incidental finding of multiple thyroid nodules.     It was confirmed on thyroid u/s done on 1/6/2020.  On interviewing today patient stated he did have biopsy >5 years ago and was benign. Unfortunately path report not available.  Also no prior ultrasound report available for review    On interviewing today patient  states he is doing well overall  Denies anterior neck pain,  swelling, dysphagia,odynophagia, hoarseness, cough,       No signs or symptoms  suggestive of  overt hyper or hypothyroidism.   No chest pain, sob, tremors or palpitations  No heat or cold intolerance   No hair nail or skin changes  No hyperdefecation/constipation.  No unexplained anxiety, impaired mentation, or depressed mood.       No weight changes  Reports stable weight from the last  + sleep issues sec to neck pain         There is no history of childhood neck irradiation.  There is no known family history of thyroid disease/thyroid cancer.      Review of Systems     Review of 8 systems negative except as documented above and the following  Ringing in ears and vertigo     Objective:     /74   Pulse 63   Ht 5' 11" (1.803 m)   Wt 100.1 kg (220 lb 10.9 oz)   SpO2 97%   BMI 30.78 kg/m²   BP Readings from Last 3 Encounters:   01/30/20 119/74   01/24/20 130/84   01/03/20 (!) 144/83     Wt Readings from Last 1 Encounters:   01/30/20 1045 100.1 kg (220 lb 10.9 oz)     Body mass index is 30.78 kg/m².      Physical Exam   Constitutional: He is oriented to person, place, and time. He appears well-developed and well-nourished.   HENT: "   Right Ear: External ear normal.   Left Ear: External ear normal.   Nose: Nose normal.   Neck: Normal range of motion. No tracheal deviation present. Thyromegaly present.   Cardiovascular: Normal rate and normal heart sounds.   Pulmonary/Chest: Effort normal and breath sounds normal. No respiratory distress.   Musculoskeletal: Normal range of motion. He exhibits no edema.   Neurological: He is alert and oriented to person, place, and time.   Skin: No rash noted.   Psychiatric: He has a normal mood and affect. Judgment normal.   Vitals reviewed.         Lab Results   Component Value Date    TSH 0.937 09/02/2016    TSH 0.972 09/02/2015    TSH 0.615 03/24/2015     US SOFT TISSUE HEAD NECK THYROID    CLINICAL HISTORY:  Nontoxic multinodular goiter    TECHNIQUE:  Ultrasound of the thyroid.    COMPARISON:  Thyroid ultrasound dated 12/08/2009    FINDINGS:  Right thyroid lobe measures 5.4 x 2.3 x 2.2 cm.  Measured nodules as follows: (1) solid-appearing, isoechoic nodule with ill-defined margins measuring 1.0 x 0.8 x 1.0 cm, (2) heterogeneously anechoic nodule with smooth appearing margins and posterior through transmission measuring 1.0 x 0.8 x 1.0 cm, (3) solid-appearing, relatively isoechoic nodule with ill-defined margins measuring 0.8 x 0.5 x 1.0 cm.    Left thyroid lobe measures 6.5 x 2.4 x 2.4 cm.  Measured nodules as follows: (4) solid-appearing hypoechoic nodule measuring 1.5 x 1.3 x 1.6 cm, (5) cystic, predominantly solid appearing nodule which appears isoechoic with ill-defined margins measuring 1.3 x 1.0 x 1.3 cm, (6) isoechoic solid-appearing nodule with ill-defined margins measuring 1.7 x 1.1 x 1.7 cm, (7) hypoechoic solid-appearing nodule measuring 0.9 x 0.9 x 1.0 cm.    Isthmus measures 0.35 cm.    No evidence for cervical adenopathy.      Impression       Multinodular thyroid.  Direct comparison with previous ultrasound is limited given variations in user technique.  Left lobe nodule which meets criteria  for FNA guided sampling if not already performed (labeled nodule 4 above).  Correlation with pathologic history.  Additional nodules requiring subsequent follow-up.       Assessment and Plan     Multinodular goiter  64-year-old male here for evaluation of multinodular goiter.    Patient is clinically euthyroid and has no clinical features suspicious of local compressive symptoms. Will order TFTs today .    Discussed and reviewed ultrasound with patient in detail.  Based on the size and characteristics of the nodules we do recommend FNA.    Discussed with patient in detail about FNA and management of nodules.  Will schedule for FNA at the [facility].        Patient verbalized understanding of above documented plan.  35 minutes was spent with the patient with more than 50% spent counseling/educating thyroid nodule

## 2020-02-03 ENCOUNTER — TELEPHONE (OUTPATIENT)
Dept: ENDOCRINOLOGY | Facility: CLINIC | Age: 65
End: 2020-02-03

## 2020-02-03 NOTE — TELEPHONE ENCOUNTER
MD Jorge Masterson MA             Yes.  His thyroid function is normal but not the structure of thyroid-he has nodule and it needs to be biopsied        Mulu: I spoke with the pt and he voices understanding. He will still go to his apt on 03/03/2020 at 3:15pm at [facility].

## 2020-02-03 NOTE — TELEPHONE ENCOUNTER
MD Jorge Masterson MA             Please inform patient that his thyroid blood test results are normal             Pt voices understanding. I also sent a message to  in regards to the pt's Biopsy.  The pt wanted to know if he still needed to have biopsy done since he lab work came back normal.

## 2020-02-03 NOTE — PROGRESS NOTES
Yes.  His thyroid function is normal but not the structure of thyroid-he has nodule and it needs to be biopsied

## 2020-02-03 NOTE — TELEPHONE ENCOUNTER
----- Message from Wiliam Winslow MD sent at 2/3/2020  9:27 AM CST -----  Yes.  His thyroid function is normal but not the structure of thyroid-he has nodule and it needs to be biopsied

## 2020-03-03 ENCOUNTER — HOSPITAL ENCOUNTER (OUTPATIENT)
Dept: ENDOCRINOLOGY | Facility: CLINIC | Age: 65
Discharge: HOME | End: 2020-03-03
Attending: INTERNAL MEDICINE
Payer: COMMERCIAL

## 2020-03-03 ENCOUNTER — PATIENT OUTREACH (OUTPATIENT)
Dept: ADMINISTRATIVE | Facility: OTHER | Age: 65
End: 2020-03-03

## 2020-03-03 DIAGNOSIS — E04.2 MULTINODULAR GOITER: ICD-10-CM

## 2020-03-03 PROCEDURE — 88173 PR  INTERPRETATION OF FNA SMEAR: ICD-10-PCS | Mod: 26,,, | Performed by: PATHOLOGY

## 2020-03-03 PROCEDURE — 10005 FNA BX W/US GDN 1ST LES: CPT | Mod: S$GLB,,, | Performed by: INTERNAL MEDICINE

## 2020-03-03 PROCEDURE — 10006 FNA BX W/US GDN EA ADDL: CPT | Mod: S$GLB,,, | Performed by: INTERNAL MEDICINE

## 2020-03-03 PROCEDURE — 88173 CYTOPATH EVAL FNA REPORT: CPT | Mod: 26,,, | Performed by: PATHOLOGY

## 2020-03-03 PROCEDURE — 10005 US FINE NEEDLE ASPIRATION BIOPSY, FIRST LESION: ICD-10-PCS | Mod: S$GLB,,, | Performed by: INTERNAL MEDICINE

## 2020-03-03 PROCEDURE — 10006 US FINE NEEDLE ASPIRATION THYROID EA ADDITIONAL LESION: ICD-10-PCS | Mod: S$GLB,,, | Performed by: INTERNAL MEDICINE

## 2020-03-03 PROCEDURE — 88173 CYTOPATH EVAL FNA REPORT: CPT | Mod: 59 | Performed by: PATHOLOGY

## 2020-03-04 ENCOUNTER — OFFICE VISIT (OUTPATIENT)
Dept: NEUROSURGERY | Facility: CLINIC | Age: 65
End: 2020-03-04
Payer: COMMERCIAL

## 2020-03-04 VITALS
HEART RATE: 69 BPM | TEMPERATURE: 98 F | SYSTOLIC BLOOD PRESSURE: 140 MMHG | BODY MASS INDEX: 30.8 KG/M2 | WEIGHT: 220 LBS | DIASTOLIC BLOOD PRESSURE: 77 MMHG | HEIGHT: 71 IN

## 2020-03-04 DIAGNOSIS — M54.2 CHRONIC NECK PAIN: ICD-10-CM

## 2020-03-04 DIAGNOSIS — M47.812 CERVICAL SPONDYLOSIS WITHOUT MYELOPATHY: Primary | ICD-10-CM

## 2020-03-04 DIAGNOSIS — G89.29 CHRONIC NECK PAIN: ICD-10-CM

## 2020-03-04 PROCEDURE — 99213 PR OFFICE/OUTPT VISIT, EST, LEVL III, 20-29 MIN: ICD-10-PCS | Mod: S$GLB,,, | Performed by: NEUROLOGICAL SURGERY

## 2020-03-04 PROCEDURE — 1125F PR PAIN SEVERITY QUANTIFIED, PAIN PRESENT: ICD-10-PCS | Mod: S$GLB,,, | Performed by: NEUROLOGICAL SURGERY

## 2020-03-04 PROCEDURE — 3077F SYST BP >= 140 MM HG: CPT | Mod: CPTII,S$GLB,, | Performed by: NEUROLOGICAL SURGERY

## 2020-03-04 PROCEDURE — 99999 PR PBB SHADOW E&M-EST. PATIENT-LVL III: ICD-10-PCS | Mod: PBBFAC,,, | Performed by: NEUROLOGICAL SURGERY

## 2020-03-04 PROCEDURE — 1101F PR PT FALLS ASSESS DOC 0-1 FALLS W/OUT INJ PAST YR: ICD-10-PCS | Mod: CPTII,S$GLB,, | Performed by: NEUROLOGICAL SURGERY

## 2020-03-04 PROCEDURE — 3078F DIAST BP <80 MM HG: CPT | Mod: CPTII,S$GLB,, | Performed by: NEUROLOGICAL SURGERY

## 2020-03-04 PROCEDURE — 99999 PR PBB SHADOW E&M-EST. PATIENT-LVL III: CPT | Mod: PBBFAC,,, | Performed by: NEUROLOGICAL SURGERY

## 2020-03-04 PROCEDURE — 99213 OFFICE O/P EST LOW 20 MIN: CPT | Mod: S$GLB,,, | Performed by: NEUROLOGICAL SURGERY

## 2020-03-04 PROCEDURE — 1101F PT FALLS ASSESS-DOCD LE1/YR: CPT | Mod: CPTII,S$GLB,, | Performed by: NEUROLOGICAL SURGERY

## 2020-03-04 PROCEDURE — 1159F PR MEDICATION LIST DOCUMENTED IN MEDICAL RECORD: ICD-10-PCS | Mod: S$GLB,,, | Performed by: NEUROLOGICAL SURGERY

## 2020-03-04 PROCEDURE — 3078F PR MOST RECENT DIASTOLIC BLOOD PRESSURE < 80 MM HG: ICD-10-PCS | Mod: CPTII,S$GLB,, | Performed by: NEUROLOGICAL SURGERY

## 2020-03-04 PROCEDURE — 1159F MED LIST DOCD IN RCRD: CPT | Mod: S$GLB,,, | Performed by: NEUROLOGICAL SURGERY

## 2020-03-04 PROCEDURE — 1125F AMNT PAIN NOTED PAIN PRSNT: CPT | Mod: S$GLB,,, | Performed by: NEUROLOGICAL SURGERY

## 2020-03-04 PROCEDURE — 3077F PR MOST RECENT SYSTOLIC BLOOD PRESSURE >= 140 MM HG: ICD-10-PCS | Mod: CPTII,S$GLB,, | Performed by: NEUROLOGICAL SURGERY

## 2020-03-04 NOTE — PROGRESS NOTES
NEUROSURGICAL PROGRESS NOTE    DATE OF SERVICE:  03/04/2020    ATTENDING PHYSICIAN:  Jaxson Will MD    SUBJECTIVE:    INTERIM HISTORY:    This is a very pleasant 64 y.o. male, who is following up for chronic neck pain.  He  described the pain as intermittent, midline cervical thoracic pain.  The pain is usually triggered by head rotation or head extension.  He denies having pain radiation in the shoulder or arms.  No hand weakness or numbness.  He is not dropping things.  No difficulty buttoning shirts.  No gait imbalance.  No sphincter dysfunction.  He has done physical therapy and is doing his home physical therapy exercises.  He reports mild pain relief.  He is interested in trying a home traction device.         Neck Disability  Neck Disability-Pain Score: 3  Neck Disability-Pain Intensity: The pain is moderate at the moment  Neck Disability-Personal Care: I can look after myself normally without causing extra pain  Neck Disability-Lifting: I can lift heavy weights but it gives extra pain  Neck Disability-Reading: I can read as much as I want to, with moderate pain in my neck  Neck Disability-Headaches: I have slight headaches that come infrequently  Neck Disability-Concentration: I can concentrate fully, when I want to, with no difficulty  Neck Disability-work: I can do my usual work, but no more  Neck Disability-Driving: I can drive my car as long as I want with slight pain in my neck  Neck Disability-Sleeping: My sleep is slightly disturbed (less than 1 hr sleeplessness)  Neck Disability-Recreation: I am able to engage in most but not all of my usual recreation activiites because of pain in my neck    PAST MEDICAL HISTORY:  Active Ambulatory Problems     Diagnosis Date Noted    Chronic rhinitis     Hyperlipidemia     Vertigo, intermittent     Joint pain 07/31/2012    Localized primary osteoarthritis of lower leg 06/04/2013    Medial meniscus tear 06/04/2013    Essential hypertension 06/05/2013     S/P knee surgery, medial menisectomy 07/01/2013    S/P TURP 09/04/2014    DDD (degenerative disc disease), thoracic 05/15/2015    Primary localized osteoarthrosis, lower leg 08/27/2015    Prostate cancer 02/29/2016    Heart palpitations 10/04/2016    Thalassemia, beta     Coronary artery calcification seen on CAT scan 03/13/2017    Neck pain 11/06/2019    Low back pain 11/06/2019    Cervical spondylosis with radiculopathy 01/03/2020    Multinodular goiter 01/30/2020     Resolved Ambulatory Problems     Diagnosis Date Noted    BPH (benign prostatic hypertrophy)     Chronic LBP 05/15/2015     Past Medical History:   Diagnosis Date    ALLERGIC RHINITIS     Allergy     BPH (benign prostatic hypertrophy)     Hypertension        PAST SURGICAL HISTORY:  Past Surgical History:   Procedure Laterality Date    KNEE SURGERY Right     NASAL SEPTUM SURGERY      SHOULDER SURGERY Right     right rotator cuff    TRANSURETHRAL RESECTION OF PROSTATE      VASECTOMY         SOCIAL HISTORY:   Social History     Socioeconomic History    Marital status:      Spouse name: Not on file    Number of children: Not on file    Years of education: Not on file    Highest education level: Not on file   Occupational History    Occupation:      Employer: [employer]   Social Needs    Financial resource strain: Not on file    Food insecurity:     Worry: Not on file     Inability: Not on file    Transportation needs:     Medical: Not on file     Non-medical: Not on file   Tobacco Use    Smoking status: Never Smoker    Smokeless tobacco: Never Used   Substance and Sexual Activity    Alcohol use: No    Drug use: No    Sexual activity: Yes     Partners: Female     Birth control/protection: None   Lifestyle    Physical activity:     Days per week: Not on file     Minutes per session: Not on file    Stress: Not on file   Relationships    Social connections:     Talks on phone: Not on file      Gets together: Not on file     Attends [employer] service: Not on file     Active member of club or organization: Not on file     Attends meetings of clubs or organizations: Not on file     Relationship status: Not on file   Other Topics Concern    Not on file   Social History Narrative    Not on file       FAMILY HISTORY:  Family History   Problem Relation Age of Onset    Hyperlipidemia Mother     Gout Mother     Hypertension Mother     Hyperlipidemia Father     Hypertension Father     Kidney disease Father     Cancer Father         Lung and Renal Cancer    Cancer Brother         myeloma    Cancer Brother         prostate    Heart failure Maternal Grandmother     Heart failure Paternal Grandfather     Heart attack Neg Hx     Heart disease Neg Hx     Stroke Neg Hx        CURRENTS MEDICATIONS:  Current Outpatient Medications on File Prior to Visit   Medication Sig Dispense Refill    aspirin 81 MG Chew Take 81 mg by mouth once daily.      co-enzyme Q-10 30 mg capsule Take 30 mg by mouth once daily.      fish oil-omega-3 fatty acids 300-1,000 mg capsule Take 2 g by mouth once daily.      FOLIC ACID/MV,FE,OTHER MIN (CENTRUM ORAL) Take by mouth once daily.       glucosamine-chondroitin 500-400 mg tablet Take 1 tablet by mouth 3 (three) times daily. Pt taking one pill once a day.      hydrocodone-acetaminophen 7.5-325mg (NORCO) 7.5-325 mg per tablet TK ONE T PO Q 4 TO 6 H PRF PAIN  0    latanoprost 0.005 % ophthalmic solution INT 1 GTT IN OU QD HS  3    losartan (COZAAR) 50 MG tablet TAKE 1 TABLET BY MOUTH ONCE DAILY 30 tablet 6    pravastatin (PRAVACHOL) 40 MG tablet TAKE 1 TABLET BY MOUTH EVERY DAY 30 tablet 11    pravastatin (PRAVACHOL) 40 MG tablet TAKE ONE TABLET BY MOUTH EVERY DAY 30 tablet 6    VOLTAREN 1 % Gel       fluticasone (FLONASE) 50 mcg/actuation nasal spray SPRAY TWICE IN NOSTRIL(S) DAILY AS NEEDED FOR ALLERGIC RHINITIS (Patient not taking: Reported on 3/4/2020) 1 Bottle 6      No current facility-administered medications on file prior to visit.        ALLERGIES:  Review of patient's allergies indicates:  No Known Allergies    REVIEW OF SYSTEMS:  Review of Systems   Constitutional: Negative for diaphoresis, fever and weight loss.   Respiratory: Negative for shortness of breath.    Cardiovascular: Negative for chest pain.   Gastrointestinal: Negative for blood in stool.   Genitourinary: Negative for hematuria.   Endo/Heme/Allergies: Does not bruise/bleed easily.   All other systems reviewed and are negative.        OBJECTIVE:    PHYSICAL EXAMINATION:   Vitals:    03/04/20 0925   BP: (!) 140/77   Pulse: 69   Temp: 97.5 °F (36.4 °C)       Physical Exam:  Vitals reviewed.    Constitutional: He appears well-developed and well-nourished.     Eyes: Pupils are equal, round, and reactive to light. Conjunctivae and EOM are normal.     Cardiovascular: Normal distal pulses and no edema.     Abdominal: Soft.     Skin: Skin displays no rash on trunk and no rash on extremities. Skin displays no lesions on trunk and no lesions on extremities.     Psych/Behavior: He is alert. He is oriented to person, place, and time. He has a normal mood and affect.     Musculoskeletal:        Neck: Range of motion is limited. ROM severity is 65° bilaterally.     Neurological:        DTRs: Tricep reflexes are 2+ on the right side and 2+ on the left side. Bicep reflexes are 2+ on the right side and 2+ on the left side. Brachioradialis reflexes are 2+ on the right side and 2+ on the left side. Patellar reflexes are 2+ on the right side and 2+ on the left side. Achilles reflexes are 2+ on the right side and 2+ on the left side.       Back Exam     Tenderness   The patient is experiencing tenderness in the cervical.    Range of Motion   Extension: normal   Flexion: normal   Lateral bend right: normal   Lateral bend left: normal   Rotation right: normal   Rotation left: normal     Muscle Strength   Right Quadriceps:  5/5    Left Quadriceps:  5/5   Right Hamstrings:  5/5   Left Hamstrings:  5/5     Tests   Straight leg raise right: negative  Straight leg raise left: negative    Other   Toe walk: normal  Heel walk: normal                Neurologic Exam     Mental Status   Oriented to person, place, and time.   Speech: speech is normal   Level of consciousness: alert    Cranial Nerves   Cranial nerves II through XII intact.     CN III, IV, VI   Pupils are equal, round, and reactive to light.  Extraocular motions are normal.     Motor Exam   Muscle bulk: normal  Overall muscle tone: normal    Strength   Right deltoid: 5/5  Left deltoid: 5/5  Right biceps: 5/5  Left biceps: 5/5  Right triceps: 5/5  Left triceps: 5/5  Right wrist flexion: 5/5  Left wrist flexion: 5/5  Right wrist extension: 5/5  Left wrist extension: 5/5  Right interossei: 5/5  Left interossei: 5/5  Right iliopsoas: 5/5  Left iliopsoas: 5/5  Right quadriceps: 5/5  Left quadriceps: 5/5  Right hamstrin/5  Left hamstrin/5  Right anterior tibial: 5/5  Left anterior tibial: 5/5  Right posterior tibial: 5/5  Left posterior tibial: 5/5  Right peroneal: 5/5  Left peroneal: 5/5  Right gastroc: 5/5  Left gastroc: 5/5    Sensory Exam   Light touch normal.   Pinprick normal.     Gait, Coordination, and Reflexes     Gait  Gait: normal    Coordination   Finger to nose coordination: normal  Tandem walking coordination: normal    Reflexes   Right brachioradialis: 2+  Left brachioradialis: 2+  Right biceps: 2+  Left biceps: 2+  Right triceps: 2+  Left triceps: 2+  Right patellar: 2+  Left patellar: 2+  Right achilles: 2+  Left achilles: 2+  Right plantar: normal  Left plantar: normal  Right Recio: absent  Left Recio: absent  Right ankle clonus: absent  Left ankle clonus: absent        DIAGNOSTIC DATA:  I personally interpreted the following imaging:   Cervical spine MRI 2019 is showing diffuse spondylosis worse at C4-5 and C6-7 with moderate spinal canal  stenosis.  Cervical flexion extensions films did not show spinal instability    ASSESMENT:  This is a 64 y.o. male with     Problem List Items Addressed This Visit     None      Visit Diagnoses     Cervical spondylosis without myelopathy    -  Primary    Chronic neck pain                PLAN:  Continue home physical therapy exercises  Will order home neck traction device  All questions answered  Patient will follow-up as needed if develops signs of radiculopathy or myelopathy            Jaxson Will MD  Cell:[phone]

## 2020-03-05 LAB
FINAL PATHOLOGIC DIAGNOSIS: ABNORMAL
FINAL PATHOLOGIC DIAGNOSIS: NORMAL

## 2020-03-06 ENCOUNTER — TELEPHONE (OUTPATIENT)
Dept: SURGERY | Facility: CLINIC | Age: 65
End: 2020-03-06

## 2020-03-06 ENCOUNTER — TELEPHONE (OUTPATIENT)
Dept: INTERNAL MEDICINE | Facility: CLINIC | Age: 65
End: 2020-03-06

## 2020-03-06 ENCOUNTER — TELEPHONE (OUTPATIENT)
Dept: ENDOCRINOLOGY | Facility: CLINIC | Age: 65
End: 2020-03-06

## 2020-03-06 DIAGNOSIS — E04.2 MULTINODULAR GOITER: Primary | ICD-10-CM

## 2020-03-06 NOTE — TELEPHONE ENCOUNTER
Attempted to contact patient to get him scheduled to see Dr. Hein. Unable to reach patient at this time, voicemail left with my name and direct call back number.     ----- Message from Betty Chase sent at 3/6/2020  1:18 PM CST -----  Good Morning,   The patient has a new referral from Dr. Wiliam Winslow, the diagnosis is Multinodular goiter. She is requesting the patient be scheduled with Dr. Hein. Can you assist?    Thank you

## 2020-03-06 NOTE — TELEPHONE ENCOUNTER
----- Message from Fransisco Smyth sent at 3/6/2020 12:47 PM CST -----  Contact: Self [phone]  Patient is calling to get an copy of the biopsy result-papillary thyroid carcinoma, pt e-mail [email or web address] or he can come and pick it up from the office, please advise.

## 2020-03-06 NOTE — TELEPHONE ENCOUNTER
Spoke with patient and informed about the biopsy result-papillary thyroid carcinoma. Discussed next steps. Will order surgery consult today

## 2020-03-06 NOTE — TELEPHONE ENCOUNTER
Patient called in to be scheduled for a consult with Dr. Hein. Patient scheduled for 3/19/2020 at 2:15 PM at [facility], 2nd floor, [facility]. Location, date, and time reviewed. Patient verbalized understanding.

## 2020-03-12 ENCOUNTER — TELEPHONE (OUTPATIENT)
Dept: INTERNAL MEDICINE | Facility: CLINIC | Age: 65
End: 2020-03-12

## 2020-03-12 ENCOUNTER — TELEPHONE (OUTPATIENT)
Dept: RHEUMATOLOGY | Facility: CLINIC | Age: 65
End: 2020-03-12

## 2020-03-12 NOTE — TELEPHONE ENCOUNTER
----- Message from Sadia Robles, RN sent at 3/12/2020  3:08 PM CDT -----  Contact: pt  It looks like we received this message by mistake. Thanks    Sadia  ----- Message -----  From: Mónica Linares  Sent: 3/12/2020   2:50 PM CDT  To: Melvina LINDQUIST Staff    Pt calling    Just did biopsy of thyroid   Now scheduled for another ultrasound        What is the reason why another ultrsound  ?   Plus needed to change date of ultrasound if possible   He needs to bring this mother to an appt on 3/31        If there's nothing you  Can do with the date, he'll just keep it      Please call pt [phone]    Thanks

## 2020-03-12 NOTE — TELEPHONE ENCOUNTER
Emily Swain Staff   Caller: Dani      tel:    [phone]     (Today, 10:06 AM)             Caller says   3/31  Is not a good day for him.   Wants to discuss it with the nurse about changing the time to late morning and to another date. Also, cannot do this on the 3/20th.    Pls call to discuss another date.    Caller says he just had a biopsy on 3/3.     Pls call today.          Mulu: I tried to contact the patient but he did not answer the phone. I could not leave a voicemail.

## 2020-03-12 NOTE — TELEPHONE ENCOUNTER
spoke with the patient-He needs  ultrasound for lymph node mapping.   patient  initially wanted to know if the appointment time can be rescheduled?   However now wants to keep his current terrell with no changes

## 2020-03-17 ENCOUNTER — PATIENT MESSAGE (OUTPATIENT)
Dept: ENDOCRINOLOGY | Facility: CLINIC | Age: 65
End: 2020-03-17

## 2020-03-18 ENCOUNTER — TELEPHONE (OUTPATIENT)
Dept: ENDOCRINOLOGY | Facility: CLINIC | Age: 65
End: 2020-03-18

## 2020-03-18 NOTE — TELEPHONE ENCOUNTER
Please send him his thyroid FNA report    Mulu: The patient has already picked up results today from the  [facility].

## 2020-03-26 ENCOUNTER — TELEPHONE (OUTPATIENT)
Dept: SURGERY | Facility: CLINIC | Age: 65
End: 2020-03-26

## 2020-03-26 NOTE — TELEPHONE ENCOUNTER
Contacted patient to reschedule appointment with Dr. Hein. Patient scheduled for 3/31/2020 at 1130 at [facility]. Location, date, and time reviewed. Patient verbalized understanding.

## 2020-03-30 ENCOUNTER — TELEPHONE (OUTPATIENT)
Dept: INTERNAL MEDICINE | Facility: CLINIC | Age: 65
End: 2020-03-30

## 2020-03-30 ENCOUNTER — OFFICE VISIT (OUTPATIENT)
Dept: URGENT CARE | Facility: CLINIC | Age: 65
End: 2020-03-30
Payer: COMMERCIAL

## 2020-03-30 ENCOUNTER — PATIENT OUTREACH (OUTPATIENT)
Dept: ADMINISTRATIVE | Facility: OTHER | Age: 65
End: 2020-03-30

## 2020-03-30 VITALS
TEMPERATURE: 98 F | WEIGHT: 220 LBS | RESPIRATION RATE: 16 BRPM | BODY MASS INDEX: 30.8 KG/M2 | HEIGHT: 71 IN | HEART RATE: 62 BPM | OXYGEN SATURATION: 97 % | DIASTOLIC BLOOD PRESSURE: 80 MMHG | SYSTOLIC BLOOD PRESSURE: 156 MMHG

## 2020-03-30 DIAGNOSIS — K52.9 GASTROENTERITIS: Primary | ICD-10-CM

## 2020-03-30 DIAGNOSIS — R51.9 NONINTRACTABLE HEADACHE, UNSPECIFIED CHRONICITY PATTERN, UNSPECIFIED HEADACHE TYPE: ICD-10-CM

## 2020-03-30 PROCEDURE — 99214 OFFICE O/P EST MOD 30 MIN: CPT | Mod: 25,S$GLB,, | Performed by: FAMILY MEDICINE

## 2020-03-30 PROCEDURE — 96372 PR INJECTION,THERAP/PROPH/DIAG2ST, IM OR SUBCUT: ICD-10-PCS | Mod: S$GLB,,, | Performed by: FAMILY MEDICINE

## 2020-03-30 PROCEDURE — 96372 THER/PROPH/DIAG INJ SC/IM: CPT | Mod: S$GLB,,, | Performed by: FAMILY MEDICINE

## 2020-03-30 PROCEDURE — 99214 PR OFFICE/OUTPT VISIT, EST, LEVL IV, 30-39 MIN: ICD-10-PCS | Mod: 25,S$GLB,, | Performed by: FAMILY MEDICINE

## 2020-03-30 RX ORDER — ONDANSETRON 4 MG/1
4 TABLET, ORALLY DISINTEGRATING ORAL EVERY 6 HOURS PRN
Qty: 30 TABLET | Refills: 0 | Status: SHIPPED | OUTPATIENT
Start: 2020-03-30 | End: 2020-10-13 | Stop reason: CLARIF

## 2020-03-30 RX ORDER — ACETAMINOPHEN 500 MG
1000 TABLET ORAL
Status: COMPLETED | OUTPATIENT
Start: 2020-03-30 | End: 2020-03-30

## 2020-03-30 RX ORDER — ONDANSETRON 2 MG/ML
4 INJECTION INTRAMUSCULAR; INTRAVENOUS
Status: COMPLETED | OUTPATIENT
Start: 2020-03-30 | End: 2020-03-30

## 2020-03-30 RX ADMIN — Medication 1000 MG: at 04:03

## 2020-03-30 RX ADMIN — ONDANSETRON 4 MG: 2 INJECTION INTRAMUSCULAR; INTRAVENOUS at 03:03

## 2020-03-30 NOTE — PROGRESS NOTES
[facility]  [facility]  3/30/2020      REFERRING PROVIDER: Wiliam Winslow MD  [address]  [place] [address]    SUBJECTIVE:   Mr. Dani Gastelum Jr. is a 64 y.o. male who presents today with a recent diagnosis of papillary thyroid carcinoma.     History of Present Illness: Patient has a history of chronic posterior neck pain (> 5 years) for which he is being followed by Neurosurgery.  He was sent for an MRI of the cervical spine and had an incidental finding of multiple thyroid nodules.  Ultrasound confirmed this. On 3/3/20 he had a FNA of the thyroid which revealed:    Left inferior lobe: Papillary Thyroid Carcinoma    Left superior lobe: Benign (abundant colloid and benign follicular cells present).      Past Medical History:   Diagnosis Date    ALLERGIC RHINITIS     Allergy     BPH (benign prostatic hypertrophy)     Hyperlipidemia     Hypertension     Thalassemia, beta     Vertigo, intermittent      Past Surgical History:   Procedure Laterality Date    KNEE SURGERY Right     NASAL SEPTUM SURGERY      SHOULDER SURGERY Right     right rotator cuff    TRANSURETHRAL RESECTION OF PROSTATE      VASECTOMY       Social History     Socioeconomic History    Marital status:      Spouse name: Not on file    Number of children: Not on file    Years of education: Not on file    Highest education level: Not on file   Occupational History    Occupation:      Employer: [employer]   Social Needs    Financial resource strain: Not on file    Food insecurity:     Worry: Not on file     Inability: Not on file    Transportation needs:     Medical: Not on file     Non-medical: Not on file   Tobacco Use    Smoking status: Never Smoker    Smokeless tobacco: Never Used   Substance and Sexual Activity    Alcohol use: No    Drug use: No    Sexual activity: Yes     Partners: Female     Birth control/protection: None   Lifestyle    Physical activity:     Days  per week: Not on file     Minutes per session: Not on file    Stress: Not on file   Relationships    Social connections:     Talks on phone: Not on file     Gets together: Not on file     Attends Yazidi service: Not on file     Active member of club or organization: Not on file     Attends meetings of clubs or organizations: Not on file     Relationship status: Not on file   Other Topics Concern    Not on file   Social History Narrative    Not on file     Review of patient's allergies indicates:  No Known Allergies     Family History   Problem Relation Age of Onset    Hyperlipidemia Mother     Gout Mother     Hypertension Mother     Hyperlipidemia Father     Hypertension Father     Kidney disease Father     Cancer Father         Lung and Renal Cancer    Cancer Brother         myeloma    Cancer Brother         prostate    Heart failure Maternal Grandmother     Heart failure Paternal Grandfather     Heart attack Neg Hx     Heart disease Neg Hx     Stroke Neg Hx      Review of Systems: Denies any chest pain or shortness of breath.  Denies any fever or chills.  See HPI/ Interval History for other systems reviewed.    OBJECTIVE:     Physical Exam:  HEENT: Normocephalic, atraumatic.    General: alert and oriented; no acute distress.  Neck: Range of motion intact.  Thyromegaly.    ASSESSMENT:  Mr. Dani Gastelum Jr. is a 64 y.o. year old male with papillary thyroid carcinoma.      PLAN:    We discussed that the recommended treatment would be a total thyroidectomy followed by radioactive iodine.  However, due to the impact of COVID-19, we delaying non-urgent procedures at this time.  We discussed that there is no significant risk to delaying this case 2-3 months.  We advised him to call us at the end of April to assess whether or not we are still planning on delaying non-urgent cases.      ~Radha Bautista PA-C    I agree with the physician assistant's findings, assessment , and plan as stated  above.  Abnormal left thyroid nodule found incidentally on routine cervical MRI for evaluation of cervical disc disease and degenerative joint changes.  The patient is referred by Dr. Aleta Bardales with a newly diagnosed left papillary thyroid cancer following needle biopsy on March 3, 2020.  Ultrasound had revealed bilateral thyroid nodules and he underwent biopsy of 1 of the left-sided nodules all of which appeared to be under 2 cm in size.  Fine-needle aspiration cytology revealed Whitney 6 classification consistent with papillary thyroid cancer.  There was no lymphadenopathy on that ultrasound.  He has a repeat ultrasound scheduled but I told him I did not necessarily need that since I am recommending a total thyroidectomy with central neck lymph node dissection even with no grossly abnormal lymphadenopathy on previous ultrasound in January of this year.    He denies any family history of thyroid issues and denies any significant radiation exposure other than during routine dental work on his teeth.    His TSH is within normal limits and he is clinically and biochemically euthyroid.    Family history is otherwise noncontributory.    Past surgical history is significant for a TURP    Medical history is significant for a history of prostate cancer, cardiac calcifications found on routine CT screening, glaucoma, and migraine headaches.    Social history is that he is a retired     A/P:  Papillary carcinoma of the left thyroid lobe.  Recommend total thyroidectomy with central neck level 6 lymph node dissection after April 21st which is the time when the 1 month state more toward him by the [place] Department [employer] will allow for elective cases to take place during this corona virus crisis.  I am okay with cancelling the currently scheduled repeat ultrasound of the neck  He will call us back in 1 month and hopefully the guidelines on postponement of elective surgeries will be lifted by then by the  state as well as local, regional, and national societies.  Surgical recommendation will be total thyroidectomy with level 6 central neck lymph node dissection.  We discussed the techniques, indications, timing, and rationale for postoperative radioactive iodine ablation.  We discussed surveillance with thyroglobulin levels and thyroglobulin antibodies.  We discussed thyroxine suppression postoperatively to try to maintain the TSH level in the low normal range.  We discussed lifelong thyroid hormone replacement and how we typically assess for dosing adjustments.  We discussed potential risk of recurrent laryngeal nerve injury and hypocalcemia and hypoparathyroidism which are typically low.  Patient will call us back in 1 month to schedule the surgery which we typically discussed as overnight stay with 23 hr admission.

## 2020-03-30 NOTE — PROGRESS NOTES
"Subjective:       Patient ID: Dani Gastelum Jr. is a 64 y.o. male.    Vitals:  height is 5' 11" (1.803 m) and weight is 99.8 kg (220 lb). His tympanic temperature is 97.6 °F (36.4 °C). His blood pressure is 156/80 (abnormal) and his pulse is 62. His respiration is 16 and oxygen saturation is 97%.     Chief Complaint: Emesis    Pt states nausea and vomiting with Migraine headache today. Pt suspects food poisoning.  Emesis x7 no blood.  No diarrhea normal bowel movement this morning.  No abdominal pain.  Not the worst headache of his life.  No fever cough congestion chest pain shortness of breath. No recent travel -contact with anyone who has traveled- or contact with anyone positive for covid 19.  No sick contacts raw food consumption recent antibiotic use he took an over-the-counter nausea medicine.  Patient states he does have thyroid cancer for not currently undergoing any chemo or radiation at this time    Emesis    This is a new problem. The current episode started today. The problem occurs 5 to 10 times per day. The problem has been unchanged. The emesis has an appearance of stomach contents. Associated symptoms include abdominal pain. Pertinent negatives include no arthralgias, chest pain, chills, coughing, diarrhea, dizziness, fever, headaches or myalgias. He has tried nothing for the symptoms.       Constitution: Negative for chills, fatigue and fever.   HENT: Negative for congestion and sore throat.    Neck: Negative for painful lymph nodes.   Cardiovascular: Negative for chest pain and leg swelling.   Eyes: Negative for double vision and blurred vision.   Respiratory: Negative for cough and shortness of breath.    Gastrointestinal: Positive for abdominal pain, nausea and vomiting. Negative for diarrhea.   Genitourinary: Negative for dysuria, frequency and urgency.   Musculoskeletal: Negative for joint pain, joint swelling, muscle cramps and muscle ache.   Skin: Negative for color change, pale and rash. "   Allergic/Immunologic: Negative for seasonal allergies.   Neurological: Negative for dizziness, history of vertigo, light-headedness, passing out and headaches.   Hematologic/Lymphatic: Negative for swollen lymph nodes, easy bruising/bleeding and history of blood clots. Does not bruise/bleed easily.   Psychiatric/Behavioral: Negative for nervous/anxious, sleep disturbance and depression. The patient is not nervous/anxious.        Objective:      Physical Exam   Constitutional: He is oriented to person, place, and time. He appears well-developed and well-nourished.   HENT:   Head: Normocephalic and atraumatic.   Right Ear: External ear normal.   Left Ear: External ear normal.   Nose: Nose normal.   Mouth/Throat: Mucous membranes are normal.   Eyes: Conjunctivae and lids are normal.   Neck: Trachea normal and full passive range of motion without pain. Neck supple.   Cardiovascular: Normal rate, regular rhythm and normal heart sounds.   Pulmonary/Chest: Effort normal and breath sounds normal. No accessory muscle usage. No tachypnea. No respiratory distress. He has no decreased breath sounds. He has no wheezes. He has no rhonchi. He has no rales.   Abdominal: Soft. Normal appearance. He exhibits no distension, no abdominal bruit, no pulsatile midline mass and no mass. Bowel sounds are increased. There is no tenderness. There is no rigidity, no rebound, no guarding, no CVA tenderness, no tenderness at McBurney's point and negative Riley's sign.   Abdomen soft no rebound tenderness no guarding   Musculoskeletal: Normal range of motion. He exhibits no edema.   Neurological: He is alert and oriented to person, place, and time. He has normal strength.   Skin: Skin is warm, dry, intact, not diaphoretic and not pale.   Psychiatric: He has a normal mood and affect. His speech is normal and behavior is normal. Judgment and thought content normal. Cognition and memory are normal.   Nursing note and vitals reviewed.         Assessment:       1. Gastroenteritis    2. Nonintractable headache, unspecified chronicity pattern, unspecified headache type        Plan:         Gastroenteritis  -     ondansetron injection 4 mg  -     ondansetron (ZOFRAN-ODT) 4 MG TbDL; Take 1 tablet (4 mg total) by mouth every 6 (six) hours as needed.  Dispense: 30 tablet; Refill: 0    Nonintractable headache, unspecified chronicity pattern, unspecified headache type  -     acetaminophen tablet 1,000 mg    sx improved after zofran, passed po challenge  pt has thyroid ca but not undergoing tx right now- advised if he gets fever, cough, sob needs to get covid testing gave him information how to do that. Advised ER for any severely worsening sx if he does not get tested      Patient Instructions   PLEASE READ YOUR DISCHARGE INSTRUCTIONS ENTIRELY AS IT CONTAINS IMPORTANT INFORMATION.    Take the zofran for nausea (it dissolves under your tongue)    Use gatorade/pedialyte or rehydration packets to help stay hydrated. Vitamin water and plain water do not contain rehydrating electrolytes.  Increase clear liquids (water, gatorade, pedialyte, broths, jello, etc) Hold off on solids for 12-18 hours. Then advance to BRAT diet (banana, rice, applesauce, tea, toast/crackers), then advance further as tolerated. Avoid spicy or fatty foods.   Use Peptobismol to help alleviate your diarrhea symptoms.     Avoid imodium unless you have more than 6 loose stools in 24 hours.       Wash hands frequently while sick. Avoid ibuprofen or other NSAIDS until you are well.     Please go to the ER if you experience abdominal pain, blood in your vomit or stool, high fever, dizziness, fainting, swelling of your abdomen, inability to pass gas or stool OR ANY SEVERELY WORSENING SYMPTOMS EVEN IF YOU DO NOT GET TESTED FOR COVID.     YOU WILL QUALIFY FOR COVID TESTING IF YOU DEVELOP FEVER, SHORTNESS OF BREATH, COUGH AS YOU HAVE ACTIVE CANCER DIAGNOSIS. PLEASE GO TO [facility] [place]  AT Address: [address] [place] [id number]  Hours:   Open ? Closes 7PM  Phone: [phone] OR VISIT [facility] CARE ONLINE VISIT TO BE REFERRED TO A TESTING SITE      Please arrange follow up with your primary medical clinic as soon as possible. You must understand that you've received an Urgent Care treatment only and that you may be released before all of your medical problems are known or treated. You, the patient, will arrange for follow up as instructed. If your symptoms worsen or fail to improve you should go to the Emergency Room.  WE CANNOT RULE OUT ALL POSSIBLE CAUSES OF YOUR SYMPTOMS IN THE URGENT CARE SETTING PLEASE GO TO THE ER IF YOU FEELS YOUR CONDITION IS WORSENING OR YOU WOULD LIKE EMERGENT EVALUATION.      Noninfectious Gastroenteritis (Ages 6 Years to Adult)    Gastroenteritis can cause nausea, vomiting, diarrhea, and abdominal cramping. This may occur as a result of food sensitivity, inflammation of your gastrointestinal tract, medicines, stress, or other causes not related to infection. Your symptoms will usually last from 1 to 3 days, but can last longer. Antibiotics are not effective, but simple home treatment will be helpful.  Home care  Medicine  · You may use acetaminophen or NSAID medicines like ibuprofen or naproxen to control fever, unless another medicine is prescribed. (Note: If you have chronic liver or kidney disease, or ever had a stomach ulcer or gastrointestinalI bleeding, talk with your healthcare provider before using these medicines.) Aspirin should never be used in anyone under 18 years of age who is ill with a fever. It may cause severe liver damage. Don't increase your NSAID medicines if you are already taking these medicines for another condition (like arthritis). Don't use NSAIDS if you are on aspirin (such as for heart disease, or after a stroke).  · If medicines for diarrhea or vomiting are prescribed, take only as directed.  General care and preventing  spread of the illness  · If symptoms are severe, rest at home for the next 24 hours or until you feel better.  · Hand washing with soap and water is the best way to prevent the spread of infection. Wash your hands after touching anyone who is sick.  · Wash your hands after using the toilet and before meals. Clean the toilet after each use.  · Caffeine, tobacco, and alcohol can make your diarrhea, cramping, and pain worse.  Diet  · Water and clear liquids are important so you do not get dehydrated. Drink a small amount at a time.  · Do not force yourself to eat, especially if you have cramps, vomiting, or diarrhea. When you finally decide to start eating, do not eat large amounts at a time, even if you are hungry.  · If you eat, avoid fatty, greasy, spicy, or fried foods.  · Do not eat dairy products if you have diarrhea; they can make the diarrhea worse.  During the first 24 hours (the first full day), follow the diet below:  · Beverages: Water, clear liquids, soft drinks without caffeine, like ginger ale; mineral water (plain or flavored); decaffeinated tea and coffee.  · Soups: Clear broth, consommé, and bouillon Sports drinks aren't a good choice because they have too much sugar and not enough electrolytes. In this case, commercially available products called oral rehydration solutions are best.  · Desserts: Plain gelatin, popsicles, and fruit juice bars.  During the next 24 hours (the second day), you may add the following to the above if you have improved. If not, continue what you did the first day:  · Hot cereal, plain toast, bread, rolls, crackers  · Plain noodles, rice, mashed potatoes, chicken noodle or rice soup  · Unsweetened canned fruit (avoid pineapple), bananas  · Limit caffeine and chocolate. No spices or seasonings except salt.  During the next 24 hours  · Gradually resume a normal diet, as you feel better and your symptoms improve.  · If at any time your symptoms start getting worse, go back to  clear liquids until you feel better.  Food preparation  · If you have diarrhea, you should not prepare food for others. When you  prepare food for yourself, wash your hands before and after.  · Wash your hands after using cutting boards, countertops, and knives that have been in contact with raw food.  · Keep uncooked meats away from cooked and ready-to-eat foods.  Follow-up care  Follow up with your healthcare provider if you are not improving over the next 2 to 3 days, or as advised. If a stool (diarrhea) sample was taken, call for the results as directed.  When to seek medical care  Call your healthcare provider right away if any of these occur:   · Increasing abdominal pain or constant lower right abdominal pain  · Continued vomiting (unable to keep liquids down)  · Frequent diarrhea (more than 5 times a day)  · Blood in vomit or stool (black or red color)  · Inability to tolerate solid food after a few days.  · Dark urine, reduced urine output  · Weakness, dizziness  · Drowsiness  · Fever of 100.4ºF (38.0ºC) or higher, or as directed by your healthcare provider  · New rash  Call 911  Call 911 if any of these occur:  · Trouble breathing  · Chest pain  · Confusion  · Severe drowsiness or trouble awakening  · Seizure  · Stiff neck  Date Last Reviewed: 11/16/2015 © 2000-2017 [employer]. [address] [place] [address], [place] [address]. All rights reserved. This information is not intended as a substitute for professional medical care. Always follow your healthcare professional's instructions.

## 2020-03-30 NOTE — TELEPHONE ENCOUNTER
----- Message from Michelle Benito sent at 3/30/2020  4:43 PM CDT -----  Contact: Pt's wife-- [phone]  Type:  Needs Medical Advice    Who Called:  Pt's wife     Symptoms (please be specific): stomach pain/ UC f/u    Would the patient rather a call back or a response via [facility]? Call    Best Call Back Number:  [phone]     Additional Information:  Pt's wife called to let Dr. Hill pt went to urgent care for stomach pain. They prescribed pt ondansetron. She is requesting a call back.

## 2020-03-30 NOTE — PATIENT INSTRUCTIONS
PLEASE READ YOUR DISCHARGE INSTRUCTIONS ENTIRELY AS IT CONTAINS IMPORTANT INFORMATION.    Take the zofran for nausea (it dissolves under your tongue)    Use gatorade/pedialyte or rehydration packets to help stay hydrated. Vitamin water and plain water do not contain rehydrating electrolytes.  Increase clear liquids (water, gatorade, pedialyte, broths, jello, etc) Hold off on solids for 12-18 hours. Then advance to BRAT diet (banana, rice, applesauce, tea, toast/crackers), then advance further as tolerated. Avoid spicy or fatty foods.   Use Peptobismol to help alleviate your diarrhea symptoms.     Avoid imodium unless you have more than 6 loose stools in 24 hours.       Wash hands frequently while sick. Avoid ibuprofen or other NSAIDS until you are well.     Please go to the ER if you experience abdominal pain, blood in your vomit or stool, high fever, dizziness, fainting, swelling of your abdomen, inability to pass gas or stool OR ANY SEVERELY WORSENING SYMPTOMS EVEN IF YOU DO NOT GET TESTED FOR COVID.     YOU WILL QUALIFY FOR COVID TESTING IF YOU DEVELOP FEVER, SHORTNESS OF BREATH, COUGH AS YOU HAVE ACTIVE CANCER DIAGNOSIS. PLEASE GO TO [facility] AT Address: [address] [place] [id number]  Hours:   Open ? Closes 7PM  Phone: [phone] OR VISIT [facility] ANYWHERE CARE ONLINE VISIT TO BE REFERRED TO A TESTING SITE      Please arrange follow up with your primary medical clinic as soon as possible. You must understand that you've received an Urgent Care treatment only and that you may be released before all of your medical problems are known or treated. You, the patient, will arrange for follow up as instructed. If your symptoms worsen or fail to improve you should go to the Emergency Room.  WE CANNOT RULE OUT ALL POSSIBLE CAUSES OF YOUR SYMPTOMS IN THE URGENT CARE SETTING PLEASE GO TO THE ER IF YOU FEELS YOUR CONDITION IS WORSENING OR YOU WOULD LIKE EMERGENT EVALUATION.      Noninfectious  Gastroenteritis (Ages 6 Years to Adult)    Gastroenteritis can cause nausea, vomiting, diarrhea, and abdominal cramping. This may occur as a result of food sensitivity, inflammation of your gastrointestinal tract, medicines, stress, or other causes not related to infection. Your symptoms will usually last from 1 to 3 days, but can last longer. Antibiotics are not effective, but simple home treatment will be helpful.  Home care  Medicine  · You may use acetaminophen or NSAID medicines like ibuprofen or naproxen to control fever, unless another medicine is prescribed. (Note: If you have chronic liver or kidney disease, or ever had a stomach ulcer or gastrointestinalI bleeding, talk with your healthcare provider before using these medicines.) Aspirin should never be used in anyone under 18 years of age who is ill with a fever. It may cause severe liver damage. Don't increase your NSAID medicines if you are already taking these medicines for another condition (like arthritis). Don't use NSAIDS if you are on aspirin (such as for heart disease, or after a stroke).  · If medicines for diarrhea or vomiting are prescribed, take only as directed.  General care and preventing spread of the illness  · If symptoms are severe, rest at home for the next 24 hours or until you feel better.  · Hand washing with soap and water is the best way to prevent the spread of infection. Wash your hands after touching anyone who is sick.  · Wash your hands after using the toilet and before meals. Clean the toilet after each use.  · Caffeine, tobacco, and alcohol can make your diarrhea, cramping, and pain worse.  Diet  · Water and clear liquids are important so you do not get dehydrated. Drink a small amount at a time.  · Do not force yourself to eat, especially if you have cramps, vomiting, or diarrhea. When you finally decide to start eating, do not eat large amounts at a time, even if you are hungry.  · If you eat, avoid fatty, greasy, spicy,  or fried foods.  · Do not eat dairy products if you have diarrhea; they can make the diarrhea worse.  During the first 24 hours (the first full day), follow the diet below:  · Beverages: Water, clear liquids, soft drinks without caffeine, like ginger ale; mineral water (plain or flavored); decaffeinated tea and coffee.  · Soups: Clear broth, consommé, and bouillon Sports drinks aren't a good choice because they have too much sugar and not enough electrolytes. In this case, commercially available products called oral rehydration solutions are best.  · Desserts: Plain gelatin, popsicles, and fruit juice bars.  During the next 24 hours (the second day), you may add the following to the above if you have improved. If not, continue what you did the first day:  · Hot cereal, plain toast, bread, rolls, crackers  · Plain noodles, rice, mashed potatoes, chicken noodle or rice soup  · Unsweetened canned fruit (avoid pineapple), bananas  · Limit caffeine and chocolate. No spices or seasonings except salt.  During the next 24 hours  · Gradually resume a normal diet, as you feel better and your symptoms improve.  · If at any time your symptoms start getting worse, go back to clear liquids until you feel better.  Food preparation  · If you have diarrhea, you should not prepare food for others. When you  prepare food for yourself, wash your hands before and after.  · Wash your hands after using cutting boards, countertops, and knives that have been in contact with raw food.  · Keep uncooked meats away from cooked and ready-to-eat foods.  Follow-up care  Follow up with your healthcare provider if you are not improving over the next 2 to 3 days, or as advised. If a stool (diarrhea) sample was taken, call for the results as directed.  When to seek medical care  Call your healthcare provider right away if any of these occur:   · Increasing abdominal pain or constant lower right abdominal pain  · Continued vomiting (unable to keep  liquids down)  · Frequent diarrhea (more than 5 times a day)  · Blood in vomit or stool (black or red color)  · Inability to tolerate solid food after a few days.  · Dark urine, reduced urine output  · Weakness, dizziness  · Drowsiness  · Fever of 100.4ºF (38.0ºC) or higher, or as directed by your healthcare provider  · New rash  Call 911  Call 911 if any of these occur:  · Trouble breathing  · Chest pain  · Confusion  · Severe drowsiness or trouble awakening  · Seizure  · Stiff neck  Date Last Reviewed: 11/16/2015  © 4482-3618 [employer]. [address] [place] [address], [place] [address]. All rights reserved. This information is not intended as a substitute for professional medical care. Always follow your healthcare professional's instructions.

## 2020-03-30 NOTE — TELEPHONE ENCOUNTER
Spoke with pt's wife re:  visit today. Sounds like pt had food poisoning.   Put clear liquids, water & gatorade. [place] diet in the morning.   Informed Dr. Hill .

## 2020-03-31 ENCOUNTER — OFFICE VISIT (OUTPATIENT)
Dept: SURGERY | Facility: CLINIC | Age: 65
End: 2020-03-31
Payer: COMMERCIAL

## 2020-03-31 VITALS — WEIGHT: 220 LBS | BODY MASS INDEX: 30.8 KG/M2 | HEIGHT: 71 IN

## 2020-03-31 DIAGNOSIS — C73 THYROID CANCER: Primary | ICD-10-CM

## 2020-03-31 DIAGNOSIS — E04.2 MULTINODULAR GOITER: ICD-10-CM

## 2020-03-31 PROCEDURE — 99999 PR PBB SHADOW E&M-EST. PATIENT-LVL II: CPT | Mod: PBBFAC,,, | Performed by: SURGERY

## 2020-03-31 PROCEDURE — 1101F PR PT FALLS ASSESS DOC 0-1 FALLS W/OUT INJ PAST YR: ICD-10-PCS | Mod: CPTII,S$GLB,, | Performed by: SURGERY

## 2020-03-31 PROCEDURE — 99204 PR OFFICE/OUTPT VISIT, NEW, LEVL IV, 45-59 MIN: ICD-10-PCS | Mod: S$GLB,,, | Performed by: SURGERY

## 2020-03-31 PROCEDURE — 1159F MED LIST DOCD IN RCRD: CPT | Mod: S$GLB,,, | Performed by: SURGERY

## 2020-03-31 PROCEDURE — 1126F AMNT PAIN NOTED NONE PRSNT: CPT | Mod: S$GLB,,, | Performed by: SURGERY

## 2020-03-31 PROCEDURE — 1101F PT FALLS ASSESS-DOCD LE1/YR: CPT | Mod: CPTII,S$GLB,, | Performed by: SURGERY

## 2020-03-31 PROCEDURE — 99999 PR PBB SHADOW E&M-EST. PATIENT-LVL II: ICD-10-PCS | Mod: PBBFAC,,, | Performed by: SURGERY

## 2020-03-31 PROCEDURE — 99204 OFFICE O/P NEW MOD 45 MIN: CPT | Mod: S$GLB,,, | Performed by: SURGERY

## 2020-03-31 PROCEDURE — 1159F PR MEDICATION LIST DOCUMENTED IN MEDICAL RECORD: ICD-10-PCS | Mod: S$GLB,,, | Performed by: SURGERY

## 2020-03-31 PROCEDURE — 1126F PR PAIN SEVERITY QUANTIFIED, NO PAIN PRESENT: ICD-10-PCS | Mod: S$GLB,,, | Performed by: SURGERY

## 2020-03-31 NOTE — LETTER
April 1, 2020      Wiliam Winslow MD  [address] [facility] [address]           [facility]  [address]  [facility] [address]  Phone: [phone]  Fax: [phone]          Patient: Dani Gastelum Jr.   MR Number: [id number]   YOB: 1955   Date of Visit: 3/31/2020       Dear Dr. Wiliam Winslow:    Thank you for referring Dani Gastelum to me for evaluation. Attached you will find relevant portions of my assessment and plan of care.    If you have questions, please do not hesitate to call me. I look forward to following Dani Gastelum along with you.    Sincerely,    Justin Hein MD    Enclosure  CC:  No Recipients    If you would like to receive this communication electronically, please contact [email or web address] or [phone] to request more information on [employer] Link access.    For providers and/or their staff who would like to refer a patient to [facility], please contact us through our one-stop-shop provider referral line, [facility], at [phone].    If you feel you have received this communication in error or would no longer like to receive these types of communications, please e-mail [email or web address]

## 2020-04-01 PROBLEM — C73 THYROID CANCER: Status: ACTIVE | Noted: 2020-04-01

## 2020-04-03 ENCOUNTER — TELEPHONE (OUTPATIENT)
Dept: URGENT CARE | Facility: CLINIC | Age: 65
End: 2020-04-03

## 2020-07-08 ENCOUNTER — TELEPHONE (OUTPATIENT)
Dept: INTERNAL MEDICINE | Facility: CLINIC | Age: 65
End: 2020-07-08

## 2020-07-08 DIAGNOSIS — Z20.822 EXPOSURE TO COVID-19 VIRUS: Primary | ICD-10-CM

## 2020-07-08 NOTE — TELEPHONE ENCOUNTER
Has labs on 7/17 and wants covid antibody.     He feels he had covid a few months ago.  His mother was really with it and is now better.    Ok to order and add to appt?    Thanks daniel

## 2020-07-08 NOTE — TELEPHONE ENCOUNTER
----- Message from Teena Todd sent at 7/8/2020 10:01 AM CDT -----  Contact: [phone]  Patient would like to speak to the nurse in regards to his coming up labs. Please call and advise

## 2020-07-17 ENCOUNTER — LAB VISIT (OUTPATIENT)
Dept: LAB | Facility: HOSPITAL | Age: 65
End: 2020-07-17
Attending: INTERNAL MEDICINE
Payer: COMMERCIAL

## 2020-07-17 DIAGNOSIS — Z20.822 EXPOSURE TO COVID-19 VIRUS: ICD-10-CM

## 2020-07-17 DIAGNOSIS — I10 ESSENTIAL HYPERTENSION: ICD-10-CM

## 2020-07-17 DIAGNOSIS — E78.5 HYPERLIPIDEMIA, UNSPECIFIED HYPERLIPIDEMIA TYPE: ICD-10-CM

## 2020-07-17 DIAGNOSIS — C61 PROSTATE CANCER: ICD-10-CM

## 2020-07-17 LAB
ALBUMIN SERPL BCP-MCNC: 3.7 G/DL (ref 3.5–5.2)
ALP SERPL-CCNC: 42 U/L (ref 55–135)
ALT SERPL W/O P-5'-P-CCNC: 19 U/L (ref 10–44)
ANION GAP SERPL CALC-SCNC: 6 MMOL/L (ref 8–16)
AST SERPL-CCNC: 19 U/L (ref 10–40)
BASOPHILS # BLD AUTO: 0.02 K/UL (ref 0–0.2)
BASOPHILS NFR BLD: 0.4 % (ref 0–1.9)
BILIRUB SERPL-MCNC: 0.9 MG/DL (ref 0.1–1)
BUN SERPL-MCNC: 16 MG/DL (ref 8–23)
CALCIUM SERPL-MCNC: 9.1 MG/DL (ref 8.7–10.5)
CHLORIDE SERPL-SCNC: 106 MMOL/L (ref 95–110)
CHOLEST SERPL-MCNC: 152 MG/DL (ref 120–199)
CHOLEST/HDLC SERPL: 4.2 {RATIO} (ref 2–5)
CO2 SERPL-SCNC: 28 MMOL/L (ref 23–29)
COMPLEXED PSA SERPL-MCNC: 0.63 NG/ML (ref 0–4)
CREAT SERPL-MCNC: 1.2 MG/DL (ref 0.5–1.4)
DIFFERENTIAL METHOD: ABNORMAL
EOSINOPHIL # BLD AUTO: 0.1 K/UL (ref 0–0.5)
EOSINOPHIL NFR BLD: 2.5 % (ref 0–8)
ERYTHROCYTE [DISTWIDTH] IN BLOOD BY AUTOMATED COUNT: 14.2 % (ref 11.5–14.5)
EST. GFR  (AFRICAN AMERICAN): >60 ML/MIN/1.73 M^2
EST. GFR  (NON AFRICAN AMERICAN): >60 ML/MIN/1.73 M^2
GLUCOSE SERPL-MCNC: 92 MG/DL (ref 70–110)
HCT VFR BLD AUTO: 42.4 % (ref 40–54)
HDLC SERPL-MCNC: 36 MG/DL (ref 40–75)
HDLC SERPL: 23.7 % (ref 20–50)
HGB BLD-MCNC: 12.9 G/DL (ref 14–18)
IMM GRANULOCYTES # BLD AUTO: 0.01 K/UL (ref 0–0.04)
IMM GRANULOCYTES NFR BLD AUTO: 0.2 % (ref 0–0.5)
LDLC SERPL CALC-MCNC: 102.2 MG/DL (ref 63–159)
LYMPHOCYTES # BLD AUTO: 2.7 K/UL (ref 1–4.8)
LYMPHOCYTES NFR BLD: 51.7 % (ref 18–48)
MCH RBC QN AUTO: 23.9 PG (ref 27–31)
MCHC RBC AUTO-ENTMCNC: 30.4 G/DL (ref 32–36)
MCV RBC AUTO: 79 FL (ref 82–98)
MONOCYTES # BLD AUTO: 0.5 K/UL (ref 0.3–1)
MONOCYTES NFR BLD: 9.1 % (ref 4–15)
NEUTROPHILS # BLD AUTO: 1.9 K/UL (ref 1.8–7.7)
NEUTROPHILS NFR BLD: 36.1 % (ref 38–73)
NONHDLC SERPL-MCNC: 116 MG/DL
NRBC BLD-RTO: 0 /100 WBC
PLATELET # BLD AUTO: 243 K/UL (ref 150–350)
PMV BLD AUTO: 10 FL (ref 9.2–12.9)
POTASSIUM SERPL-SCNC: 4.1 MMOL/L (ref 3.5–5.1)
PROT SERPL-MCNC: 6.8 G/DL (ref 6–8.4)
RBC # BLD AUTO: 5.4 M/UL (ref 4.6–6.2)
SARS-COV-2 IGG SERPLBLD QL IA.RAPID: NEGATIVE
SODIUM SERPL-SCNC: 140 MMOL/L (ref 136–145)
TRIGL SERPL-MCNC: 69 MG/DL (ref 30–150)
WBC # BLD AUTO: 5.18 K/UL (ref 3.9–12.7)

## 2020-07-17 PROCEDURE — 85025 COMPLETE CBC W/AUTO DIFF WBC: CPT

## 2020-07-17 PROCEDURE — 36415 COLL VENOUS BLD VENIPUNCTURE: CPT | Mod: PO

## 2020-07-17 PROCEDURE — 80053 COMPREHEN METABOLIC PANEL: CPT

## 2020-07-17 PROCEDURE — 86769 SARS-COV-2 COVID-19 ANTIBODY: CPT

## 2020-07-17 PROCEDURE — 84153 ASSAY OF PSA TOTAL: CPT

## 2020-07-17 PROCEDURE — 80061 LIPID PANEL: CPT

## 2020-07-21 ENCOUNTER — TELEPHONE (OUTPATIENT)
Dept: INTERNAL MEDICINE | Facility: CLINIC | Age: 65
End: 2020-07-21

## 2020-07-21 NOTE — TELEPHONE ENCOUNTER
Has appt 8/13 to see dr alvarez, (original appt was booked out).  I will mail results. Need to call and tell him and remind of appt in august.

## 2020-07-21 NOTE — TELEPHONE ENCOUNTER
----- Message from Leah Luna sent at 7/21/2020  8:54 AM CDT -----  Regarding: Labs Results  Pt is requesting a call back regarding his lab results. He is requesting that his lab results be mailed to him or printed out for him to . Pt does not use [employer].

## 2020-07-21 NOTE — TELEPHONE ENCOUNTER
I called and told him mailing results.  Looks like all stable and  will go over at august appt.  I apologized we had to reschedule/ cielo.    Gave covid antib test/ normal . Too.

## 2020-08-05 ENCOUNTER — TELEPHONE (OUTPATIENT)
Dept: SURGERY | Facility: CLINIC | Age: 65
End: 2020-08-05

## 2020-08-05 DIAGNOSIS — C73 THYROID CANCER: Primary | ICD-10-CM

## 2020-08-05 NOTE — TELEPHONE ENCOUNTER
Contacted patient to get an update on if he wanted to schedule surgery or if he was still going with the 2nd opinion. Patient states he still had not gotten a 2nd opinion, but would like one. He stated he enjoyed Dr. Hein, but Dr. Hein mentioned taking the whole thyroid out, patient would prefer only taking out the Left side as this is the side that is cancerous. Referrals put in to Dr. Montalvo and Dr. Flores per patients request. Explained that Dr. Montalvo is only the endocrinologist, he would not perform the procedure, but would f/u with him after his procedure. Explained that Dr. Flores is a different surgeon he could get a 2nd opinion from. Informed patient that he should be receiving a call from their offices to schedule an appointment. Patient verbalized understanding.

## 2020-08-07 ENCOUNTER — TELEPHONE (OUTPATIENT)
Dept: INTERNAL MEDICINE | Facility: CLINIC | Age: 65
End: 2020-08-07

## 2020-08-07 NOTE — TELEPHONE ENCOUNTER
----- Message from Kaylen Gastelum sent at 8/7/2020  3:51 PM CDT -----  Regarding: Labs Concern  Patient wants to know if he would have to do labs again due to his appointment being changed twice already. Patient stated labs are 2 months old should he repeat labs.

## 2020-08-10 ENCOUNTER — TELEPHONE (OUTPATIENT)
Dept: ENDOCRINOLOGY | Facility: CLINIC | Age: 65
End: 2020-08-10

## 2020-08-15 ENCOUNTER — PATIENT OUTREACH (OUTPATIENT)
Dept: ADMINISTRATIVE | Facility: OTHER | Age: 65
End: 2020-08-15

## 2020-08-15 NOTE — PROGRESS NOTES
Chart reviewed.   Immunizations: Triggered Imm Registry     Orders placed: n/a  Upcoming appts to satisfy ROMERO topics: n/a

## 2020-08-17 ENCOUNTER — OFFICE VISIT (OUTPATIENT)
Dept: ENDOCRINOLOGY | Facility: CLINIC | Age: 65
End: 2020-08-17
Attending: SURGERY
Payer: COMMERCIAL

## 2020-08-17 VITALS
HEIGHT: 71 IN | SYSTOLIC BLOOD PRESSURE: 136 MMHG | BODY MASS INDEX: 29.69 KG/M2 | WEIGHT: 212.06 LBS | HEART RATE: 68 BPM | DIASTOLIC BLOOD PRESSURE: 66 MMHG

## 2020-08-17 DIAGNOSIS — C73 THYROID CANCER: ICD-10-CM

## 2020-08-17 DIAGNOSIS — C73 PAPILLARY THYROID CARCINOMA: Primary | ICD-10-CM

## 2020-08-17 PROCEDURE — 1126F AMNT PAIN NOTED NONE PRSNT: CPT | Mod: S$GLB,,, | Performed by: INTERNAL MEDICINE

## 2020-08-17 PROCEDURE — 3078F DIAST BP <80 MM HG: CPT | Mod: CPTII,S$GLB,, | Performed by: INTERNAL MEDICINE

## 2020-08-17 PROCEDURE — 99214 OFFICE O/P EST MOD 30 MIN: CPT | Mod: S$GLB,,, | Performed by: INTERNAL MEDICINE

## 2020-08-17 PROCEDURE — 3078F PR MOST RECENT DIASTOLIC BLOOD PRESSURE < 80 MM HG: ICD-10-PCS | Mod: CPTII,S$GLB,, | Performed by: INTERNAL MEDICINE

## 2020-08-17 PROCEDURE — 1101F PR PT FALLS ASSESS DOC 0-1 FALLS W/OUT INJ PAST YR: ICD-10-PCS | Mod: CPTII,S$GLB,, | Performed by: INTERNAL MEDICINE

## 2020-08-17 PROCEDURE — 3075F SYST BP GE 130 - 139MM HG: CPT | Mod: CPTII,S$GLB,, | Performed by: INTERNAL MEDICINE

## 2020-08-17 PROCEDURE — 3075F PR MOST RECENT SYSTOLIC BLOOD PRESS GE 130-139MM HG: ICD-10-PCS | Mod: CPTII,S$GLB,, | Performed by: INTERNAL MEDICINE

## 2020-08-17 PROCEDURE — 1126F PR PAIN SEVERITY QUANTIFIED, NO PAIN PRESENT: ICD-10-PCS | Mod: S$GLB,,, | Performed by: INTERNAL MEDICINE

## 2020-08-17 PROCEDURE — 3008F PR BODY MASS INDEX (BMI) DOCUMENTED: ICD-10-PCS | Mod: CPTII,S$GLB,, | Performed by: INTERNAL MEDICINE

## 2020-08-17 PROCEDURE — 1101F PT FALLS ASSESS-DOCD LE1/YR: CPT | Mod: CPTII,S$GLB,, | Performed by: INTERNAL MEDICINE

## 2020-08-17 PROCEDURE — 99214 PR OFFICE/OUTPT VISIT, EST, LEVL IV, 30-39 MIN: ICD-10-PCS | Mod: S$GLB,,, | Performed by: INTERNAL MEDICINE

## 2020-08-17 PROCEDURE — 1159F MED LIST DOCD IN RCRD: CPT | Mod: S$GLB,,, | Performed by: INTERNAL MEDICINE

## 2020-08-17 PROCEDURE — 1159F PR MEDICATION LIST DOCUMENTED IN MEDICAL RECORD: ICD-10-PCS | Mod: S$GLB,,, | Performed by: INTERNAL MEDICINE

## 2020-08-17 PROCEDURE — 3008F BODY MASS INDEX DOCD: CPT | Mod: CPTII,S$GLB,, | Performed by: INTERNAL MEDICINE

## 2020-08-17 NOTE — LETTER
August 17, 2020      Justin Hein MD  [address]  [address]  [facility]  [facility] [address]           [facility] [address]  [address], [address]  [facility] [address]  Phone: [phone]  Fax: [phone]          Patient: Dani Gastelum Jr.   MR Number: [id number]   YOB: 1955   Date of Visit: 8/17/2020       Dear Dr. Justin Hein:    Thank you for referring Dani Gastelum to me for evaluation. Attached you will find relevant portions of my assessment and plan of care.    If you have questions, please do not hesitate to call me. I look forward to following Dani Gastelum along with you.    Sincerely,    Lj Montalvo MD    Enclosure  CC:  No Recipients    If you would like to receive this communication electronically, please contact [email or web address] or [phone] to request more information on [employer] Link access.    For providers and/or their staff who would like to refer a patient to [facility], please contact us through our one-stop-shop provider referral line, [facility], at [phone].    If you feel you have received this communication in error or would no longer like to receive these types of communications, please e-mail [email or web address]

## 2020-08-17 NOTE — PROGRESS NOTES
"Subjective:      Patient ID: Dani Gastelum Jr. is a 64 y.o. male.    Chief Complaint:  Papillary Thyroid CA evaluation       History of Present Illness  63 YO Male w/ diagnosis of PTC, HTN, HLD and Beta thalasemia that presents to the endocrine clinic for evaluation of PTC management. Pt today reports feels well and wishes a second opinion on treatment options for PTC.  Pt wishes to explore the options of partial vs total thyroidectomy.  Pt denies complaints of neck swelling, fatigue, weight loss or night sweats.     In regards to thyroid CA:    -TSH   0.8 WNL  -FT4 1.07 WNL   -Thyroid US 12/19:  MNG w/ largest nodule on Left thyroid meassuring 1.7 x 1.1 x 1.7 cm, (7) hypoechoic solid-appearing w/ blurred margins and a 1.5cm complex predominantly solid left thyroid nodule. The right thyroid nodules are sub-centimeter   -FNA  On 3/20 of Left inferior 1.7cm thyroid nodule:  Positive for Papillary thyroid Carcinoma   -No Lymph node evaluation on Thyroid US done on 12/19   -Compressive symptoms: DENIES   -Clinically and chemically Euthyroid   -History of radiation: DENIES   -Family history of thyroid CA: DENIES     Review of Systems   Constitutional: Negative for chills and fatigue.   HENT: Negative for rhinorrhea and sore throat.    Eyes: Negative for discharge and visual disturbance.   Respiratory: Negative for shortness of breath and wheezing.    Cardiovascular: Negative for chest pain and leg swelling.   Gastrointestinal: Negative for abdominal distention and abdominal pain.   Endocrine: Negative for cold intolerance and polydipsia.   Musculoskeletal: Negative for arthralgias and myalgias.   Skin: Negative for rash and wound.   Neurological: Negative for dizziness, weakness and light-headedness.   Psychiatric/Behavioral: Negative for agitation and sleep disturbance.       Objective:     /66   Pulse 68   Ht 5' 11" (1.803 m)   Wt 96.2 kg (212 lb 1.3 oz)   BMI 29.58 kg/m²   BP Readings from Last 3 Encounters: "   08/17/20 136/66   03/30/20 (!) 156/80   03/04/20 (!) 140/77     Wt Readings from Last 1 Encounters:   08/17/20 1353 96.2 kg (212 lb 1.3 oz)     Body mass index is 29.58 kg/m².      Physical Exam  Vitals signs reviewed.   Constitutional:       Appearance: He is well-developed.   HENT:      Right Ear: External ear normal.      Left Ear: External ear normal.      Nose: Nose normal.   Neck:      Thyroid: No thyromegaly.      Trachea: No tracheal deviation.      Comments: Thyromegaly ++   Cardiovascular:      Rate and Rhythm: Normal rate.      Heart sounds: No murmur.   Pulmonary:      Effort: Pulmonary effort is normal.      Breath sounds: Normal breath sounds.   Abdominal:      Palpations: Abdomen is soft. There is no mass.      Hernia: No hernia is present.   Skin:     Findings: No rash.   Neurological:      Mental Status: He is alert.      Cranial Nerves: No cranial nerve deficit.      Sensory: No sensory deficit.      Coordination: Coordination normal.   Psychiatric:         Judgment: Judgment normal.                Lab Review:   No results found for: HGBA1C  Lab Results   Component Value Date    CHOL 152 07/17/2020    HDL 36 (L) 07/17/2020    LDLCALC 102.2 07/17/2020    TRIG 69 07/17/2020    CHOLHDL 23.7 07/17/2020     Lab Results   Component Value Date     07/17/2020    K 4.1 07/17/2020     07/17/2020    CO2 28 07/17/2020    GLU 92 07/17/2020    BUN 16 07/17/2020    CREATININE 1.2 07/17/2020    CALCIUM 9.1 07/17/2020    PROT 6.8 07/17/2020    ALBUMIN 3.7 07/17/2020    BILITOT 0.9 07/17/2020    ALKPHOS 42 (L) 07/17/2020    AST 19 07/17/2020    ALT 19 07/17/2020    ANIONGAP 6 (L) 07/17/2020    ESTGFRAFRICA >60.0 07/17/2020    EGFRNONAA >60.0 07/17/2020    TSH 0.870 01/30/2020     Vit D, 25-Hydroxy   Date Value Ref Range Status   03/24/2015 51 30 - 96 ng/mL Final     Comment:     Vitamin D deficiency.........<10 ng/mL                              Vitamin D insufficiency......10-29 ng/mL       Vitamin D  sufficiency........> or equal to 30 ng/mL  Vitamin D toxicity............>100 ng/mL         Assessment and Plan     Papillary thyroid carcinoma  -TSH   0.8 WNL  -FT4 1.07 WNL   -Thyroid US 12/19:  MNG w/ largest nodule on Left thyroid meassuring 1.7 x 1.1 x 1.7 cm, (7) hypoechoic solid-appearing w/ blurred margins and a 1.5cm complex predominantly solid left thyroid nodule. The right thyroid nodules are sub-centimeter   -FNA  On 3/20 of Left inferior 1.7cm thyroid nodule:  Positive for Papillary thyroid Carcinoma   -No Lymph node evaluation on Thyroid US done on 12/19   -Clinically and chemically Euthyroid   -History of radiation: DENIES   -Family history of thyroid CA: DENIES     Plan:   -Due to patient wanting to explore options for Total vs partial thyroidectomy will do the following recommendations.     -Send for Neck US to evaluate for Suspicious LAD (Lymph node mapping)    -If patient has B/L suspicious LAD, will recommend Total thyroidectomy at that time     -If only left sided LAD, could consider Left lobectomy based on patient preference at that time     -Discussed treatment options with patient, pros and cons of partial vs total thyroidectomy.     -Pt is aware that if he chooses for a partial thyroidectomy in views of unilateral disease, he would still need to do yearly Thyroid US for monitoring and FNA if required. Also discuss the possibility of needing going back to the OR for a completion thyroidectomy if pathology determines is a CASI high risk cancer     -Questions and concerns addressed. Pt express understanding.      -Will follow US report and will discuss with patient next steps to follow.

## 2020-08-17 NOTE — PATIENT INSTRUCTIONS
We will schedule a thyroid and neck Ultrasound to evaluate if there is any abnormal lymph nodes on your right side of your neck.     I will call you with results and to discuss surgical treatment options,  Either removing the left lobe or the total thyroid removed.     I will call you with the results of the thryroid US.

## 2020-08-17 NOTE — PROGRESS NOTES
I have reviewed and concur with the fellow's history, physical, assessment, and plan.  I have personally interviewed the patient and all questions were answered.     Reviewed pro and con of total thyroidectomy vs lobectomy for thyroid cancer. Will need to get ultrasound to examine lateral neck lymph nodes to inform decision. He is leaning towards a total thyroidectomy at this point.

## 2020-08-17 NOTE — ASSESSMENT & PLAN NOTE
-TSH   0.8 WNL  -FT4 1.07 WNL   -Thyroid US 12/19:  MNG w/ largest nodule on Left thyroid meassuring 1.7 x 1.1 x 1.7 cm, (7) hypoechoic solid-appearing w/ blurred margins and a 1.5cm complex predominantly solid left thyroid nodule. The right thyroid nodules are sub-centimeter   -FNA  On 3/20 of Left inferior 1.7cm thyroid nodule:  Positive for Papillary thyroid Carcinoma   -No Lymph node evaluation on Thyroid US done on 12/19   -Clinically and chemically Euthyroid   -History of radiation: DENIES   -Family history of thyroid CA: DENIES     Plan:   -Due to patient wanting to explore options for Total vs partial thyroidectomy will do the following recommendations.     -Send for Neck US to evaluate for Suspicious LAD (Lymph node mapping)    -If patient has B/L suspicious LAD, will recommend Total thyroidectomy at that time     -If only left sided LAD, could consider Left lobectomy based on patient preference at that time     -Discussed treatment options with patient, pros and cons of partial vs total thyroidectomy.     -Pt is aware that if he chooses for a partial thyroidectomy in views of unilateral disease, he would still need to do yearly Thyroid US for monitoring and FNA if required. Also discuss the possibility of needing going back to the OR for a completion thyroidectomy if pathology determines is a CASI high risk cancer     -Questions and concerns addressed. Pt express understanding.      -Will follow US report and will discuss with patient next steps to follow.

## 2020-09-01 ENCOUNTER — HOSPITAL ENCOUNTER (OUTPATIENT)
Dept: ENDOCRINOLOGY | Facility: CLINIC | Age: 65
Discharge: HOME | End: 2020-09-01
Attending: GENERAL ACUTE CARE HOSPITAL
Payer: COMMERCIAL

## 2020-09-01 DIAGNOSIS — C73 THYROID CANCER: ICD-10-CM

## 2020-09-01 PROCEDURE — 76536 US EXAM OF HEAD AND NECK: CPT | Mod: S$GLB,,, | Performed by: INTERNAL MEDICINE

## 2020-09-01 PROCEDURE — 76536 US SOFT TISSUE HEAD NECK THYROID: ICD-10-PCS | Mod: S$GLB,,, | Performed by: INTERNAL MEDICINE

## 2020-09-04 ENCOUNTER — PATIENT MESSAGE (OUTPATIENT)
Dept: ENDOCRINOLOGY | Facility: CLINIC | Age: 65
End: 2020-09-04

## 2020-09-04 DIAGNOSIS — C73 PAPILLARY THYROID CARCINOMA: Primary | ICD-10-CM

## 2020-09-04 NOTE — TELEPHONE ENCOUNTER
Pt w/ PTC on Left 1.7cm nodule FNA seeking second opinion for possible left lobectomy instead of total thyroidectomy. Decision was going to be determined based on neck Lymph node mapping and discussion with endo and surgery.      Imaging:   -Neck US 9.1.20:    No suspicious LAD, MNG w/ Inferior left lobe nodule has enlarged compared to previous ultrasound.  Stable right sided nodules.         Plan:   -From endocrine point we would be ok if patient chooses for lobectomy.     -Pt is aware that if he chooses for a partial thyroidectomy in views of unilateral disease, he would still need to do yearly Thyroid US for monitoring and FNA if required. Also discuss the possibility of needing going back to the OR for a completion thyroidectomy if pathology determines is a CASI high risk cancer      -Questions and concerns addressed on previous clinic visit. Pt express understanding.      -Will give endocrine surgery referral

## 2020-09-08 ENCOUNTER — TELEPHONE (OUTPATIENT)
Dept: SURGERY | Facility: CLINIC | Age: 65
End: 2020-09-08

## 2020-09-08 NOTE — TELEPHONE ENCOUNTER
Left message on patient's voicemail for him to call for an appointment with Dr. Lobato.  Direct phone number given for him to call back.

## 2020-09-08 NOTE — TELEPHONE ENCOUNTER
----- Message from May Stone MA sent at 9/8/2020 12:00 PM CDT -----  Good morning     Please contact pt to schedule surgery consult. Referral in system. Thank you      Have a great day

## 2020-09-11 ENCOUNTER — TELEPHONE (OUTPATIENT)
Dept: ENDOCRINOLOGY | Facility: CLINIC | Age: 65
End: 2020-09-11

## 2020-09-11 NOTE — TELEPHONE ENCOUNTER
Spoke to the patient and he wants Dr. Winslow advice on another provider in regards to his thyroid issue. I informed him she is out of the office, but I have sent her the message and once she responds I will call him with the details. Patient has voiced understanding.

## 2020-09-11 NOTE — TELEPHONE ENCOUNTER
----- Message from Gayla Mosley sent at 9/10/2020  4:00 PM CDT -----  Contact: pt, [phone]  Pt requests to speak with you, states Dr. Hein who you referred him to see for his thyroid surgery is leaving to go to [facility] and would like your opinion on the other providers. Please advise.

## 2020-09-14 ENCOUNTER — TELEPHONE (OUTPATIENT)
Dept: ENDOCRINOLOGY | Facility: CLINIC | Age: 65
End: 2020-09-14

## 2020-09-14 NOTE — TELEPHONE ENCOUNTER
----- Message from Wiliam Winslow MD sent at 9/14/2020  9:31 AM CDT -----  Contact: pt, [phone]  From the chart review, he was seen at the main campus and they are trying to schedule him with surgeon . Please inform him about it  ----- Message -----  From: Rachel Benito MA  Sent: 9/11/2020   8:39 AM CDT  To: Wiliam Winslow MD    I called the patient and he wants to know if you can give him some advice about another provider to assist with his thyroid surgery.  ----- Message -----  From: Gayla Mosley  Sent: 9/10/2020   4:00 PM CDT  To: [facility] Staff    Pt requests to speak with you, states Dr. Hein who you referred him to see for his thyroid surgery is leaving to go to [facility] and would like your opinion on the other providers. Please advise.

## 2020-09-14 NOTE — TELEPHONE ENCOUNTER
I called the patient to inform him of the message, but there was no answer. I left a message for him to reach out to the main campus for further information.

## 2020-09-14 NOTE — TELEPHONE ENCOUNTER
----- Message from Minoo Jauregui sent at 9/14/2020 10:36 AM CDT -----  Contact: [phone]/Self  Type:  Patient Returning Call    Who Called:Pt  Who Left Message for Patient:Rachel   Does the patient know what this is regarding?:provider recommendation   Would the patient rather a call back or a response via [facility]? Call back   Best Call Back Number:[phone]  Additional Information:

## 2020-09-24 ENCOUNTER — TELEPHONE (OUTPATIENT)
Dept: INTERNAL MEDICINE | Facility: CLINIC | Age: 65
End: 2020-09-24

## 2020-09-24 ENCOUNTER — OFFICE VISIT (OUTPATIENT)
Dept: INTERNAL MEDICINE | Facility: CLINIC | Age: 65
End: 2020-09-24
Payer: COMMERCIAL

## 2020-09-24 VITALS
WEIGHT: 213.19 LBS | TEMPERATURE: 98 F | SYSTOLIC BLOOD PRESSURE: 138 MMHG | BODY MASS INDEX: 29.85 KG/M2 | HEIGHT: 71 IN | HEART RATE: 60 BPM | OXYGEN SATURATION: 98 % | RESPIRATION RATE: 20 BRPM | DIASTOLIC BLOOD PRESSURE: 82 MMHG

## 2020-09-24 DIAGNOSIS — E78.5 HYPERLIPIDEMIA, UNSPECIFIED HYPERLIPIDEMIA TYPE: ICD-10-CM

## 2020-09-24 DIAGNOSIS — I10 ESSENTIAL HYPERTENSION: ICD-10-CM

## 2020-09-24 DIAGNOSIS — I10 ESSENTIAL HYPERTENSION: Primary | ICD-10-CM

## 2020-09-24 DIAGNOSIS — G89.29 GROIN PAIN, CHRONIC, RIGHT: Primary | ICD-10-CM

## 2020-09-24 DIAGNOSIS — C73 THYROID CANCER: ICD-10-CM

## 2020-09-24 DIAGNOSIS — R10.31 GROIN PAIN, CHRONIC, RIGHT: Primary | ICD-10-CM

## 2020-09-24 PROCEDURE — 3075F PR MOST RECENT SYSTOLIC BLOOD PRESS GE 130-139MM HG: ICD-10-PCS | Mod: CPTII,S$GLB,, | Performed by: INTERNAL MEDICINE

## 2020-09-24 PROCEDURE — 90662 IIV NO PRSV INCREASED AG IM: CPT | Mod: S$GLB,,, | Performed by: INTERNAL MEDICINE

## 2020-09-24 PROCEDURE — 1101F PR PT FALLS ASSESS DOC 0-1 FALLS W/OUT INJ PAST YR: ICD-10-PCS | Mod: CPTII,S$GLB,, | Performed by: INTERNAL MEDICINE

## 2020-09-24 PROCEDURE — 1125F AMNT PAIN NOTED PAIN PRSNT: CPT | Mod: S$GLB,,, | Performed by: INTERNAL MEDICINE

## 2020-09-24 PROCEDURE — 3079F DIAST BP 80-89 MM HG: CPT | Mod: CPTII,S$GLB,, | Performed by: INTERNAL MEDICINE

## 2020-09-24 PROCEDURE — 99214 PR OFFICE/OUTPT VISIT, EST, LEVL IV, 30-39 MIN: ICD-10-PCS | Mod: 25,S$GLB,, | Performed by: INTERNAL MEDICINE

## 2020-09-24 PROCEDURE — 3008F BODY MASS INDEX DOCD: CPT | Mod: CPTII,S$GLB,, | Performed by: INTERNAL MEDICINE

## 2020-09-24 PROCEDURE — 99999 PR PBB SHADOW E&M-EST. PATIENT-LVL IV: CPT | Mod: PBBFAC,,, | Performed by: INTERNAL MEDICINE

## 2020-09-24 PROCEDURE — 1159F MED LIST DOCD IN RCRD: CPT | Mod: S$GLB,,, | Performed by: INTERNAL MEDICINE

## 2020-09-24 PROCEDURE — 99999 PR PBB SHADOW E&M-EST. PATIENT-LVL IV: ICD-10-PCS | Mod: PBBFAC,,, | Performed by: INTERNAL MEDICINE

## 2020-09-24 PROCEDURE — 3008F PR BODY MASS INDEX (BMI) DOCUMENTED: ICD-10-PCS | Mod: CPTII,S$GLB,, | Performed by: INTERNAL MEDICINE

## 2020-09-24 PROCEDURE — 3075F SYST BP GE 130 - 139MM HG: CPT | Mod: CPTII,S$GLB,, | Performed by: INTERNAL MEDICINE

## 2020-09-24 PROCEDURE — 3079F PR MOST RECENT DIASTOLIC BLOOD PRESSURE 80-89 MM HG: ICD-10-PCS | Mod: CPTII,S$GLB,, | Performed by: INTERNAL MEDICINE

## 2020-09-24 PROCEDURE — 90471 IMMUNIZATION ADMIN: CPT | Mod: S$GLB,,, | Performed by: INTERNAL MEDICINE

## 2020-09-24 PROCEDURE — 99214 OFFICE O/P EST MOD 30 MIN: CPT | Mod: 25,S$GLB,, | Performed by: INTERNAL MEDICINE

## 2020-09-24 PROCEDURE — 90662 FLU VACCINE - HIGH DOSE (65+) PRESERVATIVE FREE IM: ICD-10-PCS | Mod: S$GLB,,, | Performed by: INTERNAL MEDICINE

## 2020-09-24 PROCEDURE — 1101F PT FALLS ASSESS-DOCD LE1/YR: CPT | Mod: CPTII,S$GLB,, | Performed by: INTERNAL MEDICINE

## 2020-09-24 PROCEDURE — 90471 FLU VACCINE - HIGH DOSE (65+) PRESERVATIVE FREE IM: ICD-10-PCS | Mod: S$GLB,,, | Performed by: INTERNAL MEDICINE

## 2020-09-24 PROCEDURE — 1125F PR PAIN SEVERITY QUANTIFIED, PAIN PRESENT: ICD-10-PCS | Mod: S$GLB,,, | Performed by: INTERNAL MEDICINE

## 2020-09-24 PROCEDURE — 1159F PR MEDICATION LIST DOCUMENTED IN MEDICAL RECORD: ICD-10-PCS | Mod: S$GLB,,, | Performed by: INTERNAL MEDICINE

## 2020-09-24 NOTE — TELEPHONE ENCOUNTER
----- Message from Leah Luna sent at 2020  2:24 PM CDT -----  Regardin mos f/u and labs  6 mos f/u davis on 2021 and labs on 2021

## 2020-09-25 ENCOUNTER — PATIENT OUTREACH (OUTPATIENT)
Dept: ADMINISTRATIVE | Facility: OTHER | Age: 65
End: 2020-09-25

## 2020-09-25 NOTE — PROGRESS NOTES
Health Maintenance Due   Topic Date Due    Shingles Vaccine (2 of 3) 11/08/2017    Pneumococcal Vaccine (65+ High/Highest Risk) (2 of 2 - PPSV23) 05/10/2019     Updates were requested from care everywhere.  Chart was reviewed for overdue Proactive [facility] Encounters ([facility]) topics (CRS, Breast Cancer Screening, Eye exam)  Health Maintenance has been updated.  [facility] immunization registry triggered.  Immunizations were reconciled.

## 2020-09-25 NOTE — PROGRESS NOTES
Subjective:       Patient ID: Dani Gastelum Jr. is a 64 y.o. male.    Chief Complaint: 6mo follow up and Groin Pain (inner thigh to groin, off/on several months)    HPI   The patient presents for follow-up of medical conditions.  Active medical conditions include hypertension, hyperlipidemia chronic rhinitis.  Patient also has osteoarthritis.  He has been diagnosed to have papillary thyroid carcinoma primarily involving the left lobe of the thyroid gland.  He is scheduled to see an endocrine surgeon for a 2nd surgical opinion.    Patient reports his diet is stable however his exercise level has decreased due to the COVID-19 pandemic.  He has been limited to riding his bike.  He does have chronic knee joint pain but denies having any stiffness.  A main concern he has is recurrent right inguinal pain.  Pain is primarily localized to the proximal right thigh.  He has had recurring discomfort in this area for over year.  Review of the patient's medical record shows that he complained pain in this area for the past 2-3 years.  He denies experiencing any acute strain from lifting or bending.  He has not performed any unusual climbing maneuvers.    Past surgeries have included TURP, right knee meniscectomy, right shoulder surgery, nasal septum surgery, and vasectomy.  There is no history of anesthesia allergies or serious complications.  His exercise tolerance has remained good.  He does not have any difficulty climbing stairs or walking briskly.    Review of Systems   Constitutional: Positive for activity change. Negative for appetite change, fatigue and unexpected weight change.   HENT: Negative for sinus pressure/congestion and sore throat.    Eyes: Negative for visual disturbance.   Respiratory: Negative for cough, chest tightness, shortness of breath and wheezing.    Cardiovascular: Negative for chest pain, palpitations and leg swelling.   Gastrointestinal: Negative for abdominal pain, blood in stool, nausea and  vomiting.   Genitourinary: Negative for dysuria, flank pain, hematuria, scrotal swelling, testicular pain and urgency.   Musculoskeletal: Positive for arthralgias. Negative for back pain, gait problem, joint swelling, myalgias and neck stiffness.   Integumentary:  Negative for color change and rash.   Neurological: Negative for dizziness, syncope, weakness, light-headedness, numbness and headaches.   Psychiatric/Behavioral: Negative for sleep disturbance.         Objective:      Physical Exam  Vitals signs and nursing note reviewed.   Constitutional:       General: He is not in acute distress.     Appearance: Normal appearance. He is well-developed.      Comments: The patient has lost 6 lb.  since 1/24/2020.   HENT:      Head: Normocephalic and atraumatic.   Eyes:      General: No scleral icterus.     Conjunctiva/sclera: Conjunctivae normal.   Neck:      Musculoskeletal: Normal range of motion and neck supple.      Thyroid: No thyromegaly.      Vascular: No JVD.   Cardiovascular:      Rate and Rhythm: Normal rate and regular rhythm.      Heart sounds: Normal heart sounds. No murmur. No friction rub. No gallop.    Pulmonary:      Effort: Pulmonary effort is normal. No respiratory distress.      Breath sounds: Normal breath sounds. No wheezing or rales.   Abdominal:      General: Bowel sounds are normal.      Palpations: Abdomen is soft. There is no mass.      Tenderness: There is no abdominal tenderness.   Musculoskeletal:      Right lower leg: No edema.      Left lower leg: No edema.      Comments: Negative straight leg raising test bilaterally.  Hip range of motion is intact.  There is no right inguinal adenopathy.  Femoral pulses are intact bilaterally.  No inguinal adenopathy is appreciated.  Tenderness is noted in the right proximal thigh on deep palpation but no discrete mass is appreciated; overlying skin is cool to the touch.  There is no obvious induration or increased warmth.   Lymphadenopathy:       Cervical: No cervical adenopathy.   Skin:     General: Skin is warm and dry.      Findings: No rash.   Neurological:      General: No focal deficit present.      Mental Status: He is alert and oriented to person, place, and time.      Motor: No weakness.      Gait: Gait normal.      Comments: Gait is normal.   Psychiatric:         Mood and Affect: Mood normal.         Behavior: Behavior normal.         Assessment:       1. Groin pain, chronic, right    2. Essential hypertension    3. Hyperlipidemia, unspecified hyperlipidemia type    4. Thyroid cancer        Plan:       Dani was seen today for 6mo follow up and groin pain.  An MRI scan of the right thigh (femur) will be obtained for further evaluation of chronic recurrent groin pain involving the proximal right thigh..  Current medications will be continued.  Influenza vaccine will be administered today.  Blood tests and follow-up visit in 6 months will be obtained.     A preop EKG will be ordered.  Patient is otherwise medically cleared for thyroid surgery as recommended.    Diagnoses and all orders for this visit:    Groin pain, chronic, right  -     MRI Femur Without Contrast Right; Future    Essential hypertension    Hyperlipidemia, unspecified hyperlipidemia type    Thyroid cancer    Other orders  -     Influenza - High Dose (65+) (PF) (IM)      ADDENDUM:    EKG 10/01/2020 was reviewed.  Normal sinus  with ventricular rate of 70.  Nonspecific T-wave abnormality.    The patient is medically cleared for surgery as planned.

## 2020-09-28 ENCOUNTER — INITIAL CONSULT (OUTPATIENT)
Dept: SURGERY | Facility: CLINIC | Age: 65
End: 2020-09-28
Attending: SURGERY
Payer: COMMERCIAL

## 2020-09-28 VITALS
SYSTOLIC BLOOD PRESSURE: 139 MMHG | BODY MASS INDEX: 29.82 KG/M2 | HEIGHT: 71 IN | DIASTOLIC BLOOD PRESSURE: 76 MMHG | WEIGHT: 213 LBS | HEART RATE: 65 BPM

## 2020-09-28 DIAGNOSIS — C73 THYROID CANCER: Primary | ICD-10-CM

## 2020-09-28 PROCEDURE — 99214 OFFICE O/P EST MOD 30 MIN: CPT | Mod: S$GLB,,, | Performed by: SURGERY

## 2020-09-28 PROCEDURE — 1126F AMNT PAIN NOTED NONE PRSNT: CPT | Mod: S$GLB,,, | Performed by: SURGERY

## 2020-09-28 PROCEDURE — 1159F PR MEDICATION LIST DOCUMENTED IN MEDICAL RECORD: ICD-10-PCS | Mod: S$GLB,,, | Performed by: SURGERY

## 2020-09-28 PROCEDURE — 99214 PR OFFICE/OUTPT VISIT, EST, LEVL IV, 30-39 MIN: ICD-10-PCS | Mod: S$GLB,,, | Performed by: SURGERY

## 2020-09-28 PROCEDURE — 1126F PR PAIN SEVERITY QUANTIFIED, NO PAIN PRESENT: ICD-10-PCS | Mod: S$GLB,,, | Performed by: SURGERY

## 2020-09-28 PROCEDURE — 1101F PT FALLS ASSESS-DOCD LE1/YR: CPT | Mod: CPTII,S$GLB,, | Performed by: SURGERY

## 2020-09-28 PROCEDURE — 3078F DIAST BP <80 MM HG: CPT | Mod: CPTII,S$GLB,, | Performed by: SURGERY

## 2020-09-28 PROCEDURE — 3075F SYST BP GE 130 - 139MM HG: CPT | Mod: CPTII,S$GLB,, | Performed by: SURGERY

## 2020-09-28 PROCEDURE — 3008F PR BODY MASS INDEX (BMI) DOCUMENTED: ICD-10-PCS | Mod: CPTII,S$GLB,, | Performed by: SURGERY

## 2020-09-28 PROCEDURE — 1101F PR PT FALLS ASSESS DOC 0-1 FALLS W/OUT INJ PAST YR: ICD-10-PCS | Mod: CPTII,S$GLB,, | Performed by: SURGERY

## 2020-09-28 PROCEDURE — 3075F PR MOST RECENT SYSTOLIC BLOOD PRESS GE 130-139MM HG: ICD-10-PCS | Mod: CPTII,S$GLB,, | Performed by: SURGERY

## 2020-09-28 PROCEDURE — 1159F MED LIST DOCD IN RCRD: CPT | Mod: S$GLB,,, | Performed by: SURGERY

## 2020-09-28 PROCEDURE — 3078F PR MOST RECENT DIASTOLIC BLOOD PRESSURE < 80 MM HG: ICD-10-PCS | Mod: CPTII,S$GLB,, | Performed by: SURGERY

## 2020-09-28 PROCEDURE — 3008F BODY MASS INDEX DOCD: CPT | Mod: CPTII,S$GLB,, | Performed by: SURGERY

## 2020-09-28 NOTE — PROGRESS NOTES
Consult Note  Endocrine Surgery    Visit Diagnosis: Thyroid cancer [C73]    SUBJECTIVE:     Dani Gastelum Jr. is a 64 y.o. male seen at the request of Dr. Augie Xiao and Dr. Montalvo today in the [facility] Surgery [facility] for evaluation of thyroid carcinoma. His history dates back to 12/2019 when patient was found to have thyroid nodule(incidental) on imaging of neck(MRI cervical spine). Subsequent evaluation included referral to an endocrinologist, neck ultrasound and fine needle aspiration.  At that point patient was diagnosed with papillary thyroid cancer.  He has seen Dr. Hein in total thyroidectomy was recommended.  Patient sought 2nd opinion as he desires thyroid lobectomy if at all possible.    Dani Gastelum Jr. complains of no new symptoms. The patient denies hot/cold intolerance, fatigue, unexplained weight changes, difficulty with swallowing, difficulty with shortness of breath especially with postural changes, change in voice quality and growth of thyroid nodule over time. He does not have a history of head and neck radiation therapy. He does not have a family history of thyroid disease. He does not have a family history of endocrinopathies. He is not taking thyroid hormone supplementation. He has not undergone radioactive iodine treatment.      Review of patient's allergies indicates:  No Known Allergies    Current Outpatient Medications   Medication Sig Dispense Refill    aspirin 81 MG Chew Take 81 mg by mouth once daily.      co-enzyme Q-10 30 mg capsule Take 30 mg by mouth once daily.      fish oil-omega-3 fatty acids 300-1,000 mg capsule Take 2 g by mouth once daily.      fluticasone (FLONASE) 50 mcg/actuation nasal spray SPRAY TWICE IN NOSTRIL(S) DAILY AS NEEDED FOR ALLERGIC RHINITIS 1 Bottle 6    FOLIC ACID/MV,FE,OTHER MIN (CENTRUM ORAL) Take by mouth once daily.       glucosamine-chondroitin 500-400 mg tablet Take 1 tablet by mouth 3 (three) times daily. Pt taking one pill once a day.       latanoprost 0.005 % ophthalmic solution INT 1 GTT IN OU QD HS  3    losartan (COZAAR) 50 MG tablet TAKE 1 TABLET BY MOUTH EVERY DAY 30 tablet 6    pravastatin (PRAVACHOL) 40 MG tablet TAKE 1 TABLET BY MOUTH EVERY DAY 30 tablet 11    hydrocodone-acetaminophen 7.5-325mg (NORCO) 7.5-325 mg per tablet TK ONE T PO Q 4 TO 6 H PRF PAIN  0    ondansetron (ZOFRAN-ODT) 4 MG TbDL Take 1 tablet (4 mg total) by mouth every 6 (six) hours as needed. 30 tablet 0    pravastatin (PRAVACHOL) 40 MG tablet TAKE ONE TABLET BY MOUTH EVERY DAY 30 tablet 6    VOLTAREN 1 % Gel        No current facility-administered medications for this visit.        Past Medical History:   Diagnosis Date    ALLERGIC RHINITIS     Allergy     BPH (benign prostatic hypertrophy)     Hyperlipidemia     Hypertension     Thalassemia, beta     Thyroid cancer     Vertigo, intermittent      Past Surgical History:   Procedure Laterality Date    KNEE SURGERY Right     NASAL SEPTUM SURGERY      SHOULDER SURGERY Right     right rotator cuff    TRANSURETHRAL RESECTION OF PROSTATE      VASECTOMY       Social History     Tobacco Use    Smoking status: Never Smoker    Smokeless tobacco: Never Used   Substance Use Topics    Alcohol use: No    Drug use: No          Review of Systems:    Review of Systems   Constitutional: negative for chills, fatigue, fevers, malaise and night sweats  Eyes: negative for icterus and irritation  Respiratory: negative for cough and dyspnea on exertion  Cardiovascular: negative for chest pain and palpitations  Gastrointestinal: negative for constipation, diarrhea and dysphagia  Genitourinary:negative for dysuria, hematuria and nocturia  Integument/breast: negative for rash and skin color change  Hematologic/lymphatic: negative for bleeding and easy bruising  Neurological: negative for gait problems, headaches and seizures  Behavioral/Psych: negative for anxiety and depression  Endocrine: negative    ENT ROS:  "negative  Endocrine ROS:   negative for - hair pattern changes, malaise/lethargy, mood swings, palpitations or polydipsia/polyuria        OBJECTIVE:     Vital Signs:  /76   Pulse 65   Ht 5' 11" (1.803 m)   Wt 96.6 kg (213 lb)   BMI 29.71 kg/m²    Body mass index is 29.71 kg/m².      Physical Exam:    General:  no distress, see vitals for BMI    Eyes:  conjunctivae/corneas clear   Neck: trachea midline and symmetric, no adenopathy    Thyroid:  thyroid enlarged(mildly)   Lung: clear to auscultation bilaterally   Heart:  regular rate and rhythm   Abdomen: soft, non-tender; bowel sounds normal; no masses,  no organomegaly   Skin/Extremities: warm and well-perfused   Pulses: 2+ and symmetric   Neuro: normal without focal findings and mental status, speech normal, alert and oriented x3       Laboratory/Radiologic Studies    Studies:  Date:       Results:    Ultrasound:  9/1/2020 FINDINGS:  Multinodular enlarged thyroid        RIGHT LOBE:  Measures 6.24 x 2.14 x2.42 cm.     1.  Superior nodule measures 1.13 x 0.7 x 0.93 cm.  Isoechoic.  Microcalcifications.  2. Mid lobe cyst measures 1.32 x 1.02 x 1.33 cm.        LEFT LOBE:  Measures 6.21 x 2.47 x 2.57 cm.     1.  Superior nodule measures x 1.57 x 1.2 x 1.53 Cm.  Hypoechoic.  Grade 3 vascularity Wide>tall.  2.  Mid lobe nodule measures 1.37 x 0.92 x 1.19 cm.  3.  Inferior nodule measures 1.96 x 1.46 x 1.77 cm.  Hypoechoic.  Grade 2 vascularity.  Irregular borders.  4.  Inferior nodule measures 0.99 x 1 time 0.75 cm.  5.  Superior nodule measures 0.85 x 0.74 inferior nodule x 0.53 cm.     .        ISTHMUS:  Measures 0.25 cm.        LYMPH NODES:  No abnormal lymph nodes seen.     COMPARISON:  1/6/2020.     Impression:     Multinodular goiter with left lobe papillary carcinoma.  No pathologic lymph nodes identified.  Inferior left lobe nodule has enlarged compared to previous ultrasound   Pathology::  3/3/2020 THYROID, LEFT INFERIOR LOBE, FINE NEEDLE ASPIRATION:  - " [facility] Thyroid Cytology Category: Malignant  - Papillary thyroid carcinoma    THYROID, LEFT SUPERIOR LOBE, FINE NEEDLE ASPIRATION:  - [facility] Thyroid Cytology Category: Benign  - Abundant colloid and benign follicular cells present         Component Value Date    TSH 0.870 01/30/2020    Free T4 1.07 01/30/2020    Vit D, 25-Hydroxy 51 03/24/2015    Sodium 140 07/17/2020    Potassium 4.1 07/17/2020    Chloride 106 07/17/2020    CO2 28 07/17/2020    Glucose 92 07/17/2020    BUN, Bld 16 07/17/2020    Creatinine 1.2 07/17/2020    Calcium 9.1 07/17/2020    Anion Gap 6 (L) 07/17/2020    eGFR if African American >60.0 07/17/2020    eGFR if non African American >60.0 07/17/2020       ASSESSMENT/PLAN:       Assessment    Mr. Gastelum is a 64 y.o. male who presents with evidence of Thyroid cancer [C73].    Plan    During this visit, lab and imaging results were reviewed with the patient and his spouse. Thyroid anatomy and physiology were reviewed and he was given a copy of our patient education booklet, Understanding Thyroid Disease. The above testing and examination support the diagnosis of thyroid carcinoma.     We again detailed the risk and options for this patient.  They include thyroid lobectomy(left) with ipsilateral central neck dissection versus up-front total thyroidectomy with left central neck dissection.  Potential complications of this operation and these options were reviewed with the patient.  He understands that thyroid lobectomy would require serial ultrasounds and would make thyroglobulin surveillance more difficult.  He also understands that pending final pathology there is a potential for need for completion thyroidectomy.  He has elected to undergo left thyroid lobectomy with central neck dissection.    The risks and benefits of my recommendations, as well as other treatment options were discussed with the patient today. In addition, I discussed the nature of the disease process and the risk  of surgery including, temporary or permanent hypoparathyroidism resulting in low blood calcium levels that require extensive medication to avoid serious degenerative conditions such as cataracts, brittle bones, muscle weakness, and muscle irritability. Other risks include bleeding, infection, injury to the recurrent laryngeal nerves resulting in hoarseness or impairment of speech and the risks of general anesthetic including MI, CVA, sudden death, or even reaction to anesthetic medications. The patient understands the risks, benefits, and treatment alternatives. Any and all questions were answered to the patient's satisfaction. Written consent was obtained.     left lobectomy with isthmusectomy and central neck dissection is scheduled for 10/16/2020. Prior to this we will ensure that the patient is medically optimized.

## 2020-09-28 NOTE — H&P (VIEW-ONLY)
Consult Note  Endocrine Surgery    Visit Diagnosis: Thyroid cancer [C73]    SUBJECTIVE:     Dani Gastelum Jr. is a 64 y.o. male seen at the request of Dr. Augie Xiao and Dr. Montalvo today in the Endocrine Surgery Clinic for evaluation of thyroid carcinoma. His history dates back to 12/2019 when patient was found to have thyroid nodule(incidental) on imaging of neck(MRI cervical spine). Subsequent evaluation included referral to an endocrinologist, neck ultrasound and fine needle aspiration.  At that point patient was diagnosed with papillary thyroid cancer.  He has seen Dr. Hein in total thyroidectomy was recommended.  Patient sought 2nd opinion as he desires thyroid lobectomy if at all possible.    Dani Gastelum Jr. complains of no new symptoms. The patient denies hot/cold intolerance, fatigue, unexplained weight changes, difficulty with swallowing, difficulty with shortness of breath especially with postural changes, change in voice quality and growth of thyroid nodule over time. He does not have a history of head and neck radiation therapy. He does not have a family history of thyroid disease. He does not have a family history of endocrinopathies. He is not taking thyroid hormone supplementation. He has not undergone radioactive iodine treatment.      Review of patient's allergies indicates:  No Known Allergies    Current Outpatient Medications   Medication Sig Dispense Refill    aspirin 81 MG Chew Take 81 mg by mouth once daily.      co-enzyme Q-10 30 mg capsule Take 30 mg by mouth once daily.      fish oil-omega-3 fatty acids 300-1,000 mg capsule Take 2 g by mouth once daily.      fluticasone (FLONASE) 50 mcg/actuation nasal spray SPRAY TWICE IN NOSTRIL(S) DAILY AS NEEDED FOR ALLERGIC RHINITIS 1 Bottle 6    FOLIC ACID/MV,FE,OTHER MIN (CENTRUM ORAL) Take by mouth once daily.       glucosamine-chondroitin 500-400 mg tablet Take 1 tablet by mouth 3 (three) times daily. Pt taking one pill once a day.       latanoprost 0.005 % ophthalmic solution INT 1 GTT IN OU QD HS  3    losartan (COZAAR) 50 MG tablet TAKE 1 TABLET BY MOUTH EVERY DAY 30 tablet 6    pravastatin (PRAVACHOL) 40 MG tablet TAKE 1 TABLET BY MOUTH EVERY DAY 30 tablet 11    hydrocodone-acetaminophen 7.5-325mg (NORCO) 7.5-325 mg per tablet TK ONE T PO Q 4 TO 6 H PRF PAIN  0    ondansetron (ZOFRAN-ODT) 4 MG TbDL Take 1 tablet (4 mg total) by mouth every 6 (six) hours as needed. 30 tablet 0    pravastatin (PRAVACHOL) 40 MG tablet TAKE ONE TABLET BY MOUTH EVERY DAY 30 tablet 6    VOLTAREN 1 % Gel        No current facility-administered medications for this visit.        Past Medical History:   Diagnosis Date    ALLERGIC RHINITIS     Allergy     BPH (benign prostatic hypertrophy)     Hyperlipidemia     Hypertension     Thalassemia, beta     Thyroid cancer     Vertigo, intermittent      Past Surgical History:   Procedure Laterality Date    KNEE SURGERY Right     NASAL SEPTUM SURGERY      SHOULDER SURGERY Right     right rotator cuff    TRANSURETHRAL RESECTION OF PROSTATE      VASECTOMY       Social History     Tobacco Use    Smoking status: Never Smoker    Smokeless tobacco: Never Used   Substance Use Topics    Alcohol use: No    Drug use: No          Review of Systems:    Review of Systems   Constitutional: negative for chills, fatigue, fevers, malaise and night sweats  Eyes: negative for icterus and irritation  Respiratory: negative for cough and dyspnea on exertion  Cardiovascular: negative for chest pain and palpitations  Gastrointestinal: negative for constipation, diarrhea and dysphagia  Genitourinary:negative for dysuria, hematuria and nocturia  Integument/breast: negative for rash and skin color change  Hematologic/lymphatic: negative for bleeding and easy bruising  Neurological: negative for gait problems, headaches and seizures  Behavioral/Psych: negative for anxiety and depression  Endocrine: negative    ENT ROS:  "negative  Endocrine ROS:   negative for - hair pattern changes, malaise/lethargy, mood swings, palpitations or polydipsia/polyuria        OBJECTIVE:     Vital Signs:  /76   Pulse 65   Ht 5' 11" (1.803 m)   Wt 96.6 kg (213 lb)   BMI 29.71 kg/m²    Body mass index is 29.71 kg/m².      Physical Exam:    General:  no distress, see vitals for BMI    Eyes:  conjunctivae/corneas clear   Neck: trachea midline and symmetric, no adenopathy    Thyroid:  thyroid enlarged(mildly)   Lung: clear to auscultation bilaterally   Heart:  regular rate and rhythm   Abdomen: soft, non-tender; bowel sounds normal; no masses,  no organomegaly   Skin/Extremities: warm and well-perfused   Pulses: 2+ and symmetric   Neuro: normal without focal findings and mental status, speech normal, alert and oriented x3       Laboratory/Radiologic Studies    Studies:  Date:       Results:    Ultrasound:  9/1/2020 FINDINGS:  Multinodular enlarged thyroid        RIGHT LOBE:  Measures 6.24 x 2.14 x2.42 cm.     1.  Superior nodule measures 1.13 x 0.7 x 0.93 cm.  Isoechoic.  Microcalcifications.  2. Mid lobe cyst measures 1.32 x 1.02 x 1.33 cm.        LEFT LOBE:  Measures 6.21 x 2.47 x 2.57 cm.     1.  Superior nodule measures x 1.57 x 1.2 x 1.53 Cm.  Hypoechoic.  Grade 3 vascularity Wide>tall.  2.  Mid lobe nodule measures 1.37 x 0.92 x 1.19 cm.  3.  Inferior nodule measures 1.96 x 1.46 x 1.77 cm.  Hypoechoic.  Grade 2 vascularity.  Irregular borders.  4.  Inferior nodule measures 0.99 x 1 time 0.75 cm.  5.  Superior nodule measures 0.85 x 0.74 inferior nodule x 0.53 cm.     .        ISTHMUS:  Measures 0.25 cm.        LYMPH NODES:  No abnormal lymph nodes seen.     COMPARISON:  1/6/2020.     Impression:     Multinodular goiter with left lobe papillary carcinoma.  No pathologic lymph nodes identified.  Inferior left lobe nodule has enlarged compared to previous ultrasound   Pathology::  3/3/2020 THYROID, LEFT INFERIOR LOBE, FINE NEEDLE ASPIRATION:  - " [facility] Thyroid Cytology Category: Malignant  - Papillary thyroid carcinoma    THYROID, LEFT SUPERIOR LOBE, FINE NEEDLE ASPIRATION:  - [facility]roid Cytology Category: Benign  - Abundant colloid and benign follicular cells present         Component Value Date    TSH 0.870 01/30/2020    Free T4 1.07 01/30/2020    Vit D, 25-Hydroxy 51 03/24/2015    Sodium 140 07/17/2020    Potassium 4.1 07/17/2020    Chloride 106 07/17/2020    CO2 28 07/17/2020    Glucose 92 07/17/2020    BUN, Bld 16 07/17/2020    Creatinine 1.2 07/17/2020    Calcium 9.1 07/17/2020    Anion Gap 6 (L) 07/17/2020    eGFR if African American >60.0 07/17/2020    eGFR if non African American >60.0 07/17/2020       ASSESSMENT/PLAN:       Assessment    Mr. Gastelum is a 64 y.o. male who presents with evidence of Thyroid cancer [C73].    Plan    During this visit, lab and imaging results were reviewed with the patient and his spouse. Thyroid anatomy and physiology were reviewed and he was given a copy of our patient education booklet, Understanding Thyroid Disease. The above testing and examination support the diagnosis of thyroid carcinoma.     We again detailed the risk and options for this patient.  They include thyroid lobectomy(left) with ipsilateral central neck dissection versus up-front total thyroidectomy with left central neck dissection.  Potential complications of this operation and these options were reviewed with the patient.  He understands that thyroid lobectomy would require serial ultrasounds and would make thyroglobulin surveillance more difficult.  He also understands that pending final pathology there is a potential for need for completion thyroidectomy.  He has elected to undergo left thyroid lobectomy with central neck dissection.    The risks and benefits of my recommendations, as well as other treatment options were discussed with the patient today. In addition, I discussed the nature of the disease process and the risk  of surgery including, temporary or permanent hypoparathyroidism resulting in low blood calcium levels that require extensive medication to avoid serious degenerative conditions such as cataracts, brittle bones, muscle weakness, and muscle irritability. Other risks include bleeding, infection, injury to the recurrent laryngeal nerves resulting in hoarseness or impairment of speech and the risks of general anesthetic including MI, CVA, sudden death, or even reaction to anesthetic medications. The patient understands the risks, benefits, and treatment alternatives. Any and all questions were answered to the patient's satisfaction. Written consent was obtained.     left lobectomy with isthmusectomy and central neck dissection is scheduled for 10/16/2020. Prior to this we will ensure that the patient is medically optimized.

## 2020-09-28 NOTE — LETTER
October 2, 2020      Augie Xiao MD  [address] [facility] [address]           [place] [address]  [address] [facility], [address]  [facility] [address]  Phone: [phone]  Fax: [phone]          Patient: Dani Gastelum Jr.   MR Number: [id number]   YOB: 1955   Date of Visit: 9/28/2020       Dear Dr. Augie Xiao:    Thank you for referring Dani Gastelum to me for evaluation. Attached you will find relevant portions of my assessment and plan of care.    If you have questions, please do not hesitate to call me. I look forward to following Dani Gastelum along with you.    Sincerely,    Una Lobato MD    Enclosure  CC:  No Recipients    If you would like to receive this communication electronically, please contact [email or web address] or [phone] to request more information on [employer] Link access.    For providers and/or their staff who would like to refer a patient to [facility], please contact us through our one-stop-shop provider referral line, [facility], at [phone].    If you feel you have received this communication in error or would no longer like to receive these types of communications, please e-mail [email or web address]

## 2020-09-30 ENCOUNTER — TELEPHONE (OUTPATIENT)
Dept: INTERNAL MEDICINE | Facility: CLINIC | Age: 65
End: 2020-09-30

## 2020-09-30 DIAGNOSIS — Z01.818 PREOP EXAM FOR INTERNAL MEDICINE: Primary | ICD-10-CM

## 2020-09-30 NOTE — TELEPHONE ENCOUNTER
----- Message from Naomi Rich MA sent at 9/28/2020 11:12 AM CDT -----    ----- Message -----  From: Cayla Sarmiento RN  Sent: 9/28/2020  10:51 AM CDT  To: [facility]ARE[facility] Staff    Mr. Gastelum will be having a thyroidectomy with Dr. Lobato on 9/16/2020.  Can he be medically cleared prior to the procedure?  Thanks,  Cayla Sarmiento RN, [facility]  [phone]

## 2020-09-30 NOTE — TELEPHONE ENCOUNTER
Patient is clinically stable and medically cleared for surgery as scheduled pending review of updated EKG which will be ordered.

## 2020-10-01 ENCOUNTER — CLINICAL SUPPORT (OUTPATIENT)
Dept: INTERNAL MEDICINE | Facility: CLINIC | Age: 65
End: 2020-10-01
Payer: COMMERCIAL

## 2020-10-01 DIAGNOSIS — I10 ESSENTIAL HYPERTENSION: ICD-10-CM

## 2020-10-01 PROCEDURE — 99999 PR PBB SHADOW E&M-EST. PATIENT-LVL I: CPT | Mod: PBBFAC,,,

## 2020-10-01 PROCEDURE — 93010 EKG 12-LEAD: ICD-10-PCS | Mod: S$GLB,,, | Performed by: INTERNAL MEDICINE

## 2020-10-01 PROCEDURE — 99999 PR PBB SHADOW E&M-EST. PATIENT-LVL I: ICD-10-PCS | Mod: PBBFAC,,,

## 2020-10-01 PROCEDURE — 93010 ELECTROCARDIOGRAM REPORT: CPT | Mod: S$GLB,,, | Performed by: INTERNAL MEDICINE

## 2020-10-01 PROCEDURE — 93005 ELECTROCARDIOGRAM TRACING: CPT | Mod: S$GLB,,, | Performed by: INTERNAL MEDICINE

## 2020-10-01 PROCEDURE — 93005 EKG 12-LEAD: ICD-10-PCS | Mod: S$GLB,,, | Performed by: INTERNAL MEDICINE

## 2020-10-03 ENCOUNTER — HOSPITAL ENCOUNTER (OUTPATIENT)
Dept: RADIOLOGY | Facility: HOSPITAL | Age: 65
Discharge: HOME | End: 2020-10-03
Attending: INTERNAL MEDICINE
Payer: COMMERCIAL

## 2020-10-03 DIAGNOSIS — R10.31 GROIN PAIN, CHRONIC, RIGHT: ICD-10-CM

## 2020-10-03 DIAGNOSIS — G89.29 GROIN PAIN, CHRONIC, RIGHT: ICD-10-CM

## 2020-10-03 PROCEDURE — 73718 MRI FEMUR WITHOUT CONTRAST RIGHT: ICD-10-PCS | Mod: 26,RT,, | Performed by: RADIOLOGY

## 2020-10-03 PROCEDURE — 73718 MRI LOWER EXTREMITY W/O DYE: CPT | Mod: TC,RT

## 2020-10-03 PROCEDURE — 73718 MRI LOWER EXTREMITY W/O DYE: CPT | Mod: 26,RT,, | Performed by: RADIOLOGY

## 2020-10-06 ENCOUNTER — HOSPITAL ENCOUNTER (OUTPATIENT)
Dept: RADIOLOGY | Facility: HOSPITAL | Age: 65
Discharge: HOME | End: 2020-10-06
Attending: SURGERY
Payer: COMMERCIAL

## 2020-10-06 DIAGNOSIS — C73 THYROID CANCER: ICD-10-CM

## 2020-10-06 PROCEDURE — 71045 X-RAY EXAM CHEST 1 VIEW: CPT | Mod: 26,,, | Performed by: RADIOLOGY

## 2020-10-06 PROCEDURE — 71045 X-RAY EXAM CHEST 1 VIEW: CPT | Mod: TC,PO

## 2020-10-06 PROCEDURE — 71045 XR CHEST 1 VIEW: ICD-10-PCS | Mod: 26,,, | Performed by: RADIOLOGY

## 2020-10-13 ENCOUNTER — HOSPITAL ENCOUNTER (OUTPATIENT)
Dept: PREADMISSION TESTING | Facility: OTHER | Age: 65
Discharge: HOME | End: 2020-10-13
Attending: SURGERY
Payer: COMMERCIAL

## 2020-10-13 ENCOUNTER — ANESTHESIA EVENT (OUTPATIENT)
Dept: SURGERY | Facility: OTHER | Age: 65
End: 2020-10-13
Payer: COMMERCIAL

## 2020-10-13 VITALS
WEIGHT: 214 LBS | RESPIRATION RATE: 16 BRPM | TEMPERATURE: 96 F | BODY MASS INDEX: 29.96 KG/M2 | OXYGEN SATURATION: 98 % | HEIGHT: 71 IN | HEART RATE: 72 BPM | DIASTOLIC BLOOD PRESSURE: 70 MMHG | SYSTOLIC BLOOD PRESSURE: 140 MMHG

## 2020-10-13 DIAGNOSIS — C73 THYROID CANCER: ICD-10-CM

## 2020-10-13 LAB
25(OH)D3+25(OH)D2 SERPL-MCNC: 51 NG/ML (ref 30–96)
ALBUMIN SERPL BCP-MCNC: 3.9 G/DL (ref 3.5–5.2)
ANION GAP SERPL CALC-SCNC: 10 MMOL/L (ref 8–16)
BASOPHILS # BLD AUTO: 0.03 K/UL (ref 0–0.2)
BASOPHILS NFR BLD: 0.6 % (ref 0–1.9)
BUN SERPL-MCNC: 20 MG/DL (ref 8–23)
CALCIUM SERPL-MCNC: 9.3 MG/DL (ref 8.7–10.5)
CHLORIDE SERPL-SCNC: 106 MMOL/L (ref 95–110)
CO2 SERPL-SCNC: 24 MMOL/L (ref 23–29)
CREAT SERPL-MCNC: 1.2 MG/DL (ref 0.5–1.4)
DIFFERENTIAL METHOD: ABNORMAL
EOSINOPHIL # BLD AUTO: 0.1 K/UL (ref 0–0.5)
EOSINOPHIL NFR BLD: 1.6 % (ref 0–8)
ERYTHROCYTE [DISTWIDTH] IN BLOOD BY AUTOMATED COUNT: 14.3 % (ref 11.5–14.5)
EST. GFR  (AFRICAN AMERICAN): >60 ML/MIN/1.73 M^2
EST. GFR  (NON AFRICAN AMERICAN): >60 ML/MIN/1.73 M^2
GLUCOSE SERPL-MCNC: 86 MG/DL (ref 70–110)
HCT VFR BLD AUTO: 41.1 % (ref 40–54)
HGB BLD-MCNC: 12.8 G/DL (ref 14–18)
IMM GRANULOCYTES # BLD AUTO: 0.01 K/UL (ref 0–0.04)
IMM GRANULOCYTES NFR BLD AUTO: 0.2 % (ref 0–0.5)
LYMPHOCYTES # BLD AUTO: 2.3 K/UL (ref 1–4.8)
LYMPHOCYTES NFR BLD: 46.1 % (ref 18–48)
MCH RBC QN AUTO: 23.7 PG (ref 27–31)
MCHC RBC AUTO-ENTMCNC: 31.1 G/DL (ref 32–36)
MCV RBC AUTO: 76 FL (ref 82–98)
MONOCYTES # BLD AUTO: 0.5 K/UL (ref 0.3–1)
MONOCYTES NFR BLD: 9.2 % (ref 4–15)
NEUTROPHILS # BLD AUTO: 2.1 K/UL (ref 1.8–7.7)
NEUTROPHILS NFR BLD: 42.3 % (ref 38–73)
NRBC BLD-RTO: 0 /100 WBC
PHOSPHATE SERPL-MCNC: 3.5 MG/DL (ref 2.7–4.5)
PLATELET # BLD AUTO: 292 K/UL (ref 150–350)
PMV BLD AUTO: 9.3 FL (ref 9.2–12.9)
POTASSIUM SERPL-SCNC: 4.6 MMOL/L (ref 3.5–5.1)
RBC # BLD AUTO: 5.41 M/UL (ref 4.6–6.2)
SODIUM SERPL-SCNC: 140 MMOL/L (ref 136–145)
WBC # BLD AUTO: 5.01 K/UL (ref 3.9–12.7)

## 2020-10-13 PROCEDURE — 36415 COLL VENOUS BLD VENIPUNCTURE: CPT

## 2020-10-13 PROCEDURE — U0003 INFECTIOUS AGENT DETECTION BY NUCLEIC ACID (DNA OR RNA); SEVERE ACUTE RESPIRATORY SYNDROME CORONAVIRUS 2 (SARS-COV-2) (CORONAVIRUS DISEASE [COVID-19]), AMPLIFIED PROBE TECHNIQUE, MAKING USE OF HIGH THROUGHPUT TECHNOLOGIES AS DESCRIBED BY CMS-2020-01-R: HCPCS

## 2020-10-13 PROCEDURE — 82306 VITAMIN D 25 HYDROXY: CPT

## 2020-10-13 PROCEDURE — 85025 COMPLETE CBC W/AUTO DIFF WBC: CPT

## 2020-10-13 PROCEDURE — 80069 RENAL FUNCTION PANEL: CPT

## 2020-10-13 RX ORDER — ONDANSETRON 4 MG/1
4 TABLET, ORALLY DISINTEGRATING ORAL ONCE
COMMUNITY

## 2020-10-13 RX ORDER — CELECOXIB 200 MG/1
400 CAPSULE ORAL
Status: CANCELLED | OUTPATIENT
Start: 2020-10-13 | End: 2020-10-13

## 2020-10-13 RX ORDER — LIDOCAINE HYDROCHLORIDE 10 MG/ML
0.5 INJECTION, SOLUTION EPIDURAL; INFILTRATION; INTRACAUDAL; PERINEURAL ONCE
Status: CANCELLED | OUTPATIENT
Start: 2020-10-13 | End: 2020-10-13

## 2020-10-13 RX ORDER — SODIUM CHLORIDE, SODIUM LACTATE, POTASSIUM CHLORIDE, CALCIUM CHLORIDE 600; 310; 30; 20 MG/100ML; MG/100ML; MG/100ML; MG/100ML
INJECTION, SOLUTION INTRAVENOUS CONTINUOUS
Status: CANCELLED | OUTPATIENT
Start: 2020-10-13

## 2020-10-13 RX ORDER — FAMOTIDINE 20 MG/1
20 TABLET, FILM COATED ORAL
Status: CANCELLED | OUTPATIENT
Start: 2020-10-13 | End: 2020-10-13

## 2020-10-13 RX ORDER — ACETAMINOPHEN 500 MG
1000 TABLET ORAL
Status: CANCELLED | OUTPATIENT
Start: 2020-10-13 | End: 2020-10-13

## 2020-10-13 NOTE — DISCHARGE INSTRUCTIONS
Information to Prepare you for your Surgery    PRE-ADMIT TESTING -  [phone]    [address]  [place]          Your surgery has been scheduled at [facility]. We are pleased to have the opportunity to serve you. For Further Information please call [phone].    On the day of surgery please report to the Information Desk on the 1st floor.    · CONTACT YOUR PHYSICIAN'S OFFICE THE DAY PRIOR TO YOUR SURGERY TO OBTAIN YOUR ARRIVAL TIME.     · The evening before surgery do not eat anything after 9 p.m. ( this includes hard candy, chewing gum and mints).  You may only have GATORADE, POWERADE AND WATER  from 9 p.m. until you leave your home.   DO NOT DRINK ANY LIQUIDS ON THE WAY TO THE HOSPITAL.      SPECIAL MEDICATION INSTRUCTIONS: TAKE medications checked off by the Anesthesiologist on your Medication List.    Angiogram Patients: Take medications as instructed by your physician, including aspirin.     Surgery Patients:    If you take ASPIRIN - Your PHYSICIAN/SURGEON will need to inform you IF/OR when you need to stop taking aspirin prior to your surgery.     Do Not take any medications containing IBUPROFEN.  Do Not Wear any make-up or dark nail polish   (especially eye make-up) to surgery. If you come to surgery with makeup on you will be required to remove the makeup or nail polish.    Do not shave your surgical area at least 5 days prior to your surgery. The surgical prep will be performed at the hospital according to Infection Control regulations.    Leave all valuables at home.   Do Not wear any jewelry or watches, including any metal in body piercings. Jewelry must be removed prior to coming to the hospital.  There is a possibility that rings that are unable to be removed may be cut off if they are on the surgical extremity.    Contact Lens must be removed before surgery. Either do not wear the contact lens or bring a case and solution for  storage.  Please bring a container for eyeglasses or dentures as required.  Bring any paperwork your physician has provided, such as consent forms,  history and physicals, doctor's orders, etc.   Bring comfortable clothes that are loose fitting to wear upon discharge. Take into consideration the type of surgery being performed.  Maintain your diet as advised per your physician the day prior to surgery.      Adequate rest the night before surgery is advised.   Park in the Parking lot behind the hospital or in the [place] [facility] across the street from the parking lot. Parking is complimentary.  If you will be discharged the same day as your procedure, please arrange for a responsible adult to drive you home or to accompany you if traveling by taxi.   YOU WILL NOT BE PERMITTED TO DRIVE OR TO LEAVE THE HOSPITAL ALONE AFTER SURGERY.   If you are being discharged the same day, it is strongly recommended that you arrange for someone to remain with you for the first 24 hrs following your surgery.    The Surgeon will speak to your family/visitor after your surgery regarding the outcome of your surgery and post op care.  The Surgeon may speak to you after your surgery, but there is a possibility you may not remember the details.  Please check with your family members regarding the conversation with the Surgeon.    We strongly recommend whoever is bringing you home be present for discharge instructions.  This will ensure a thorough understanding for your post op home care.    ALL CHILDREN MUST ALWAYS BE ACCOMPANIED BY AN ADULT.    Visitors-Refer to current Visitor policy handouts.    Thank you for your cooperation.  The Staff of [facility].                Bathing Instructions with Hibiclens     Shower the evening before and morning of your procedure with Hibiclens:   Wash your face with water and your regular face wash/soap   Apply Hibiclens directly on your skin or on a wet washcloth and wash  gently. When showering: Move away from the shower stream when applying Hibiclens to avoid rinsing off too soon.   Rinse thoroughly with warm water   Do not dilute Hibiclens         Dry off as usual, do not use any deodorant, powder, body lotions, perfume, after shave or cologne.

## 2020-10-13 NOTE — ANESTHESIA PREPROCEDURE EVALUATION
10/13/2020  Dani Gastelum Jr. is a 65 y.o., male.    Anesthesia Evaluation    I have reviewed the Patient Summary Reports.    I have reviewed the Nursing Notes. I have reviewed the NPO Status.   I have reviewed the Medications.     Review of Systems  Anesthesia Hx:  No problems with previous Anesthesia    Hematology/Oncology:        Hematology Comments: Thalassemia Oncology Comments: Thyroid cancer.    EENT/Dental:   chronic allergic rhinitis   Cardiovascular:   Exercise tolerance: good Hypertension, well controlled CAD asymptomatic  hyperlipidemia    Pulmonary:  Pulmonary Normal    Renal/:   BPH    Hepatic/GI:  Hepatic/GI Normal    Musculoskeletal:   Arthritis   Spine Disorders: thoracic, cervical and lumbar    Neurological:   Vertigo.   Endocrine:  Endocrine Normal    Dermatological:  Skin Normal    Psych:  Psychiatric Normal           Physical Exam  General:  Well nourished    Airway/Jaw/Neck:  Airway Findings: Mouth Opening: Normal Tongue: Normal  General Airway Assessment: Adult  Mallampati: I  TM Distance: Normal, at least 6 cm  Jaw/Neck Findings:  Neck ROM: Normal ROM      Dental:  Dental Findings: In tact, Molar caps, Upper front caps        Mental Status:  Mental Status Findings:  Cooperative, Alert and Oriented         Anesthesia Plan  Type of Anesthesia, risks & benefits discussed:  Anesthesia Type:  general  Patient's Preference:   Intra-op Monitoring Plan: standard ASA monitors  Intra-op Monitoring Plan Comments:   Post Op Pain Control Plan: per primary service following discharge from PACU and multimodal analgesia  Post Op Pain Control Plan Comments:   Induction:    Beta Blocker:         Informed Consent: Patient understands risks and agrees with Anesthesia plan.  Questions answered. Anesthesia consent signed with patient.  ASA Score: 3     Day of Surgery Review of History & Physical:    H&P  update referred to the surgeon.     Anesthesia Plan Notes: Labs are in Epic. Cleared by Dr. Hill at [facility].        Ready For Surgery From Anesthesia Perspective.

## 2020-10-14 LAB — SARS-COV-2 RNA RESP QL NAA+PROBE: NOT DETECTED

## 2020-10-15 ENCOUNTER — TELEPHONE (OUTPATIENT)
Dept: SURGERY | Facility: CLINIC | Age: 65
End: 2020-10-15

## 2020-10-16 ENCOUNTER — HOSPITAL ENCOUNTER (OUTPATIENT)
Facility: OTHER | Age: 65
Discharge: HOME | End: 2020-10-17
Attending: SURGERY | Admitting: SURGERY
Payer: COMMERCIAL

## 2020-10-16 ENCOUNTER — ANESTHESIA (OUTPATIENT)
Dept: SURGERY | Facility: OTHER | Age: 65
End: 2020-10-16
Payer: COMMERCIAL

## 2020-10-16 DIAGNOSIS — C73 THYROID CANCER: Primary | ICD-10-CM

## 2020-10-16 DIAGNOSIS — C73 PAPILLARY THYROID CARCINOMA: ICD-10-CM

## 2020-10-16 LAB — PTH-INTACT SERPL-MCNC: 36.2 PG/ML (ref 9–77)

## 2020-10-16 PROCEDURE — 25000003 PHARM REV CODE 250: Performed by: SURGERY

## 2020-10-16 PROCEDURE — 36000706: Performed by: SURGERY

## 2020-10-16 PROCEDURE — 94799 UNLISTED PULMONARY SVC/PX: CPT

## 2020-10-16 PROCEDURE — 63600175 PHARM REV CODE 636 W HCPCS: Performed by: NURSE ANESTHETIST, CERTIFIED REGISTERED

## 2020-10-16 PROCEDURE — 60252 REMOVAL OF THYROID: CPT | Mod: ,,, | Performed by: SURGERY

## 2020-10-16 PROCEDURE — 63600175 PHARM REV CODE 636 W HCPCS: Performed by: SURGERY

## 2020-10-16 PROCEDURE — 71000033 HC RECOVERY, INTIAL HOUR: Performed by: SURGERY

## 2020-10-16 PROCEDURE — 88305 TISSUE EXAM BY PATHOLOGIST: CPT | Performed by: STUDENT IN AN ORGANIZED HEALTH CARE EDUCATION/TRAINING PROGRAM

## 2020-10-16 PROCEDURE — 25000003 PHARM REV CODE 250: Performed by: SPECIALIST

## 2020-10-16 PROCEDURE — 88307 TISSUE EXAM BY PATHOLOGIST: CPT | Performed by: STUDENT IN AN ORGANIZED HEALTH CARE EDUCATION/TRAINING PROGRAM

## 2020-10-16 PROCEDURE — 88305 TISSUE EXAM BY PATHOLOGIST: CPT | Mod: 26,,, | Performed by: STUDENT IN AN ORGANIZED HEALTH CARE EDUCATION/TRAINING PROGRAM

## 2020-10-16 PROCEDURE — 60252 PR THYROIDECTOMY,MALIG,LTD NECK SURG: ICD-10-PCS | Mod: ,,, | Performed by: SURGERY

## 2020-10-16 PROCEDURE — 25000003 PHARM REV CODE 250: Performed by: NURSE ANESTHETIST, CERTIFIED REGISTERED

## 2020-10-16 PROCEDURE — 63600175 PHARM REV CODE 636 W HCPCS: Performed by: SPECIALIST

## 2020-10-16 PROCEDURE — 36000707: Performed by: SURGERY

## 2020-10-16 PROCEDURE — 88305 TISSUE EXAM BY PATHOLOGIST: ICD-10-PCS | Mod: 26,,, | Performed by: STUDENT IN AN ORGANIZED HEALTH CARE EDUCATION/TRAINING PROGRAM

## 2020-10-16 PROCEDURE — 88307 PR  SURG PATH,LEVEL V: ICD-10-PCS | Mod: 26,,, | Performed by: STUDENT IN AN ORGANIZED HEALTH CARE EDUCATION/TRAINING PROGRAM

## 2020-10-16 PROCEDURE — 25000003 PHARM REV CODE 250: Performed by: ANESTHESIOLOGY

## 2020-10-16 PROCEDURE — 88307 TISSUE EXAM BY PATHOLOGIST: CPT | Mod: 26,,, | Performed by: STUDENT IN AN ORGANIZED HEALTH CARE EDUCATION/TRAINING PROGRAM

## 2020-10-16 PROCEDURE — 83970 ASSAY OF PARATHORMONE: CPT

## 2020-10-16 PROCEDURE — 71000039 HC RECOVERY, EACH ADD'L HOUR: Performed by: SURGERY

## 2020-10-16 PROCEDURE — 94761 N-INVAS EAR/PLS OXIMETRY MLT: CPT

## 2020-10-16 PROCEDURE — 27201423 OPTIME MED/SURG SUP & DEVICES STERILE SUPPLY: Performed by: SURGERY

## 2020-10-16 PROCEDURE — 63600175 PHARM REV CODE 636 W HCPCS: Performed by: ANESTHESIOLOGY

## 2020-10-16 PROCEDURE — 36415 COLL VENOUS BLD VENIPUNCTURE: CPT

## 2020-10-16 PROCEDURE — 37000009 HC ANESTHESIA EA ADD 15 MINS: Performed by: SURGERY

## 2020-10-16 PROCEDURE — 37000008 HC ANESTHESIA 1ST 15 MINUTES: Performed by: SURGERY

## 2020-10-16 RX ORDER — SODIUM CHLORIDE 9 MG/ML
INJECTION, SOLUTION INTRAVENOUS CONTINUOUS
Status: DISCONTINUED | OUTPATIENT
Start: 2020-10-16 | End: 2020-10-16

## 2020-10-16 RX ORDER — CEFAZOLIN SODIUM 2 G/50ML
2 SOLUTION INTRAVENOUS
Status: DISCONTINUED | OUTPATIENT
Start: 2020-10-16 | End: 2020-10-17 | Stop reason: HOSPADM

## 2020-10-16 RX ORDER — SODIUM CHLORIDE 0.9 % (FLUSH) 0.9 %
3 SYRINGE (ML) INJECTION
Status: DISCONTINUED | OUTPATIENT
Start: 2020-10-16 | End: 2020-10-17 | Stop reason: HOSPADM

## 2020-10-16 RX ORDER — FAMOTIDINE 20 MG/1
20 TABLET, FILM COATED ORAL
Status: COMPLETED | OUTPATIENT
Start: 2020-10-16 | End: 2020-10-16

## 2020-10-16 RX ORDER — CELECOXIB 200 MG/1
400 CAPSULE ORAL
Status: COMPLETED | OUTPATIENT
Start: 2020-10-16 | End: 2020-10-16

## 2020-10-16 RX ORDER — LEVOTHYROXINE SODIUM 150 UG/1
150 TABLET ORAL DAILY
Qty: 30 TABLET | Refills: 2 | Status: SHIPPED | OUTPATIENT
Start: 2020-10-16 | End: 2020-10-26

## 2020-10-16 RX ORDER — LIDOCAINE HYDROCHLORIDE 10 MG/ML
0.5 INJECTION, SOLUTION EPIDURAL; INFILTRATION; INTRACAUDAL; PERINEURAL ONCE
Status: DISCONTINUED | OUTPATIENT
Start: 2020-10-16 | End: 2020-10-16 | Stop reason: HOSPADM

## 2020-10-16 RX ORDER — MEPERIDINE HYDROCHLORIDE 25 MG/ML
12.5 INJECTION INTRAMUSCULAR; INTRAVENOUS; SUBCUTANEOUS ONCE AS NEEDED
Status: DISCONTINUED | OUTPATIENT
Start: 2020-10-16 | End: 2020-10-16 | Stop reason: HOSPADM

## 2020-10-16 RX ORDER — AMOXICILLIN 250 MG
1 CAPSULE ORAL 2 TIMES DAILY
COMMUNITY
Start: 2020-10-16 | End: 2021-09-23

## 2020-10-16 RX ORDER — HYDROMORPHONE HYDROCHLORIDE 2 MG/ML
0.4 INJECTION, SOLUTION INTRAMUSCULAR; INTRAVENOUS; SUBCUTANEOUS EVERY 5 MIN PRN
Status: DISCONTINUED | OUTPATIENT
Start: 2020-10-16 | End: 2020-10-16 | Stop reason: HOSPADM

## 2020-10-16 RX ORDER — PROPOFOL 10 MG/ML
VIAL (ML) INTRAVENOUS
Status: DISCONTINUED | OUTPATIENT
Start: 2020-10-16 | End: 2020-10-16

## 2020-10-16 RX ORDER — GABAPENTIN 300 MG/1
300 CAPSULE ORAL 3 TIMES DAILY
Status: DISCONTINUED | OUTPATIENT
Start: 2020-10-16 | End: 2020-10-17 | Stop reason: HOSPADM

## 2020-10-16 RX ORDER — SODIUM CHLORIDE, SODIUM LACTATE, POTASSIUM CHLORIDE, CALCIUM CHLORIDE 600; 310; 30; 20 MG/100ML; MG/100ML; MG/100ML; MG/100ML
INJECTION, SOLUTION INTRAVENOUS CONTINUOUS
Status: DISCONTINUED | OUTPATIENT
Start: 2020-10-16 | End: 2020-10-16

## 2020-10-16 RX ORDER — HYDROXYZINE HYDROCHLORIDE 25 MG/1
25 TABLET, FILM COATED ORAL EVERY 4 HOURS PRN
Status: DISCONTINUED | OUTPATIENT
Start: 2020-10-16 | End: 2020-10-17 | Stop reason: HOSPADM

## 2020-10-16 RX ORDER — SUCCINYLCHOLINE CHLORIDE 20 MG/ML
INJECTION INTRAMUSCULAR; INTRAVENOUS
Status: DISCONTINUED | OUTPATIENT
Start: 2020-10-16 | End: 2020-10-16

## 2020-10-16 RX ORDER — SCOLOPAMINE TRANSDERMAL SYSTEM 1 MG/1
1 PATCH, EXTENDED RELEASE TRANSDERMAL ONCE AS NEEDED
Status: COMPLETED | OUTPATIENT
Start: 2020-10-16 | End: 2020-10-16

## 2020-10-16 RX ORDER — OXYCODONE HYDROCHLORIDE 5 MG/1
5 TABLET ORAL EVERY 4 HOURS PRN
Status: DISCONTINUED | OUTPATIENT
Start: 2020-10-16 | End: 2020-10-17 | Stop reason: HOSPADM

## 2020-10-16 RX ORDER — OXYCODONE HYDROCHLORIDE 5 MG/1
10 TABLET ORAL EVERY 4 HOURS PRN
Status: DISCONTINUED | OUTPATIENT
Start: 2020-10-16 | End: 2020-10-17 | Stop reason: HOSPADM

## 2020-10-16 RX ORDER — BUPIVACAINE HYDROCHLORIDE 5 MG/ML
INJECTION, SOLUTION EPIDURAL; INTRACAUDAL
Status: DISCONTINUED | OUTPATIENT
Start: 2020-10-16 | End: 2020-10-16 | Stop reason: HOSPADM

## 2020-10-16 RX ORDER — ACETAMINOPHEN 500 MG
1000 TABLET ORAL
Status: COMPLETED | OUTPATIENT
Start: 2020-10-16 | End: 2020-10-16

## 2020-10-16 RX ORDER — KETAMINE HCL IN 0.9 % NACL 50 MG/5 ML
SYRINGE (ML) INTRAVENOUS
Status: DISCONTINUED | OUTPATIENT
Start: 2020-10-16 | End: 2020-10-16

## 2020-10-16 RX ORDER — ONDANSETRON 2 MG/ML
4 INJECTION INTRAMUSCULAR; INTRAVENOUS DAILY PRN
Status: DISCONTINUED | OUTPATIENT
Start: 2020-10-16 | End: 2020-10-16 | Stop reason: HOSPADM

## 2020-10-16 RX ORDER — ZOLPIDEM TARTRATE 5 MG/1
5 TABLET ORAL NIGHTLY PRN
Status: DISCONTINUED | OUTPATIENT
Start: 2020-10-16 | End: 2020-10-17 | Stop reason: HOSPADM

## 2020-10-16 RX ORDER — LIDOCAINE HCL/PF 100 MG/5ML
SYRINGE (ML) INTRAVENOUS
Status: DISCONTINUED | OUTPATIENT
Start: 2020-10-16 | End: 2020-10-16

## 2020-10-16 RX ORDER — LEVOTHYROXINE SODIUM 75 UG/1
150 TABLET ORAL
Status: DISCONTINUED | OUTPATIENT
Start: 2020-10-17 | End: 2020-10-17 | Stop reason: HOSPADM

## 2020-10-16 RX ORDER — DEXAMETHASONE SODIUM PHOSPHATE 4 MG/ML
INJECTION, SOLUTION INTRA-ARTICULAR; INTRALESIONAL; INTRAMUSCULAR; INTRAVENOUS; SOFT TISSUE
Status: DISCONTINUED | OUTPATIENT
Start: 2020-10-16 | End: 2020-10-16

## 2020-10-16 RX ORDER — METHOCARBAMOL 500 MG/1
500 TABLET, FILM COATED ORAL 4 TIMES DAILY
Status: DISCONTINUED | OUTPATIENT
Start: 2020-10-16 | End: 2020-10-17 | Stop reason: HOSPADM

## 2020-10-16 RX ORDER — LOSARTAN POTASSIUM 50 MG/1
50 TABLET ORAL NIGHTLY
Status: DISCONTINUED | OUTPATIENT
Start: 2020-10-16 | End: 2020-10-17 | Stop reason: HOSPADM

## 2020-10-16 RX ORDER — ONDANSETRON 8 MG/1
8 TABLET, ORALLY DISINTEGRATING ORAL EVERY 8 HOURS PRN
Status: DISCONTINUED | OUTPATIENT
Start: 2020-10-16 | End: 2020-10-17 | Stop reason: HOSPADM

## 2020-10-16 RX ORDER — OXYCODONE HYDROCHLORIDE 5 MG/1
5 TABLET ORAL
Status: DISCONTINUED | OUTPATIENT
Start: 2020-10-16 | End: 2020-10-16 | Stop reason: HOSPADM

## 2020-10-16 RX ORDER — FENTANYL CITRATE 50 UG/ML
INJECTION, SOLUTION INTRAMUSCULAR; INTRAVENOUS
Status: DISCONTINUED | OUTPATIENT
Start: 2020-10-16 | End: 2020-10-16

## 2020-10-16 RX ORDER — ROCURONIUM BROMIDE 10 MG/ML
INJECTION, SOLUTION INTRAVENOUS
Status: DISCONTINUED | OUTPATIENT
Start: 2020-10-16 | End: 2020-10-16

## 2020-10-16 RX ORDER — ONDANSETRON HYDROCHLORIDE 2 MG/ML
INJECTION, SOLUTION INTRAMUSCULAR; INTRAVENOUS
Status: DISCONTINUED | OUTPATIENT
Start: 2020-10-16 | End: 2020-10-16

## 2020-10-16 RX ORDER — SODIUM CHLORIDE 9 MG/ML
INJECTION, SOLUTION INTRAVENOUS CONTINUOUS
Status: DISCONTINUED | OUTPATIENT
Start: 2020-10-16 | End: 2020-10-17

## 2020-10-16 RX ORDER — AMOXICILLIN 250 MG
1 CAPSULE ORAL 2 TIMES DAILY
Status: DISCONTINUED | OUTPATIENT
Start: 2020-10-16 | End: 2020-10-17 | Stop reason: HOSPADM

## 2020-10-16 RX ADMIN — PHENYLEPHRINE HYDROCHLORIDE 0.5 MCG/KG/MIN: 10 INJECTION INTRAVENOUS at 06:10

## 2020-10-16 RX ADMIN — LOSARTAN POTASSIUM 50 MG: 50 TABLET ORAL at 10:10

## 2020-10-16 RX ADMIN — FENTANYL CITRATE 50 MCG: 50 INJECTION, SOLUTION INTRAMUSCULAR; INTRAVENOUS at 09:10

## 2020-10-16 RX ADMIN — METHOCARBAMOL TABLETS 500 MG: 500 TABLET, COATED ORAL at 10:10

## 2020-10-16 RX ADMIN — ONDANSETRON 4 MG: 2 INJECTION, SOLUTION INTRAMUSCULAR; INTRAVENOUS at 06:10

## 2020-10-16 RX ADMIN — LIDOCAINE HYDROCHLORIDE 40 MG: 20 INJECTION, SOLUTION INTRAVENOUS at 10:10

## 2020-10-16 RX ADMIN — SODIUM CHLORIDE: 0.9 INJECTION, SOLUTION INTRAVENOUS at 01:10

## 2020-10-16 RX ADMIN — FENTANYL CITRATE 50 MCG: 50 INJECTION, SOLUTION INTRAMUSCULAR; INTRAVENOUS at 10:10

## 2020-10-16 RX ADMIN — FENTANYL CITRATE 50 MCG: 50 INJECTION, SOLUTION INTRAMUSCULAR; INTRAVENOUS at 07:10

## 2020-10-16 RX ADMIN — SODIUM CHLORIDE, SODIUM LACTATE, POTASSIUM CHLORIDE, AND CALCIUM CHLORIDE: 600; 310; 30; 20 INJECTION, SOLUTION INTRAVENOUS at 09:10

## 2020-10-16 RX ADMIN — FAMOTIDINE 20 MG: 20 TABLET, FILM COATED ORAL at 05:10

## 2020-10-16 RX ADMIN — CEFAZOLIN SODIUM 2 G: 2 SOLUTION INTRAVENOUS at 06:10

## 2020-10-16 RX ADMIN — SODIUM CHLORIDE, SODIUM LACTATE, POTASSIUM CHLORIDE, AND CALCIUM CHLORIDE: 600; 310; 30; 20 INJECTION, SOLUTION INTRAVENOUS at 06:10

## 2020-10-16 RX ADMIN — GLYCOPYRROLATE 0.2 MG: 0.2 INJECTION, SOLUTION INTRAMUSCULAR; INTRAVITREAL at 06:10

## 2020-10-16 RX ADMIN — SUCCINYLCHOLINE CHLORIDE 120 MG: 20 INJECTION, SOLUTION INTRAMUSCULAR; INTRAVENOUS at 06:10

## 2020-10-16 RX ADMIN — DOCUSATE SODIUM 50 MG AND SENNOSIDES 8.6 MG 1 TABLET: 8.6; 5 TABLET, FILM COATED ORAL at 12:10

## 2020-10-16 RX ADMIN — GABAPENTIN 300 MG: 300 CAPSULE ORAL at 10:10

## 2020-10-16 RX ADMIN — Medication 25 MG: at 06:10

## 2020-10-16 RX ADMIN — ACETAMINOPHEN 1000 MG: 500 TABLET, FILM COATED ORAL at 05:10

## 2020-10-16 RX ADMIN — ONDANSETRON 4 MG: 2 INJECTION INTRAMUSCULAR; INTRAVENOUS at 12:10

## 2020-10-16 RX ADMIN — DEXAMETHASONE SODIUM PHOSPHATE 8 MG: 4 INJECTION, SOLUTION INTRAMUSCULAR; INTRAVENOUS at 06:10

## 2020-10-16 RX ADMIN — ONDANSETRON 8 MG: 8 TABLET, ORALLY DISINTEGRATING ORAL at 05:10

## 2020-10-16 RX ADMIN — LIDOCAINE HYDROCHLORIDE 50 MG: 20 INJECTION, SOLUTION INTRAVENOUS at 06:10

## 2020-10-16 RX ADMIN — PROPOFOL 180 MG: 10 INJECTION, EMULSION INTRAVENOUS at 06:10

## 2020-10-16 RX ADMIN — FENTANYL CITRATE 50 MCG: 50 INJECTION, SOLUTION INTRAMUSCULAR; INTRAVENOUS at 06:10

## 2020-10-16 RX ADMIN — SCOLOPAMINE TRANSDERMAL SYSTEM 1 PATCH: 1 PATCH, EXTENDED RELEASE TRANSDERMAL at 10:10

## 2020-10-16 RX ADMIN — ROCURONIUM BROMIDE 5 MG: 10 INJECTION, SOLUTION INTRAVENOUS at 06:10

## 2020-10-16 RX ADMIN — GLYCOPYRROLATE 0.2 MG: 0.2 INJECTION, SOLUTION INTRAMUSCULAR; INTRAVITREAL at 07:10

## 2020-10-16 RX ADMIN — CELECOXIB 400 MG: 200 CAPSULE ORAL at 05:10

## 2020-10-16 RX ADMIN — FENTANYL CITRATE 50 MCG: 50 INJECTION, SOLUTION INTRAMUSCULAR; INTRAVENOUS at 11:10

## 2020-10-16 RX ADMIN — OXYCODONE 5 MG: 5 TABLET ORAL at 12:10

## 2020-10-16 NOTE — TRANSFER OF CARE
"Anesthesia Transfer of Care Note    Patient: Dani Gastelum JrZac    Procedure(s) Performed: Procedure(s) (LRB):  DISSECTION, NECK Central (Left)  THYROIDECTOMY- total (N/A)    Patient location: PACU    Anesthesia Type: general    Transport from OR: Transported from OR on 2-3 L/min O2 by NC with adequate spontaneous ventilation    Post pain: adequate analgesia    Post assessment: no apparent anesthetic complications and tolerated procedure well    Post vital signs: stable    Level of consciousness: awake and alert    Nausea/Vomiting: no nausea/vomiting    Complications: none    Transfer of care protocol handoff checklist not completed for medical reasons      Last vitals:   Visit Vitals  BP (!) 153/78 (BP Location: Right arm, Patient Position: Sitting)   Pulse 64   Temp 36.3 °C (97.4 °F) (Skin)   Resp 16   Ht 5' 11" (1.803 m)   Wt 97.1 kg (214 lb)   SpO2 95%   BMI 29.85 kg/m²     "

## 2020-10-16 NOTE — ANESTHESIA POSTPROCEDURE EVALUATION
Anesthesia Post Evaluation    Patient: Dani Gastelum JrZac    Procedure(s) Performed: Procedure(s) (LRB):  DISSECTION, NECK Central (Left)  THYROIDECTOMY- total (N/A)    Final Anesthesia Type: general    Patient location during evaluation: PACU  Patient participation: Yes- Able to Participate  Level of consciousness: awake and alert  Post-procedure vital signs: reviewed and stable  Pain management: adequate  Airway patency: patent    PONV status at discharge: No PONV  Anesthetic complications: no      Cardiovascular status: blood pressure returned to baseline  Respiratory status: unassisted and room air  Hydration status: euvolemic  Follow-up not needed.          Vitals Value Taken Time   /78 10/16/20 1318   Temp  10/16/20 1321   Pulse 81 10/16/20 1320   Resp 18 10/16/20 1251   SpO2 96 % 10/16/20 1320   Vitals shown include unvalidated device data.      No case tracking events are documented in the log.      Pain/Lake Score: Pain Rating Prior to Med Admin: 4 (10/16/2020 12:51 PM)  Lake Score: 10 (10/16/2020 12:15 PM)

## 2020-10-16 NOTE — OR NURSING
The patient complained of having the urge to urinate but not being able to urinate, he also stated, he has experienced an enlarge prostate in the past post surgery, I informed Dr. Flores, and new orders noted

## 2020-10-16 NOTE — DISCHARGE INSTRUCTIONS
Recovering after Endocrine Surgery    Type of Procedure: Thyroidectomy    Postoperative clinic appointment date: One week after surgery    Activity:  You may resume normal daily activities upon discharge.  This includes walking and climbing stairs.  Avoid heavy lifting and vigorous exercise for approximately 2 weeks.    Showering:  You may resume normal showering and bathing approximately 2 days after surgery.  Your incision does not require special care and it may get wet.    Incision:  Your neck incision is closed with absorbable sutures under the skin.  You will not need to have any stitches removed.      Pain:  After surgery you may experience pain at the incision, generalized neck pain, or a sore throat.  You will be given a prescription for pain relief.  Most patients will still have discomfort 2-3 days after surgery.  Many times, over the counter pain medications, such as Extra Strength Tylenol, are effective.    Driving:  Use your judgment in resuming to drive.  Avoid driving if you are still experiencing neck pain and are using prescription pain medications.      Return to Work:  Recovery after surgery depends on the individual.  Most patients can expect to return to work approximately 1-2 weeks after surgery.    Diet:  You may resume your normal diet after surgery without any restrictions.    Constipation:  Anesthesia and pain medication can cause a decrease in bowel motility.  If you experience constipation postoperatively, you may take over the counter laxatives such as Milk of Magnesia.    Calcium Supplements:  Calcium tablets may be recommended after your surgery.  Most often this is to promote bone health and prevent low blood calcium during recovery.  Numbness and tingling in your fingertips or around your mouth may be experienced when calcium levels are low.  If tingling or numbness occurs, call you doctor.  You may chew up several extra tablets to relieve symptoms from low calcium.  Tums may be  substituted for calcium tablets.    Medications:  You may resume taking medication as instructed by your MD at discharge from the hospital.     Questions/Concerns:  If you have any questions or concerns please call your doctor's office: Una Lobato MD,  [phone] or after hours call [phone] and ask for the General Surgery Resident on call.

## 2020-10-16 NOTE — INTERVAL H&P NOTE
The patient has been examined and the H&P has been reviewed:    I concur with the findings and no changes have occurred since H&P was written.    Surgery risks, benefits and alternative options discussed and understood by patient/family.          Active Hospital Problems    Diagnosis  POA    Thyroid cancer [C73]  Yes      Resolved Hospital Problems   No resolved problems to display.     Case discussed with the patient and he has elected to proceed with a total thyroidectomy upfront.

## 2020-10-16 NOTE — BRIEF OP NOTE
[facility]  Brief Operative Note    SUMMARY     Surgery Date: 10/16/2020     Surgeon(s) and Role:     * Una Lobato MD - Primary    Assisting Surgeon: RUBA Christiansen MD - Resident    Pre-op Diagnosis:  Thyroid cancer [C73]    Post-op Diagnosis:  Post-Op Diagnosis Codes:     * Thyroid cancer [C73]    Procedure(s) (LRB):  DISSECTION, NECK Central (Left)  THYROIDECTOMY- total (N/A)    Anesthesia: General    Description of Procedure: Total thyroidectomy, left central lymph node dissection     Description of the findings of the procedure: Normal appearing thyroid with small tumor identified, normal appearing pretracheal and left paratracheal nodes    Estimated Blood Loss: 20 cc    Estimated Blood Loss has been documented.         Specimens:   1) Total thyroid (stitch marks right superior pole)  2) Left central neck contents    [id number]    Pravin Christiansen MD  Surgery, PGYIII  [phone]

## 2020-10-16 NOTE — ANESTHESIA PROCEDURE NOTES
Intubation  Performed by: William Morel Jr. CRNA  Authorized by: Bernadette Hedrick MD     Intubation:     Induction:  Intravenous    Intubated:  Postinduction    Mask Ventilation:  Easy mask    Attempts:  1    Attempted By:  CRNA    Method of Intubation:  Video laryngoscopy    Blade:  Coleman 3    Laryngeal View Grade: Grade I - full view of chords      Difficult Airway Encountered?: No      Complications:  None    Airway Device:  EMG ETT (NIMS)    Airway Device Size:  8.0    Style/Cuff Inflation:  Cuffed    Tube secured:  23    Secured at:  The lips    Placement Verified By:  Capnometry    Complicating Factors:  None

## 2020-10-16 NOTE — OR NURSING
The patient's bladder was scanned and 648cc of urine noted, as per MD order a straight cath was performed and the amount of urine collected 625cc

## 2020-10-16 NOTE — OR NURSING
"The patient is lying on the stretcher AAOx4, Afebrile, Sats 96-98% on RA, pain reassessed on a scale of 0-10, post PRN pain medication and the patient stated "2". I called and text the wife with several updates throughout the patient's care. I am awaiting a room to become clean before the patient can be transferred over to [address] [place] [address].   "

## 2020-10-17 VITALS
HEART RATE: 66 BPM | RESPIRATION RATE: 18 BRPM | OXYGEN SATURATION: 92 % | HEIGHT: 71 IN | TEMPERATURE: 98 F | BODY MASS INDEX: 29.96 KG/M2 | SYSTOLIC BLOOD PRESSURE: 154 MMHG | WEIGHT: 214 LBS | DIASTOLIC BLOOD PRESSURE: 73 MMHG

## 2020-10-17 PROCEDURE — 25000003 PHARM REV CODE 250: Performed by: SURGERY

## 2020-10-17 PROCEDURE — 63600175 PHARM REV CODE 636 W HCPCS: Performed by: SURGERY

## 2020-10-17 RX ORDER — DEXAMETHASONE SODIUM PHOSPHATE 4 MG/ML
4 INJECTION, SOLUTION INTRA-ARTICULAR; INTRALESIONAL; INTRAMUSCULAR; INTRAVENOUS; SOFT TISSUE ONCE
Status: COMPLETED | OUTPATIENT
Start: 2020-10-17 | End: 2020-10-17

## 2020-10-17 RX ADMIN — DEXAMETHASONE SODIUM PHOSPHATE 4 MG: 4 INJECTION, SOLUTION INTRA-ARTICULAR; INTRALESIONAL; INTRAMUSCULAR; INTRAVENOUS; SOFT TISSUE at 05:10

## 2020-10-17 RX ADMIN — LEVOTHYROXINE SODIUM 150 MCG: 75 TABLET ORAL at 05:10

## 2020-10-17 RX ADMIN — GABAPENTIN 300 MG: 300 CAPSULE ORAL at 09:10

## 2020-10-17 RX ADMIN — METHOCARBAMOL TABLETS 500 MG: 500 TABLET, COATED ORAL at 09:10

## 2020-10-17 RX ADMIN — DOCUSATE SODIUM 50 MG AND SENNOSIDES 8.6 MG 1 TABLET: 8.6; 5 TABLET, FILM COATED ORAL at 09:10

## 2020-10-17 NOTE — PLAN OF CARE
Pt lying in bed in supine position, HOB elevated, skin warm and dry to touch, respirations even and unlabored.  Pt remain free from fall and injury throughout shift.   New orders implemented this shift.  VSS.  On RA.  Pt ambulates to bathroom independently.  No BMs this shift.  Denies any pain.  Plan of care reviewed.  Questions answered.  No complaints at this time.  Call light in reach, bed in low locked position, TEDs on, IS at bedside, side rails up x2, and nonskid socks on.  Purposeful rounding performed.  Safety maintained.  Will continue to monitor until end of shift.

## 2020-10-17 NOTE — PLAN OF CARE
Pt free of any injury or falls, VSS, skin intact. Scheduled and PRN meds given as ordered or when requested. Demonstrated ability to use call light when needed. Bed locked in lowest position. Care plan reviewed w/ patient and education given. Pt is not demonstrating any overt S/S of pain or distress at this time. Pt. Being discharged home per MD orders. VSS, pt afebrile and no c/o pain at this time. Reviewed discharge instruction, follow-up instructions, and med with pt/brother, Earl, Removed PIV access. Dry gauze/ tape placed to site, CDI. Ride to be by family upon discharge and will transport per W/C.

## 2020-10-17 NOTE — PROGRESS NOTES
[facility]  General Surgery  Progress Note    Subjective:     History of Present Illness:  No notes on file    Post-Op Info:  Procedure(s) (LRB):  DISSECTION, NECK Central (Left)  THYROIDECTOMY- total (N/A)   1 Day Post-Op     Interval History:     Nausea upon arrival to the floor. Resolved with scop patch.  Tolerating CLD, no issues swallowing.  No hoarseness.  Pain controlled.  Ambulating and urinating on his own.    Medications:  Continuous Infusions:  Scheduled Meds:   gabapentin  300 mg Oral TID    levothyroxine  150 mcg Oral Before breakfast    losartan  50 mg Oral QHS    methocarbamoL  500 mg Oral QID    senna-docusate 8.6-50 mg  1 tablet Oral BID     PRN Meds:ceFAZolin (ANCEF) IVPB, hydrOXYzine HCL, ondansetron, oxyCODONE, oxyCODONE, promethazine (PHENERGAN) IVPB, sodium chloride 0.9%, zolpidem     Review of patient's allergies indicates:  No Known Allergies  Objective:     Vital Signs (Most Recent):  Temp: 98 °F (36.7 °C) (10/17/20 0749)  Pulse: 66 (10/17/20 0749)  Resp: 18 (10/17/20 0749)  BP: (!) 154/73 (10/17/20 0749)  SpO2: (!) 92 % (10/17/20 0749) Vital Signs (24h Range):  Temp:  [97.6 °F (36.4 °C)-98.4 °F (36.9 °C)] 98 °F (36.7 °C)  Pulse:  [57-92] 66  Resp:  [16-20] 18  SpO2:  [92 %-99 %] 92 %  BP: (135-179)/(69-93) 154/73     Weight: 97.1 kg (214 lb)  Body mass index is 29.85 kg/m².    Intake/Output - Last 3 Shifts       10/15 0700 - 10/16 0659 10/16 0700 - 10/17 0659 10/17 0700 - 10/18 0659    I.V. (mL/kg) 300 (3.1) 1000 (10.3)     Total Intake(mL/kg) 300 (3.1) 1000 (10.3)     Urine (mL/kg/hr)  2325 (1)     Total Output  2325     Net +300 -1325                  Physical Exam  Vitals signs and nursing note reviewed.   Constitutional:       Appearance: Normal appearance. He is well-developed.   Eyes:      Extraocular Movements: Extraocular movements intact.      Conjunctiva/sclera: Conjunctivae normal.   Neck:      Musculoskeletal: Normal range of motion and neck supple.  Muscular tenderness (appropriate post op) present.      Comments: Lower midline incision clean, dry, no ecchymosis or swelling  Cardiovascular:      Rate and Rhythm: Normal rate and regular rhythm.   Pulmonary:      Effort: Pulmonary effort is normal. No respiratory distress.   Musculoskeletal: Normal range of motion.      Right lower leg: No edema.      Left lower leg: No edema.   Skin:     General: Skin is warm and dry.   Neurological:      General: No focal deficit present.      Mental Status: He is alert and oriented to person, place, and time.   Psychiatric:         Behavior: Behavior normal.         Thought Content: Thought content normal.         Significant Labs:  PTH: normal    Significant Diagnostics:  I have reviewed all pertinent imaging results/findings within the past 24 hours.    Assessment/Plan:     * Thyroid cancer  64 yo M s/p total thyroidectomy for thyroid papillary carcinoma.    Regular diet  PO pain regimen  PRN nausea meds, scop patch  Restart home meds  Synthroid 150 mcg  D/c mIVF  OOB, ambulate    Dispo: D/c today        Pravin Christiansen MD  General Surgery  [facility]

## 2020-10-17 NOTE — NURSING
Pt admitted from PACU.vs stable. Anterior neck incisio dry and intact c dermabond and no dressing. Vs stable.  Alert . Voided 400cc clear yellow urine. Wife at bedside. C/o of nausea and zofran given po. Given clear liquid due to nausea. Vomited  stomach contents at 1900 and states feel better. Denies pain. Report given to Crispin TONY

## 2020-10-17 NOTE — DISCHARGE SUMMARY
[facility]  General Surgery  Discharge Summary      Patient Name: Dani Gastelum Jr.  MRN: [id number]  Admission Date: 10/16/2020  Hospital Length of Stay: 0 days  Discharge Date and Time:  10/17/2020 8:05 AM  Attending Physician: Una Lobato MD   Discharging Provider: Pravin Christiansen MD  Primary Care Provider: Frandy Hill MD     HPI:     64 y.o. male seen at the request of Dr. Augie Xiao and Dr. Montalvo today in the Endocrine Surgery Clinic for evaluation of thyroid carcinoma. His history dates back to 12/2019 when patient was found to have thyroid nodule(incidental) on imaging of neck(MRI cervical spine). Subsequent evaluation included referral to an endocrinologist, neck ultrasound and fine needle aspiration.  At that point patient was diagnosed with papillary thyroid cancer.  He has seen Dr. Hein in total thyroidectomy was recommended.  Patient sought 2nd opinion as he desires thyroid lobectomy if at all possible.    Dani Gastelum Jr. complains of no new symptoms. The patient denies hot/cold intolerance, fatigue, unexplained weight changes, difficulty with swallowing, difficulty with shortness of breath especially with postural changes, change in voice quality and growth of thyroid nodule over time. He does not have a history of head and neck radiation therapy. He does not have a family history of thyroid disease. He does not have a family history of endocrinopathies. He is not taking thyroid hormone supplementation. He has not undergone radioactive iodine treatment.    Procedure(s) (LRB):  DISSECTION, NECK Central (Left)  THYROIDECTOMY- total (N/A)     Hospital Course: Patient presented for partial thyroidectomy (lobectomy) but decided to undergo total thyroidectomy for papillary carcinoma. He tolerated the procedure well. Post operatively he stayed overnight. Had some issues with nausea that were solved with scopolamine patch. Morning of POD1 he was tolerating CLD,  urinating, ambulating, and without any alarm symptoms. He was discharged with multimodal pain medication regimen and synthroid 150 mcg.    Consults:     Significant Diagnostic Studies: see full EMR, post op PTH normal range    Pending Diagnostic Studies:     Procedure Component Value Units Date/Time    Specimen to Pathology, Surgery Thyroid, Parathyroid, and Adrenals [[id number]] Collected: 10/16/20 1118    Order Status: Sent Lab Status: In process Updated: 10/16/20 1420        Final Active Diagnoses:    Diagnosis Date Noted POA    PRINCIPAL PROBLEM:  Thyroid cancer [C73] 04/01/2020 Yes      Problems Resolved During this Admission:      Discharged Condition: good    Disposition: Home or Self Care    Follow Up:  Follow-up Information     Una Lobato MD In 1 week.    Specialty: General Surgery  Contact information:  [address]  [facility] [address]                 Patient Instructions:      Diet general     Ice to affected area   Order Comments: 30 minutes on and then 30 minutes off. Repeat     Other restrictions (specify):   Order Comments: May shower immediately.  NO baths or soaks.  No driving while using narcotic pain medication.     Call MD for:  temperature >100.4     Call MD for:  persistent nausea and vomiting     Call MD for:  severe uncontrolled pain     Call MD for:  difficulty breathing, headache or visual disturbances     Call MD for:  redness, tenderness, or signs of infection (pain, swelling, redness, odor or green/yellow discharge around incision site)     Call MD for:  hives     Call MD for:  persistent dizziness or light-headedness     Call MD for:  extreme fatigue     Call MD for:   Order Comments: If you have numbness and/or tingling around the face, lips, fingertips, toes take four(4) 500mg tablets of calcium carbonate (Tums).  The symptoms should go away in 15-30 minutes.   If the symptoms persist at 30 minutes you can repeat this.  If they still don't go away, call the physician.      No dressing needed     Medications:  Reconciled Home Medications:      Medication List      START taking these medications    levothyroxine 150 MCG tablet  Commonly known as: SYNTHROID  Take 1 tablet (150 mcg total) by mouth once daily.     senna-docusate 8.6-50 mg 8.6-50 mg per tablet  Commonly known as: PERICOLACE  Take 1 tablet by mouth 2 (two) times daily.        CONTINUE taking these medications    aspirin 81 MG Chew  Take 81 mg by mouth once daily.     CENTRUM ORAL  Take by mouth once daily.     co-enzyme Q-10 30 mg capsule  Take 30 mg by mouth once daily.     fish oil-omega-3 fatty acids 300-1,000 mg capsule  Take 2 g by mouth once daily.     fluticasone propionate 50 mcg/actuation nasal spray  Commonly known as: FLONASE  SPRAY TWICE IN NOSTRIL(S) DAILY AS NEEDED FOR ALLERGIC RHINITIS     glucosamine-chondroitin 500-400 mg tablet  Take 1 tablet by mouth 3 (three) times daily. Pt taking one pill once a day.     latanoprost 0.005 % ophthalmic solution  INT 1 GTT IN OU QD HS     losartan 50 MG tablet  Commonly known as: COZAAR  TAKE 1 TABLET BY MOUTH EVERY DAY     ondansetron 4 MG Tbdl  Commonly known as: ZOFRAN-ODT  Take 4 mg by mouth once.     * pravastatin 40 MG tablet  Commonly known as: PRAVACHOL  TAKE 1 TABLET BY MOUTH EVERY DAY     * pravastatin 40 MG tablet  Commonly known as: PRAVACHOL  TAKE ONE TABLET BY MOUTH EVERY DAY     VOLTAREN 1 % Gel  Generic drug: diclofenac sodium         * This list has 2 medication(s) that are the same as other medications prescribed for you. Read the directions carefully, and ask your doctor or other care provider to review them with you.                Pravin Christiansen MD  General Surgery  [facility]

## 2020-10-17 NOTE — ASSESSMENT & PLAN NOTE
64 yo M s/p total thyroidectomy for thyroid papillary carcinoma.    Regular diet  PO pain regimen  PRN nausea meds, scop patch  Restart home meds  Synthroid 150 mcg  D/c mIVF  OOB, ambulate    Dispo: D/c today

## 2020-10-17 NOTE — SUBJECTIVE & OBJECTIVE
Interval History:     Nausea upon arrival to the floor. Resolved with scop patch.  Tolerating CLD, no issues swallowing.  No hoarseness.  Pain controlled.  Ambulating and urinating on his own.    Medications:  Continuous Infusions:  Scheduled Meds:   gabapentin  300 mg Oral TID    levothyroxine  150 mcg Oral Before breakfast    losartan  50 mg Oral QHS    methocarbamoL  500 mg Oral QID    senna-docusate 8.6-50 mg  1 tablet Oral BID     PRN Meds:ceFAZolin (ANCEF) IVPB, hydrOXYzine HCL, ondansetron, oxyCODONE, oxyCODONE, promethazine (PHENERGAN) IVPB, sodium chloride 0.9%, zolpidem     Review of patient's allergies indicates:  No Known Allergies  Objective:     Vital Signs (Most Recent):  Temp: 98 °F (36.7 °C) (10/17/20 0749)  Pulse: 66 (10/17/20 0749)  Resp: 18 (10/17/20 0749)  BP: (!) 154/73 (10/17/20 0749)  SpO2: (!) 92 % (10/17/20 0749) Vital Signs (24h Range):  Temp:  [97.6 °F (36.4 °C)-98.4 °F (36.9 °C)] 98 °F (36.7 °C)  Pulse:  [57-92] 66  Resp:  [16-20] 18  SpO2:  [92 %-99 %] 92 %  BP: (135-179)/(69-93) 154/73     Weight: 97.1 kg (214 lb)  Body mass index is 29.85 kg/m².    Intake/Output - Last 3 Shifts       10/15 0700 - 10/16 0659 10/16 0700 - 10/17 0659 10/17 0700 - 10/18 0659    I.V. (mL/kg) 300 (3.1) 1000 (10.3)     Total Intake(mL/kg) 300 (3.1) 1000 (10.3)     Urine (mL/kg/hr)  2325 (1)     Total Output  2325     Net +300 -1325                  Physical Exam  Vitals signs and nursing note reviewed.   Constitutional:       Appearance: Normal appearance. He is well-developed.   Eyes:      Extraocular Movements: Extraocular movements intact.      Conjunctiva/sclera: Conjunctivae normal.   Neck:      Musculoskeletal: Normal range of motion and neck supple. Muscular tenderness (appropriate post op) present.      Comments: Lower midline incision clean, dry, no ecchymosis or swelling  Cardiovascular:      Rate and Rhythm: Normal rate and regular rhythm.   Pulmonary:      Effort: Pulmonary effort is normal.  No respiratory distress.   Musculoskeletal: Normal range of motion.      Right lower leg: No edema.      Left lower leg: No edema.   Skin:     General: Skin is warm and dry.   Neurological:      General: No focal deficit present.      Mental Status: He is alert and oriented to person, place, and time.   Psychiatric:         Behavior: Behavior normal.         Thought Content: Thought content normal.         Significant Labs:  PTH: normal    Significant Diagnostics:  I have reviewed all pertinent imaging results/findings within the past 24 hours.

## 2020-10-20 NOTE — OP NOTE
[facility]  Endocrine Surgery  Operative Report    SUMMARY     Date of Procedure: 10/16/2020     Procedure: Procedure(s) (LRB):  DISSECTION, NECK Central (Left)  THYROIDECTOMY- total (N/A)     Indications: This patient presents with a  nodule on the left side of the neck within the thyroid. Fine needle aspiration cytology revealed findings consistent with a papillary carcinoma. The patient now presents for a thyroid lobectomy and possible total thyroidectomy and central neck dissection.     Surgeon(s) and Role:     * Una Lobato MD - Primary    Assisting Surgeon: RUBA Christiansen MD - Resident    Pre-Operative Diagnosis: Thyroid cancer [C73]    Post-Operative Diagnosis: Thyroid cancer [C73]    Anesthesia: General    Intraoperative Findings:     1.  A nodule was found within the thyroid lobe, located in the lower pole.  This measured approximately 2 cm in diameter and mobile from surrounding structures, and there was no adenopathy noted upon exploration of the neck.   2. The bilateral recurrent laryngeal nerve was identified.  Function was verified using the NIMS system.  3. Parathyroid tissue was identified and preserved with a viable blood supply.       Description of the Findings of the Procedure:    The patient was seen in the Holding Room. The risks, benefits, complications, treatment options, and expected outcomes were discussed with the patient. The possibilities of reaction to medication, pulmonary aspiration, perforation of viscus, bleeding, recurrent infection, finding a normal thyroid, recurrently laryngeal nerve damage, the need for additional procedures, failure to diagnose a condition, and creating a complication requiring transfusion or operation were discussed with the patient. The patient concurred with the proposed plan, giving informed consent.  The site of surgery properly noted/marked. The patient was taken to Operating Room, identified as Dani Gastelum Jr. and the procedure verified  as Thyroidectomy. A Time Out was held and the above information confirmed.    The patient was placed supine after induction of a general anesthetic. The neck was extended and prepped and draped in standard fashion. An 8 cm transverse cervical incision was created above the sternal notch within a natural skin fold. Dissection was carried down through the platysma layer. Once this was completed, sub-platysmal flaps were raised superiorly to the thyroid cartilage and inferiorly to the sternal notch. The strap muscles were identified and divided at the midline. Sharp and blunt dissection were used to mobilize the left thyroid lobe in a medial direction. A nodule was found within the thyroid lobe, located in the lower pole.  It was firm to the touch though no calcifications were noted. This measured approximately 2 cm in diameter and mobile from surrounding structures, and there was no adenopathy noted upon exploration of the neck. Dissection continued posteriorly to expose the tracheoesophageal groove and left carotid artery. The left thyroid lobe was mobilized further and the superior and inferior pole vessels were divided with silk ties, clips and the bipolar cautery. The middle thyroid vein was similarly divided. The left recurrent laryngeal nerve was identified and preserved. Function was verified using the NIMS.The left inferior parathyroid gland was not identified, and the left superior parathyroid was noted dorsal to the nerve and left in situ. Small vessels were likewise divided. The gland was rotated in a medial direction and taken off the trachea using the bipolar cautery. The isthmus was removed off the thyroid cartilage also using the bipolar cautery.     Attention was then give to the right lobe.  Sharp and blunt dissection were again used to mobilize the right thyroid lobe in a medial direction. Dissection continued posteriorly to expose the tracheoesophageal groove and right carotid artery. Similar to the  left, the right thyroid lobe was mobilized further and the superior and inferior pole vessels were divided with silk ties, clips and the bipolar cautery. The middle thyroid vein was similarly divided. The right recurrent laryngeal nerve was identified and preserved. Function was verified using the NIMS. Small vessels were likewise divided. The right superior and inferior parathyroid glands were identified and preserved in situ. The gland was rotated in a medial direction and taken off the trachea using the bipolar cautery. A very small remnant of thyroid tissue was left in situ(approximately 10mg) to preserve the nerve insertion based on the course of the nerves bilaterally. The specimen was submitted to pathology for permanent evaluation. A suture was placed for orientation purposes (stitch marks the right superior pole).     Inspection of the central neck did not reveal any significant adenopathy or grossly palpable disease worrisome of metastatic spread. Based on these findings and the known history of papillary thyroid cancer decision was made to perform a prophylatic left central neck dissection. The plane anterior to the carotid was opened. The plane anterior to the left carotid artery was developed using electocautery. The tissues immediately underneath the strap muscles and anterior to the carotid artery were removed. The tissues in the paratracheal region around the RLN were also removed. This dissection was continued inferiorly towards level 7. The thyrothymic ligament was incised and partial transcervical thymectomy was performed. The dissection was continue across the midline to the anterior trachea and the tissue here was also removed. The specimen was named central neck contents and sent for permanent section and included the left inferior parathyroid gland.     The wound was irrigated and inspected carefully. Multiple Valsalva maneuvers were performed at 30 cm of water and additional hemostasis was  achieved as necessary with focal application of bipolar cautery. This was augmented with Fibrillar which was placed in the thyroid fossa. The parathyroid tissue was found to be viable and the bilateral recurrent laryngeal nerve were left intact in their anatomic locations. Function was again verified using the NIMS prior to closure. Then 0.5% Marcaine was placed into the strap muscles and subcutaneous tissues for post-op analgesia. The strap muscles were then closed with interrupted 3-0 Vicryl suture.  The platysma was closed with interrupted 3-0 Vicryl suture, and the skin incision was closed with a 5-0 Monocryl subcuticular knot-less closure. Sterile dressings were was applied across the incision.    Instrument, sponge, and needle counts were correct prior to closure and at the conclusion of the case.     Significant Surgical Tasks Conducted by the Assistant(s), if Applicable: none    Complications: No    Total IV Fluids: see anesthesia record    Estimated Blood Loss (EBL): Minimal           Drains: none    Implants: none    Specimens:   1) Total thyroid (stitch marks right superior pole)  2) Left central neck contents            Condition: stable    Disposition: PACU - hemodynamically stable.    Attestation: I was present and scrubbed for the entire procedure.

## 2020-10-21 LAB
FINAL PATHOLOGIC DIAGNOSIS: NORMAL
GROSS: NORMAL
Lab: NORMAL
MICROSCOPIC EXAM: NORMAL

## 2020-10-26 ENCOUNTER — OFFICE VISIT (OUTPATIENT)
Dept: SURGERY | Facility: CLINIC | Age: 65
End: 2020-10-26
Attending: SURGERY
Payer: COMMERCIAL

## 2020-10-26 VITALS
WEIGHT: 214 LBS | SYSTOLIC BLOOD PRESSURE: 143 MMHG | HEART RATE: 73 BPM | BODY MASS INDEX: 29.96 KG/M2 | DIASTOLIC BLOOD PRESSURE: 70 MMHG | HEIGHT: 71 IN

## 2020-10-26 DIAGNOSIS — C73 THYROID CANCER: Primary | ICD-10-CM

## 2020-10-26 DIAGNOSIS — Z09 POSTOP CHECK: ICD-10-CM

## 2020-10-26 PROCEDURE — 99024 POSTOP FOLLOW-UP VISIT: CPT | Mod: S$GLB,,, | Performed by: SURGERY

## 2020-10-26 PROCEDURE — 99024 PR POST-OP FOLLOW-UP VISIT: ICD-10-PCS | Mod: S$GLB,,, | Performed by: SURGERY

## 2020-10-26 RX ORDER — LEVOTHYROXINE SODIUM 150 UG/1
150 TABLET ORAL DAILY
Qty: 30 TABLET | Refills: 1 | Status: SHIPPED | OUTPATIENT
Start: 2020-10-26 | End: 2020-12-04 | Stop reason: SDUPTHER

## 2020-10-26 NOTE — PROGRESS NOTES
"Subjective:       Dani Gastelum Jr. presents to the clinic 1 weeks following total thyroidectomy with central neck dissection. Eating a regular diet without difficulty. Bowel movements are Normal.  The patient is not having any pain.. Patient denies trouble swallowing, trouble speaking and voice changes.  He is currently on Calcium or Rocaltrol. Patient is taking Levothyroxine (150 mcg daily).       Objective:      BP (!) 143/70   Pulse 73   Ht 5' 11" (1.803 m)   Wt 97.1 kg (214 lb)   BMI 29.85 kg/m²     General:   alert, appears stated age and cooperative.   Incision:   well approximated, healing well, no signs of drainage, no erythema, no dehiscence, no swelling, no hematoma, no ecchymosis and swelling(moderate expected. Healing induration ridge).   Voice:  normal       Final Pathologic Diagnosis 1. Thyroid, total thyroidectomy:   - Papillary thyroid carcinoma, 2.5 cm (pT2)   - Margin uninvolved by carcinoma (0.1 cm to thyroid surface)   - Negative for lymphatic invasion or angioinvasion   - Negative for perineural invasion   - Adenomatoid nodules   - Biopsy site changes identified   - Two benign lymph node, negative for metastatic carcinoma (0/2)   2. Lymph nodes, designated "Left central neck contents", neck dissection:   - Fourteen benign lymph nodes, negative for metastatic carcinoma (0/14)     CAP Synoptic Checklist for Thyroid Neoplasms:     - Procedure: Total thyroidectomy with left central neck dissection     - Tumor Focality: Unifocal     - Tumor Site: Left lobe     - Tumor Size: 2.5 x 1.2 x 1.1 cm     - Histologic Type: Papillary carcinoma, classic     - Margins: Uninvolved by carcinoma (0.1 cm from inked thyroid surface)     - Angioinvasion: Not identified     - Lymphatic Invasion: Not identified     - Perineural Invasion: Not identified     - Mitotic Rate: 1 mitosis per 2 mm^2     - Extrathyroidal Extension: Not identified     - Regional Lymph Nodes:                      Number of Lymph Nodes " Involved: 0                      Number of Lymph Nodes Examined: 16                      Adrianne Levels Examined: Left central neck dissection     - Pathologic Stage Classification: pT2 pN0     - Additional Pathologic Findings: Adenomatoid nodules     - Ancillary Studies: Available at clinician request     - Clinical History: No known radiation exposure; No known family history of         thyroid cancer        Labs:  Lab Results   Component Value Date    CALCIUM 9.3 10/13/2020    CALCIUM 9.1 07/17/2020    CALCIUM 9.1 01/16/2020    TSH 0.870 01/30/2020    TSH 0.937 09/02/2016    TSH 0.972 09/02/2015    FREET4 1.07 01/30/2020    FREET4 1.14 09/04/2014    FREET4 1.01 01/12/2010    GATOUGXH25IO 51 10/13/2020    IZQXLSHU86NM 51 03/24/2015    [id number] 49 09/04/2014    PTH 36.2 10/16/2020    PHOS 3.5 10/13/2020            Assessment:     Dani Gastelum Jr. is doing well post-operatively after total thyroidectomy with left central neck dissection.      Plan:     1. Continue any current medications.  2. Wound care and scar massage discussed.  3. Pt is to increase activities as tolerated.  4. Follow up with endocrinology for the possibility for further treatment in terms of OLIVEIRA and TSH suppression.

## 2020-10-27 DIAGNOSIS — C73 PAPILLARY THYROID CARCINOMA: Primary | ICD-10-CM

## 2020-11-06 RX ORDER — PRAVASTATIN SODIUM 40 MG/1
40 TABLET ORAL DAILY
Qty: 30 TABLET | Refills: 10 | Status: SHIPPED | OUTPATIENT
Start: 2020-11-06 | End: 2021-07-28 | Stop reason: SDUPTHER

## 2020-11-18 ENCOUNTER — PATIENT OUTREACH (OUTPATIENT)
Dept: ADMINISTRATIVE | Facility: OTHER | Age: 65
End: 2020-11-18

## 2020-11-18 ENCOUNTER — PATIENT MESSAGE (OUTPATIENT)
Dept: ENDOCRINOLOGY | Facility: CLINIC | Age: 65
End: 2020-11-18

## 2020-11-18 NOTE — PROGRESS NOTES
Care Everywhere: updated  Immunization: updated, links delay   Health Maintenance: updated  Media Review: uploaded 2017 colonoscopy report to   Legacy Review:   Order placed:   Upcoming appts:

## 2020-11-19 ENCOUNTER — OFFICE VISIT (OUTPATIENT)
Dept: ENDOCRINOLOGY | Facility: CLINIC | Age: 65
End: 2020-11-19
Payer: COMMERCIAL

## 2020-11-19 VITALS
WEIGHT: 216.69 LBS | SYSTOLIC BLOOD PRESSURE: 134 MMHG | HEIGHT: 71 IN | DIASTOLIC BLOOD PRESSURE: 80 MMHG | BODY MASS INDEX: 30.34 KG/M2

## 2020-11-19 DIAGNOSIS — I10 ESSENTIAL HYPERTENSION: Chronic | ICD-10-CM

## 2020-11-19 DIAGNOSIS — C73 PAPILLARY THYROID CARCINOMA: ICD-10-CM

## 2020-11-19 DIAGNOSIS — C73 THYROID CANCER: Primary | ICD-10-CM

## 2020-11-19 DIAGNOSIS — E04.2 MULTINODULAR GOITER: ICD-10-CM

## 2020-11-19 DIAGNOSIS — E89.0 POSTOPERATIVE HYPOTHYROIDISM: ICD-10-CM

## 2020-11-19 PROCEDURE — 99999 PR PBB SHADOW E&M-EST. PATIENT-LVL IV: ICD-10-PCS | Mod: PBBFAC,,, | Performed by: INTERNAL MEDICINE

## 2020-11-19 PROCEDURE — 1157F ADVNC CARE PLAN IN RCRD: CPT | Mod: S$GLB,,, | Performed by: INTERNAL MEDICINE

## 2020-11-19 PROCEDURE — 3079F DIAST BP 80-89 MM HG: CPT | Mod: CPTII,S$GLB,, | Performed by: INTERNAL MEDICINE

## 2020-11-19 PROCEDURE — 1159F MED LIST DOCD IN RCRD: CPT | Mod: S$GLB,,, | Performed by: INTERNAL MEDICINE

## 2020-11-19 PROCEDURE — 1157F PR ADVANCE CARE PLAN OR EQUIV PRESENT IN MEDICAL RECORD: ICD-10-PCS | Mod: S$GLB,,, | Performed by: INTERNAL MEDICINE

## 2020-11-19 PROCEDURE — 99214 OFFICE O/P EST MOD 30 MIN: CPT | Mod: S$GLB,,, | Performed by: INTERNAL MEDICINE

## 2020-11-19 PROCEDURE — 3008F PR BODY MASS INDEX (BMI) DOCUMENTED: ICD-10-PCS | Mod: CPTII,S$GLB,, | Performed by: INTERNAL MEDICINE

## 2020-11-19 PROCEDURE — 3008F BODY MASS INDEX DOCD: CPT | Mod: CPTII,S$GLB,, | Performed by: INTERNAL MEDICINE

## 2020-11-19 PROCEDURE — 99214 PR OFFICE/OUTPT VISIT, EST, LEVL IV, 30-39 MIN: ICD-10-PCS | Mod: S$GLB,,, | Performed by: INTERNAL MEDICINE

## 2020-11-19 PROCEDURE — 3079F PR MOST RECENT DIASTOLIC BLOOD PRESSURE 80-89 MM HG: ICD-10-PCS | Mod: CPTII,S$GLB,, | Performed by: INTERNAL MEDICINE

## 2020-11-19 PROCEDURE — 1126F PR PAIN SEVERITY QUANTIFIED, NO PAIN PRESENT: ICD-10-PCS | Mod: S$GLB,,, | Performed by: INTERNAL MEDICINE

## 2020-11-19 PROCEDURE — 1126F AMNT PAIN NOTED NONE PRSNT: CPT | Mod: S$GLB,,, | Performed by: INTERNAL MEDICINE

## 2020-11-19 PROCEDURE — 3075F SYST BP GE 130 - 139MM HG: CPT | Mod: CPTII,S$GLB,, | Performed by: INTERNAL MEDICINE

## 2020-11-19 PROCEDURE — 99999 PR PBB SHADOW E&M-EST. PATIENT-LVL IV: CPT | Mod: PBBFAC,,, | Performed by: INTERNAL MEDICINE

## 2020-11-19 PROCEDURE — 1159F PR MEDICATION LIST DOCUMENTED IN MEDICAL RECORD: ICD-10-PCS | Mod: S$GLB,,, | Performed by: INTERNAL MEDICINE

## 2020-11-19 PROCEDURE — 3075F PR MOST RECENT SYSTOLIC BLOOD PRESS GE 130-139MM HG: ICD-10-PCS | Mod: CPTII,S$GLB,, | Performed by: INTERNAL MEDICINE

## 2020-11-19 NOTE — PROGRESS NOTES
"Subjective:      Patient ID: Dani Gastelum Jr. is a 65 y.o. male.    Chief Complaint:  Papillary thyroid carcinoma      History of Present Illness  64 YO Male w/ diagnosis of PTC, HTN, HLD and Beta thalasemia that presents to the endocrine clinic for follow up of thyroid cancer and hypothyroidism. Last visit 8/2020.     In regards to thyroid CA:    -TSH   0.8 WNL  -FT4 1.07 WNL   -Thyroid US 12/19:  MNG w/ largest nodule on Left thyroid meassuring 1.7 x 1.1 x 1.7 cm, (7) hypoechoic solid-appearing w/ blurred margins and a 1.5cm complex predominantly solid left thyroid nodule. The right thyroid nodules are sub-centimeter   -FNA  On 3/20 of Left inferior 1.7cm thyroid nodule:  Positive for Papillary thyroid Carcinoma   -No Lymph node evaluation on Thyroid US done on 12/19     S/p total thyroidectomy with left central neck dissection on 10/16/2020:  1. Thyroid, total thyroidectomy:  - Papillary thyroid carcinoma, 2.5 cm (pT2)  - Margin uninvolved by carcinoma (0.1 cm to thyroid surface)  - Negative for lymphatic invasion or angioinvasion  - Negative for perineural invasion  - Adenomatoid nodules  - Biopsy site changes identified  - Two benign lymph node, negative for metastatic carcinoma (0/2)  2. Lymph nodes, designated "Left central neck contents", neck dissection:  - Fourteen benign lymph nodes, negative for metastatic carcinoma (0/14)    Currently taking levothyroxine 150 mcg PO daily.   Has more fatigue since the surgery. BM's regular. No heat or cold intolerance. No dry skin. No hair loss. No significant weight changes.   No heart palpitations or tremor.     No overt dysphagia, but takes more effort to swallow. Has noticed some voice changes. Hasn't been able to yell or talk as loud.     Review of Systems   Constitutional: Negative for activity change, chills and fever.   Gastrointestinal: Negative for nausea and vomiting.   No CP  No SOB    Objective:   Physical Exam  Vitals signs and nursing note reviewed. "       BP Readings from Last 3 Encounters:   11/19/20 134/80   10/26/20 (!) 143/70   10/17/20 (!) 154/73     Wt Readings from Last 1 Encounters:   11/19/20 1117 98.3 kg (216 lb 11.4 oz)       Body mass index is 30.23 kg/m².    Lab Review:   No results found for: HGBA1C  Lab Results   Component Value Date    CHOL 152 07/17/2020    HDL 36 (L) 07/17/2020    LDLCALC 102.2 07/17/2020    TRIG 69 07/17/2020    CHOLHDL 23.7 07/17/2020     Lab Results   Component Value Date     10/13/2020    K 4.6 10/13/2020     10/13/2020    CO2 24 10/13/2020    GLU 86 10/13/2020    BUN 20 10/13/2020    CREATININE 1.2 10/13/2020    CALCIUM 9.3 10/13/2020    PROT 6.8 07/17/2020    ALBUMIN 3.9 10/13/2020    BILITOT 0.9 07/17/2020    ALKPHOS 42 (L) 07/17/2020    AST 19 07/17/2020    ALT 19 07/17/2020    ANIONGAP 10 10/13/2020    ESTGFRAFRICA >60 10/13/2020    EGFRNONAA >60 10/13/2020    TSH 0.870 01/30/2020         Assessment and Plan     Papillary thyroid carcinoma  --patient s/p total thyroidectomy with left CN neck dissection on 10/16/2020 for unifocal papillary thyroid cancer with 0/14 LN positive  --Stage 1, CASI low risk for recurrence  --Reviewed that he is low risk for recurrence and I would not recommend OLIVEIRA ablation as long as his post-operative Tg is in an expected range  --Will check Tg in 2 weeks  --If Tg in expected range will check baseline postoperative neck ultrasound in 6 months  --Will aim to keep TSH low normal at this time    Postoperative hypothyroidism  --patient with postoperative hypothyroidism  --Clinically feels more fatigued  --Will aim to keep TSH low normal  --Will check TSH in 2 weeks  --Continue levothyroxine 150 mcg once daily pending repeat TSH    Essential hypertension  --On single agent losartan  --Bp stable today, but reports bp has been running higher at home on his bp cuff  --Advised him to reach out to PCP if bp remains elevated at home      TSH and thyroglobulin in 2 weeks at [facility]   Follow up  in 6 months with TSH, Tg and ultrasound in 6 months      Lj Montalvo M.D. Staff Endocrinology

## 2020-11-20 PROBLEM — E04.2 MULTINODULAR GOITER: Status: RESOLVED | Noted: 2020-01-30 | Resolved: 2020-11-20

## 2020-11-20 PROBLEM — E89.0 POSTOPERATIVE HYPOTHYROIDISM: Status: ACTIVE | Noted: 2020-11-20

## 2020-11-20 NOTE — ASSESSMENT & PLAN NOTE
--patient s/p total thyroidectomy with left CN neck dissection on 10/16/2020 for unifocal papillary thyroid cancer with 0/14 LN positive  --Stage 1, CASI low risk for recurrence  --Reviewed that he is low risk for recurrence and I would not recommend OLIVEIRA ablation as long as his post-operative Tg is in an expected range  --Will check Tg in 2 weeks  --If Tg in expected range will check baseline postoperative neck ultrasound in 6 months  --Will aim to keep TSH low normal at this time

## 2020-11-20 NOTE — ASSESSMENT & PLAN NOTE
--patient with postoperative hypothyroidism  --Clinically feels more fatigued  --Will aim to keep TSH low normal  --Will check TSH in 2 weeks  --Continue levothyroxine 150 mcg once daily pending repeat TSH

## 2020-11-20 NOTE — ASSESSMENT & PLAN NOTE
--On single agent losartan  --Bp stable today, but reports bp has been running higher at home on his bp cuff  --Advised him to reach out to PCP if bp remains elevated at home

## 2020-12-03 ENCOUNTER — LAB VISIT (OUTPATIENT)
Dept: LAB | Facility: HOSPITAL | Age: 65
End: 2020-12-03
Attending: INTERNAL MEDICINE
Payer: COMMERCIAL

## 2020-12-03 DIAGNOSIS — C73 THYROID CANCER: ICD-10-CM

## 2020-12-03 LAB — TSH SERPL DL<=0.005 MIU/L-ACNC: 0.47 UIU/ML (ref 0.4–4)

## 2020-12-03 PROCEDURE — 84443 ASSAY THYROID STIM HORMONE: CPT

## 2020-12-03 PROCEDURE — 84432 ASSAY OF THYROGLOBULIN: CPT

## 2020-12-04 RX ORDER — LEVOTHYROXINE SODIUM 150 UG/1
150 TABLET ORAL DAILY
Qty: 30 TABLET | Refills: 11 | Status: SHIPPED | OUTPATIENT
Start: 2020-12-04 | End: 2021-12-16 | Stop reason: SDUPTHER

## 2020-12-07 ENCOUNTER — PATIENT MESSAGE (OUTPATIENT)
Dept: ENDOCRINOLOGY | Facility: CLINIC | Age: 65
End: 2020-12-07

## 2020-12-07 LAB
THRYOGLOBULIN INTERPRETATION: NORMAL
THYROGLOB AB SERPL-ACNC: <1.8 IU/ML
THYROGLOB SERPL-MCNC: <0.1 NG/ML

## 2021-02-10 RX ORDER — LOSARTAN POTASSIUM 50 MG/1
50 TABLET ORAL DAILY
Qty: 30 TABLET | Refills: 6 | Status: SHIPPED | OUTPATIENT
Start: 2021-02-10 | End: 2021-03-26

## 2021-03-16 ENCOUNTER — LAB VISIT (OUTPATIENT)
Dept: LAB | Facility: HOSPITAL | Age: 66
End: 2021-03-16
Attending: INTERNAL MEDICINE
Payer: COMMERCIAL

## 2021-03-16 DIAGNOSIS — E78.5 HYPERLIPIDEMIA, UNSPECIFIED HYPERLIPIDEMIA TYPE: ICD-10-CM

## 2021-03-16 DIAGNOSIS — I10 ESSENTIAL HYPERTENSION: ICD-10-CM

## 2021-03-16 DIAGNOSIS — C73 THYROID CANCER: ICD-10-CM

## 2021-03-16 LAB
BASOPHILS # BLD AUTO: 0.03 K/UL (ref 0–0.2)
BASOPHILS # BLD AUTO: 0.03 K/UL (ref 0–0.2)
BASOPHILS NFR BLD: 0.6 % (ref 0–1.9)
BASOPHILS NFR BLD: 0.6 % (ref 0–1.9)
DIFFERENTIAL METHOD: ABNORMAL
DIFFERENTIAL METHOD: ABNORMAL
EOSINOPHIL # BLD AUTO: 0.2 K/UL (ref 0–0.5)
EOSINOPHIL # BLD AUTO: 0.2 K/UL (ref 0–0.5)
EOSINOPHIL NFR BLD: 4.5 % (ref 0–8)
EOSINOPHIL NFR BLD: 4.5 % (ref 0–8)
ERYTHROCYTE [DISTWIDTH] IN BLOOD BY AUTOMATED COUNT: 14.3 % (ref 11.5–14.5)
ERYTHROCYTE [DISTWIDTH] IN BLOOD BY AUTOMATED COUNT: 14.3 % (ref 11.5–14.5)
HCT VFR BLD AUTO: 42.1 % (ref 40–54)
HCT VFR BLD AUTO: 42.1 % (ref 40–54)
HGB BLD-MCNC: 12.9 G/DL (ref 14–18)
HGB BLD-MCNC: 12.9 G/DL (ref 14–18)
IMM GRANULOCYTES # BLD AUTO: 0.01 K/UL (ref 0–0.04)
IMM GRANULOCYTES # BLD AUTO: 0.01 K/UL (ref 0–0.04)
IMM GRANULOCYTES NFR BLD AUTO: 0.2 % (ref 0–0.5)
IMM GRANULOCYTES NFR BLD AUTO: 0.2 % (ref 0–0.5)
LYMPHOCYTES # BLD AUTO: 3.1 K/UL (ref 1–4.8)
LYMPHOCYTES # BLD AUTO: 3.1 K/UL (ref 1–4.8)
LYMPHOCYTES NFR BLD: 59.7 % (ref 18–48)
LYMPHOCYTES NFR BLD: 59.7 % (ref 18–48)
MCH RBC QN AUTO: 23.5 PG (ref 27–31)
MCH RBC QN AUTO: 23.5 PG (ref 27–31)
MCHC RBC AUTO-ENTMCNC: 30.6 G/DL (ref 32–36)
MCHC RBC AUTO-ENTMCNC: 30.6 G/DL (ref 32–36)
MCV RBC AUTO: 77 FL (ref 82–98)
MCV RBC AUTO: 77 FL (ref 82–98)
MONOCYTES # BLD AUTO: 0.4 K/UL (ref 0.3–1)
MONOCYTES # BLD AUTO: 0.4 K/UL (ref 0.3–1)
MONOCYTES NFR BLD: 8.6 % (ref 4–15)
MONOCYTES NFR BLD: 8.6 % (ref 4–15)
NEUTROPHILS # BLD AUTO: 1.4 K/UL (ref 1.8–7.7)
NEUTROPHILS # BLD AUTO: 1.4 K/UL (ref 1.8–7.7)
NEUTROPHILS NFR BLD: 26.4 % (ref 38–73)
NEUTROPHILS NFR BLD: 26.4 % (ref 38–73)
NRBC BLD-RTO: 0 /100 WBC
NRBC BLD-RTO: 0 /100 WBC
PLATELET # BLD AUTO: 269 K/UL (ref 150–350)
PLATELET # BLD AUTO: 269 K/UL (ref 150–350)
PMV BLD AUTO: 9.7 FL (ref 9.2–12.9)
PMV BLD AUTO: 9.7 FL (ref 9.2–12.9)
RBC # BLD AUTO: 5.48 M/UL (ref 4.6–6.2)
RBC # BLD AUTO: 5.48 M/UL (ref 4.6–6.2)
WBC # BLD AUTO: 5.14 K/UL (ref 3.9–12.7)
WBC # BLD AUTO: 5.14 K/UL (ref 3.9–12.7)

## 2021-03-16 PROCEDURE — 80053 COMPREHEN METABOLIC PANEL: CPT | Performed by: INTERNAL MEDICINE

## 2021-03-16 PROCEDURE — 85025 COMPLETE CBC W/AUTO DIFF WBC: CPT | Performed by: INTERNAL MEDICINE

## 2021-03-16 PROCEDURE — 80061 LIPID PANEL: CPT | Performed by: INTERNAL MEDICINE

## 2021-03-16 PROCEDURE — 84443 ASSAY THYROID STIM HORMONE: CPT | Performed by: INTERNAL MEDICINE

## 2021-03-16 PROCEDURE — 36415 COLL VENOUS BLD VENIPUNCTURE: CPT | Mod: PO | Performed by: INTERNAL MEDICINE

## 2021-03-17 LAB
ALBUMIN SERPL BCP-MCNC: 3.7 G/DL (ref 3.5–5.2)
ALBUMIN SERPL BCP-MCNC: 3.7 G/DL (ref 3.5–5.2)
ALP SERPL-CCNC: 41 U/L (ref 55–135)
ALP SERPL-CCNC: 41 U/L (ref 55–135)
ALT SERPL W/O P-5'-P-CCNC: 20 U/L (ref 10–44)
ALT SERPL W/O P-5'-P-CCNC: 20 U/L (ref 10–44)
ANION GAP SERPL CALC-SCNC: 9 MMOL/L (ref 8–16)
ANION GAP SERPL CALC-SCNC: 9 MMOL/L (ref 8–16)
AST SERPL-CCNC: 21 U/L (ref 10–40)
AST SERPL-CCNC: 21 U/L (ref 10–40)
BILIRUB SERPL-MCNC: 0.5 MG/DL (ref 0.1–1)
BILIRUB SERPL-MCNC: 0.5 MG/DL (ref 0.1–1)
BUN SERPL-MCNC: 19 MG/DL (ref 8–23)
BUN SERPL-MCNC: 19 MG/DL (ref 8–23)
CALCIUM SERPL-MCNC: 8.5 MG/DL (ref 8.7–10.5)
CALCIUM SERPL-MCNC: 8.5 MG/DL (ref 8.7–10.5)
CHLORIDE SERPL-SCNC: 108 MMOL/L (ref 95–110)
CHLORIDE SERPL-SCNC: 108 MMOL/L (ref 95–110)
CHOLEST SERPL-MCNC: 155 MG/DL (ref 120–199)
CHOLEST SERPL-MCNC: 155 MG/DL (ref 120–199)
CHOLEST/HDLC SERPL: 4.2 {RATIO} (ref 2–5)
CHOLEST/HDLC SERPL: 4.2 {RATIO} (ref 2–5)
CO2 SERPL-SCNC: 25 MMOL/L (ref 23–29)
CO2 SERPL-SCNC: 25 MMOL/L (ref 23–29)
CREAT SERPL-MCNC: 1.3 MG/DL (ref 0.5–1.4)
CREAT SERPL-MCNC: 1.3 MG/DL (ref 0.5–1.4)
EST. GFR  (AFRICAN AMERICAN): >60 ML/MIN/1.73 M^2
EST. GFR  (AFRICAN AMERICAN): >60 ML/MIN/1.73 M^2
EST. GFR  (NON AFRICAN AMERICAN): 56.5 ML/MIN/1.73 M^2
EST. GFR  (NON AFRICAN AMERICAN): 56.5 ML/MIN/1.73 M^2
GLUCOSE SERPL-MCNC: 83 MG/DL (ref 70–110)
GLUCOSE SERPL-MCNC: 83 MG/DL (ref 70–110)
HDLC SERPL-MCNC: 37 MG/DL (ref 40–75)
HDLC SERPL-MCNC: 37 MG/DL (ref 40–75)
HDLC SERPL: 23.9 % (ref 20–50)
HDLC SERPL: 23.9 % (ref 20–50)
LDLC SERPL CALC-MCNC: 107.8 MG/DL (ref 63–159)
LDLC SERPL CALC-MCNC: 107.8 MG/DL (ref 63–159)
NONHDLC SERPL-MCNC: 118 MG/DL
NONHDLC SERPL-MCNC: 118 MG/DL
POTASSIUM SERPL-SCNC: 4.5 MMOL/L (ref 3.5–5.1)
POTASSIUM SERPL-SCNC: 4.5 MMOL/L (ref 3.5–5.1)
PROT SERPL-MCNC: 6.8 G/DL (ref 6–8.4)
PROT SERPL-MCNC: 6.8 G/DL (ref 6–8.4)
SODIUM SERPL-SCNC: 142 MMOL/L (ref 136–145)
SODIUM SERPL-SCNC: 142 MMOL/L (ref 136–145)
TRIGL SERPL-MCNC: 51 MG/DL (ref 30–150)
TRIGL SERPL-MCNC: 51 MG/DL (ref 30–150)
TSH SERPL DL<=0.005 MIU/L-ACNC: 0.72 UIU/ML (ref 0.4–4)

## 2021-03-26 ENCOUNTER — LAB VISIT (OUTPATIENT)
Dept: LAB | Facility: HOSPITAL | Age: 66
End: 2021-03-26
Attending: INTERNAL MEDICINE
Payer: COMMERCIAL

## 2021-03-26 ENCOUNTER — OFFICE VISIT (OUTPATIENT)
Dept: INTERNAL MEDICINE | Facility: CLINIC | Age: 66
End: 2021-03-26
Payer: COMMERCIAL

## 2021-03-26 VITALS
HEIGHT: 71 IN | WEIGHT: 217.63 LBS | DIASTOLIC BLOOD PRESSURE: 86 MMHG | BODY MASS INDEX: 30.47 KG/M2 | SYSTOLIC BLOOD PRESSURE: 124 MMHG | HEART RATE: 72 BPM | OXYGEN SATURATION: 98 %

## 2021-03-26 DIAGNOSIS — M17.0 BILATERAL PRIMARY OSTEOARTHRITIS OF KNEE: ICD-10-CM

## 2021-03-26 DIAGNOSIS — I10 ESSENTIAL HYPERTENSION: Primary | ICD-10-CM

## 2021-03-26 DIAGNOSIS — C61 PROSTATE CANCER: ICD-10-CM

## 2021-03-26 DIAGNOSIS — E89.0 POSTOPERATIVE HYPOTHYROIDISM: ICD-10-CM

## 2021-03-26 DIAGNOSIS — E78.49 OTHER HYPERLIPIDEMIA: ICD-10-CM

## 2021-03-26 DIAGNOSIS — D56.1 THALASSEMIA, BETA: ICD-10-CM

## 2021-03-26 PROCEDURE — 3008F PR BODY MASS INDEX (BMI) DOCUMENTED: ICD-10-PCS | Mod: CPTII,S$GLB,, | Performed by: INTERNAL MEDICINE

## 2021-03-26 PROCEDURE — 3079F DIAST BP 80-89 MM HG: CPT | Mod: CPTII,S$GLB,, | Performed by: INTERNAL MEDICINE

## 2021-03-26 PROCEDURE — 84153 ASSAY OF PSA TOTAL: CPT | Performed by: INTERNAL MEDICINE

## 2021-03-26 PROCEDURE — 3288F FALL RISK ASSESSMENT DOCD: CPT | Mod: CPTII,S$GLB,, | Performed by: INTERNAL MEDICINE

## 2021-03-26 PROCEDURE — 1125F PR PAIN SEVERITY QUANTIFIED, PAIN PRESENT: ICD-10-PCS | Mod: S$GLB,,, | Performed by: INTERNAL MEDICINE

## 2021-03-26 PROCEDURE — 36415 COLL VENOUS BLD VENIPUNCTURE: CPT | Mod: PO | Performed by: INTERNAL MEDICINE

## 2021-03-26 PROCEDURE — 99214 PR OFFICE/OUTPT VISIT, EST, LEVL IV, 30-39 MIN: ICD-10-PCS | Mod: S$GLB,,, | Performed by: INTERNAL MEDICINE

## 2021-03-26 PROCEDURE — 3074F SYST BP LT 130 MM HG: CPT | Mod: CPTII,S$GLB,, | Performed by: INTERNAL MEDICINE

## 2021-03-26 PROCEDURE — 1101F PR PT FALLS ASSESS DOC 0-1 FALLS W/OUT INJ PAST YR: ICD-10-PCS | Mod: CPTII,S$GLB,, | Performed by: INTERNAL MEDICINE

## 2021-03-26 PROCEDURE — 3079F PR MOST RECENT DIASTOLIC BLOOD PRESSURE 80-89 MM HG: ICD-10-PCS | Mod: CPTII,S$GLB,, | Performed by: INTERNAL MEDICINE

## 2021-03-26 PROCEDURE — 3288F PR FALLS RISK ASSESSMENT DOCUMENTED: ICD-10-PCS | Mod: CPTII,S$GLB,, | Performed by: INTERNAL MEDICINE

## 2021-03-26 PROCEDURE — 1101F PT FALLS ASSESS-DOCD LE1/YR: CPT | Mod: CPTII,S$GLB,, | Performed by: INTERNAL MEDICINE

## 2021-03-26 PROCEDURE — 3008F BODY MASS INDEX DOCD: CPT | Mod: CPTII,S$GLB,, | Performed by: INTERNAL MEDICINE

## 2021-03-26 PROCEDURE — 1159F MED LIST DOCD IN RCRD: CPT | Mod: S$GLB,,, | Performed by: INTERNAL MEDICINE

## 2021-03-26 PROCEDURE — 99999 PR PBB SHADOW E&M-EST. PATIENT-LVL IV: CPT | Mod: PBBFAC,,, | Performed by: INTERNAL MEDICINE

## 2021-03-26 PROCEDURE — 3074F PR MOST RECENT SYSTOLIC BLOOD PRESSURE < 130 MM HG: ICD-10-PCS | Mod: CPTII,S$GLB,, | Performed by: INTERNAL MEDICINE

## 2021-03-26 PROCEDURE — 1157F PR ADVANCE CARE PLAN OR EQUIV PRESENT IN MEDICAL RECORD: ICD-10-PCS | Mod: S$GLB,,, | Performed by: INTERNAL MEDICINE

## 2021-03-26 PROCEDURE — 99999 PR PBB SHADOW E&M-EST. PATIENT-LVL IV: ICD-10-PCS | Mod: PBBFAC,,, | Performed by: INTERNAL MEDICINE

## 2021-03-26 PROCEDURE — 1157F ADVNC CARE PLAN IN RCRD: CPT | Mod: S$GLB,,, | Performed by: INTERNAL MEDICINE

## 2021-03-26 PROCEDURE — 1159F PR MEDICATION LIST DOCUMENTED IN MEDICAL RECORD: ICD-10-PCS | Mod: S$GLB,,, | Performed by: INTERNAL MEDICINE

## 2021-03-26 PROCEDURE — 99214 OFFICE O/P EST MOD 30 MIN: CPT | Mod: S$GLB,,, | Performed by: INTERNAL MEDICINE

## 2021-03-26 PROCEDURE — 1125F AMNT PAIN NOTED PAIN PRSNT: CPT | Mod: S$GLB,,, | Performed by: INTERNAL MEDICINE

## 2021-03-26 RX ORDER — ZOSTER VACCINE RECOMBINANT, ADJUVANTED 50 MCG/0.5
0.5 KIT INTRAMUSCULAR ONCE
Qty: 1 EACH | Refills: 1 | Status: SHIPPED | OUTPATIENT
Start: 2021-03-26 | End: 2021-03-26

## 2021-03-26 RX ORDER — LOSARTAN POTASSIUM 100 MG/1
100 TABLET ORAL DAILY
Qty: 30 TABLET | Refills: 11 | Status: SHIPPED | OUTPATIENT
Start: 2021-03-26 | End: 2022-04-27

## 2021-03-27 LAB — COMPLEXED PSA SERPL-MCNC: 0.79 NG/ML (ref 0–4)

## 2021-04-30 ENCOUNTER — CLINICAL SUPPORT (OUTPATIENT)
Dept: INTERNAL MEDICINE | Facility: CLINIC | Age: 66
End: 2021-04-30
Payer: COMMERCIAL

## 2021-04-30 ENCOUNTER — TELEPHONE (OUTPATIENT)
Dept: INTERNAL MEDICINE | Facility: CLINIC | Age: 66
End: 2021-04-30

## 2021-04-30 DIAGNOSIS — Z23 NEED FOR VACCINATION AGAINST STREPTOCOCCUS PNEUMONIAE: Primary | ICD-10-CM

## 2021-04-30 PROCEDURE — 90732 PNEUMOCOCCAL POLYSACCHARIDE VACCINE 23-VALENT =>2YO SQ IM: ICD-10-PCS | Mod: S$GLB,,, | Performed by: INTERNAL MEDICINE

## 2021-04-30 PROCEDURE — 90471 IMMUNIZATION ADMIN: CPT | Mod: S$GLB,,, | Performed by: INTERNAL MEDICINE

## 2021-04-30 PROCEDURE — 90732 PPSV23 VACC 2 YRS+ SUBQ/IM: CPT | Mod: S$GLB,,, | Performed by: INTERNAL MEDICINE

## 2021-04-30 PROCEDURE — 90471 PNEUMOCOCCAL POLYSACCHARIDE VACCINE 23-VALENT =>2YO SQ IM: ICD-10-PCS | Mod: S$GLB,,, | Performed by: INTERNAL MEDICINE

## 2021-04-30 RX ORDER — ZOSTER VACCINE RECOMBINANT, ADJUVANTED 50 MCG/0.5
0.5 KIT INTRAMUSCULAR ONCE
Qty: 1 EACH | Refills: 1 | Status: SHIPPED | OUTPATIENT
Start: 2021-04-30 | End: 2021-04-30

## 2021-05-03 ENCOUNTER — PATIENT MESSAGE (OUTPATIENT)
Dept: ENDOCRINOLOGY | Facility: CLINIC | Age: 66
End: 2021-05-03

## 2021-05-04 ENCOUNTER — OFFICE VISIT (OUTPATIENT)
Dept: ENDOCRINOLOGY | Facility: CLINIC | Age: 66
End: 2021-05-04
Payer: COMMERCIAL

## 2021-05-04 VITALS
DIASTOLIC BLOOD PRESSURE: 89 MMHG | WEIGHT: 215.38 LBS | HEART RATE: 64 BPM | SYSTOLIC BLOOD PRESSURE: 130 MMHG | BODY MASS INDEX: 30.15 KG/M2 | RESPIRATION RATE: 16 BRPM | HEIGHT: 71 IN

## 2021-05-04 DIAGNOSIS — E89.0 POSTOPERATIVE HYPOTHYROIDISM: ICD-10-CM

## 2021-05-04 DIAGNOSIS — I10 ESSENTIAL HYPERTENSION: Chronic | ICD-10-CM

## 2021-05-04 DIAGNOSIS — C73 PAPILLARY THYROID CARCINOMA: Primary | ICD-10-CM

## 2021-05-04 PROCEDURE — 1157F ADVNC CARE PLAN IN RCRD: CPT | Mod: S$GLB,,, | Performed by: INTERNAL MEDICINE

## 2021-05-04 PROCEDURE — 1126F AMNT PAIN NOTED NONE PRSNT: CPT | Mod: S$GLB,,, | Performed by: INTERNAL MEDICINE

## 2021-05-04 PROCEDURE — 1157F PR ADVANCE CARE PLAN OR EQUIV PRESENT IN MEDICAL RECORD: ICD-10-PCS | Mod: S$GLB,,, | Performed by: INTERNAL MEDICINE

## 2021-05-04 PROCEDURE — 1101F PR PT FALLS ASSESS DOC 0-1 FALLS W/OUT INJ PAST YR: ICD-10-PCS | Mod: CPTII,S$GLB,, | Performed by: INTERNAL MEDICINE

## 2021-05-04 PROCEDURE — 1126F PR PAIN SEVERITY QUANTIFIED, NO PAIN PRESENT: ICD-10-PCS | Mod: S$GLB,,, | Performed by: INTERNAL MEDICINE

## 2021-05-04 PROCEDURE — 1159F PR MEDICATION LIST DOCUMENTED IN MEDICAL RECORD: ICD-10-PCS | Mod: S$GLB,,, | Performed by: INTERNAL MEDICINE

## 2021-05-04 PROCEDURE — 99999 PR PBB SHADOW E&M-EST. PATIENT-LVL IV: CPT | Mod: PBBFAC,,, | Performed by: INTERNAL MEDICINE

## 2021-05-04 PROCEDURE — 3008F PR BODY MASS INDEX (BMI) DOCUMENTED: ICD-10-PCS | Mod: CPTII,S$GLB,, | Performed by: INTERNAL MEDICINE

## 2021-05-04 PROCEDURE — 3288F PR FALLS RISK ASSESSMENT DOCUMENTED: ICD-10-PCS | Mod: CPTII,S$GLB,, | Performed by: INTERNAL MEDICINE

## 2021-05-04 PROCEDURE — 3008F BODY MASS INDEX DOCD: CPT | Mod: CPTII,S$GLB,, | Performed by: INTERNAL MEDICINE

## 2021-05-04 PROCEDURE — 3288F FALL RISK ASSESSMENT DOCD: CPT | Mod: CPTII,S$GLB,, | Performed by: INTERNAL MEDICINE

## 2021-05-04 PROCEDURE — 99214 OFFICE O/P EST MOD 30 MIN: CPT | Mod: S$GLB,,, | Performed by: INTERNAL MEDICINE

## 2021-05-04 PROCEDURE — 99999 PR PBB SHADOW E&M-EST. PATIENT-LVL IV: ICD-10-PCS | Mod: PBBFAC,,, | Performed by: INTERNAL MEDICINE

## 2021-05-04 PROCEDURE — 1101F PT FALLS ASSESS-DOCD LE1/YR: CPT | Mod: CPTII,S$GLB,, | Performed by: INTERNAL MEDICINE

## 2021-05-04 PROCEDURE — 1159F MED LIST DOCD IN RCRD: CPT | Mod: S$GLB,,, | Performed by: INTERNAL MEDICINE

## 2021-05-04 PROCEDURE — 99214 PR OFFICE/OUTPT VISIT, EST, LEVL IV, 30-39 MIN: ICD-10-PCS | Mod: S$GLB,,, | Performed by: INTERNAL MEDICINE

## 2021-05-20 ENCOUNTER — HOSPITAL ENCOUNTER (OUTPATIENT)
Dept: RADIOLOGY | Facility: HOSPITAL | Age: 66
Discharge: HOME | End: 2021-05-20
Attending: INTERNAL MEDICINE
Payer: COMMERCIAL

## 2021-05-20 DIAGNOSIS — C73 PAPILLARY THYROID CARCINOMA: ICD-10-CM

## 2021-05-20 DIAGNOSIS — E89.0 POSTOPERATIVE HYPOTHYROIDISM: ICD-10-CM

## 2021-05-20 PROCEDURE — 76536 US EXAM OF HEAD AND NECK: CPT | Mod: 26,,, | Performed by: RADIOLOGY

## 2021-05-20 PROCEDURE — 76536 US SOFT TISSUE HEAD NECK THYROID: ICD-10-PCS | Mod: 26,,, | Performed by: RADIOLOGY

## 2021-05-20 PROCEDURE — 76536 US EXAM OF HEAD AND NECK: CPT | Mod: TC

## 2021-05-21 ENCOUNTER — PATIENT MESSAGE (OUTPATIENT)
Dept: ENDOCRINOLOGY | Facility: CLINIC | Age: 66
End: 2021-05-21

## 2021-06-03 ENCOUNTER — TELEPHONE (OUTPATIENT)
Dept: INTERNAL MEDICINE | Facility: CLINIC | Age: 66
End: 2021-06-03

## 2021-06-03 DIAGNOSIS — E78.49 OTHER HYPERLIPIDEMIA: ICD-10-CM

## 2021-06-03 DIAGNOSIS — I10 ESSENTIAL HYPERTENSION: Primary | ICD-10-CM

## 2021-06-03 DIAGNOSIS — D56.1 THALASSEMIA, BETA: ICD-10-CM

## 2021-06-24 ENCOUNTER — LAB VISIT (OUTPATIENT)
Dept: LAB | Facility: HOSPITAL | Age: 66
End: 2021-06-24
Attending: INTERNAL MEDICINE
Payer: COMMERCIAL

## 2021-06-24 DIAGNOSIS — C73 PAPILLARY THYROID CARCINOMA: ICD-10-CM

## 2021-06-24 DIAGNOSIS — E78.49 OTHER HYPERLIPIDEMIA: ICD-10-CM

## 2021-06-24 DIAGNOSIS — D56.1 THALASSEMIA, BETA: ICD-10-CM

## 2021-06-24 DIAGNOSIS — E89.0 POSTOPERATIVE HYPOTHYROIDISM: ICD-10-CM

## 2021-06-24 DIAGNOSIS — I10 ESSENTIAL HYPERTENSION: ICD-10-CM

## 2021-06-24 LAB
ALBUMIN SERPL BCP-MCNC: 3.8 G/DL (ref 3.5–5.2)
ALBUMIN SERPL BCP-MCNC: 3.8 G/DL (ref 3.5–5.2)
ALP SERPL-CCNC: 45 U/L (ref 55–135)
ALT SERPL W/O P-5'-P-CCNC: 25 U/L (ref 10–44)
ANION GAP SERPL CALC-SCNC: 8 MMOL/L (ref 8–16)
ANION GAP SERPL CALC-SCNC: 8 MMOL/L (ref 8–16)
AST SERPL-CCNC: 21 U/L (ref 10–40)
BASOPHILS # BLD AUTO: 0.02 K/UL (ref 0–0.2)
BASOPHILS NFR BLD: 0.4 % (ref 0–1.9)
BILIRUB SERPL-MCNC: 0.9 MG/DL (ref 0.1–1)
BUN SERPL-MCNC: 16 MG/DL (ref 8–23)
BUN SERPL-MCNC: 16 MG/DL (ref 8–23)
CALCIUM SERPL-MCNC: 9.3 MG/DL (ref 8.7–10.5)
CALCIUM SERPL-MCNC: 9.3 MG/DL (ref 8.7–10.5)
CHLORIDE SERPL-SCNC: 103 MMOL/L (ref 95–110)
CHLORIDE SERPL-SCNC: 103 MMOL/L (ref 95–110)
CHOLEST SERPL-MCNC: 181 MG/DL (ref 120–199)
CHOLEST/HDLC SERPL: 5 {RATIO} (ref 2–5)
CO2 SERPL-SCNC: 26 MMOL/L (ref 23–29)
CO2 SERPL-SCNC: 26 MMOL/L (ref 23–29)
CREAT SERPL-MCNC: 1.3 MG/DL (ref 0.5–1.4)
CREAT SERPL-MCNC: 1.3 MG/DL (ref 0.5–1.4)
DIFFERENTIAL METHOD: ABNORMAL
EOSINOPHIL # BLD AUTO: 0.2 K/UL (ref 0–0.5)
EOSINOPHIL NFR BLD: 3.2 % (ref 0–8)
ERYTHROCYTE [DISTWIDTH] IN BLOOD BY AUTOMATED COUNT: 14.9 % (ref 11.5–14.5)
EST. GFR  (AFRICAN AMERICAN): >60 ML/MIN/1.73 M^2
EST. GFR  (AFRICAN AMERICAN): >60 ML/MIN/1.73 M^2
EST. GFR  (NON AFRICAN AMERICAN): 56.5 ML/MIN/1.73 M^2
EST. GFR  (NON AFRICAN AMERICAN): 56.5 ML/MIN/1.73 M^2
GLUCOSE SERPL-MCNC: 97 MG/DL (ref 70–110)
GLUCOSE SERPL-MCNC: 97 MG/DL (ref 70–110)
HCT VFR BLD AUTO: 41.5 % (ref 40–54)
HDLC SERPL-MCNC: 36 MG/DL (ref 40–75)
HDLC SERPL: 19.9 % (ref 20–50)
HGB BLD-MCNC: 12.9 G/DL (ref 14–18)
IMM GRANULOCYTES # BLD AUTO: 0.01 K/UL (ref 0–0.04)
IMM GRANULOCYTES NFR BLD AUTO: 0.2 % (ref 0–0.5)
LDLC SERPL CALC-MCNC: 126.4 MG/DL (ref 63–159)
LYMPHOCYTES # BLD AUTO: 3 K/UL (ref 1–4.8)
LYMPHOCYTES NFR BLD: 57.1 % (ref 18–48)
MCH RBC QN AUTO: 23.8 PG (ref 27–31)
MCHC RBC AUTO-ENTMCNC: 31.1 G/DL (ref 32–36)
MCV RBC AUTO: 77 FL (ref 82–98)
MONOCYTES # BLD AUTO: 0.5 K/UL (ref 0.3–1)
MONOCYTES NFR BLD: 10.1 % (ref 4–15)
NEUTROPHILS # BLD AUTO: 1.5 K/UL (ref 1.8–7.7)
NEUTROPHILS NFR BLD: 29 % (ref 38–73)
NONHDLC SERPL-MCNC: 145 MG/DL
NRBC BLD-RTO: 0 /100 WBC
PHOSPHATE SERPL-MCNC: 3.3 MG/DL (ref 2.7–4.5)
PLATELET # BLD AUTO: 270 K/UL (ref 150–450)
PMV BLD AUTO: 9.3 FL (ref 9.2–12.9)
POTASSIUM SERPL-SCNC: 4.1 MMOL/L (ref 3.5–5.1)
POTASSIUM SERPL-SCNC: 4.1 MMOL/L (ref 3.5–5.1)
PROT SERPL-MCNC: 7.1 G/DL (ref 6–8.4)
PTH-INTACT SERPL-MCNC: 56 PG/ML (ref 9–77)
RBC # BLD AUTO: 5.42 M/UL (ref 4.6–6.2)
SODIUM SERPL-SCNC: 137 MMOL/L (ref 136–145)
SODIUM SERPL-SCNC: 137 MMOL/L (ref 136–145)
TRIGL SERPL-MCNC: 93 MG/DL (ref 30–150)
TSH SERPL DL<=0.005 MIU/L-ACNC: 1.14 UIU/ML (ref 0.4–4)
WBC # BLD AUTO: 5.25 K/UL (ref 3.9–12.7)

## 2021-06-24 PROCEDURE — 80069 RENAL FUNCTION PANEL: CPT | Performed by: INTERNAL MEDICINE

## 2021-06-24 PROCEDURE — 85025 COMPLETE CBC W/AUTO DIFF WBC: CPT | Performed by: INTERNAL MEDICINE

## 2021-06-24 PROCEDURE — 86800 THYROGLOBULIN ANTIBODY: CPT | Performed by: INTERNAL MEDICINE

## 2021-06-24 PROCEDURE — 80061 LIPID PANEL: CPT | Performed by: INTERNAL MEDICINE

## 2021-06-24 PROCEDURE — 84443 ASSAY THYROID STIM HORMONE: CPT | Performed by: INTERNAL MEDICINE

## 2021-06-24 PROCEDURE — 36415 COLL VENOUS BLD VENIPUNCTURE: CPT | Mod: PO | Performed by: INTERNAL MEDICINE

## 2021-06-24 PROCEDURE — 80053 COMPREHEN METABOLIC PANEL: CPT | Performed by: INTERNAL MEDICINE

## 2021-06-24 PROCEDURE — 83970 ASSAY OF PARATHORMONE: CPT | Performed by: INTERNAL MEDICINE

## 2021-06-25 ENCOUNTER — PATIENT MESSAGE (OUTPATIENT)
Dept: ENDOCRINOLOGY | Facility: CLINIC | Age: 66
End: 2021-06-25

## 2021-06-29 ENCOUNTER — PATIENT MESSAGE (OUTPATIENT)
Dept: INTERNAL MEDICINE | Facility: CLINIC | Age: 66
End: 2021-06-29

## 2021-06-30 ENCOUNTER — PATIENT MESSAGE (OUTPATIENT)
Dept: ENDOCRINOLOGY | Facility: CLINIC | Age: 66
End: 2021-06-30

## 2021-06-30 DIAGNOSIS — C73 PAPILLARY THYROID CARCINOMA: Primary | ICD-10-CM

## 2021-06-30 DIAGNOSIS — E89.0 POSTOPERATIVE HYPOTHYROIDISM: ICD-10-CM

## 2021-07-01 ENCOUNTER — OFFICE VISIT (OUTPATIENT)
Dept: INTERNAL MEDICINE | Facility: CLINIC | Age: 66
End: 2021-07-01
Payer: COMMERCIAL

## 2021-07-01 ENCOUNTER — PATIENT MESSAGE (OUTPATIENT)
Dept: INTERNAL MEDICINE | Facility: CLINIC | Age: 66
End: 2021-07-01

## 2021-07-01 VITALS
BODY MASS INDEX: 30.53 KG/M2 | TEMPERATURE: 98 F | DIASTOLIC BLOOD PRESSURE: 74 MMHG | HEIGHT: 71 IN | SYSTOLIC BLOOD PRESSURE: 126 MMHG | WEIGHT: 218.06 LBS | OXYGEN SATURATION: 98 % | HEART RATE: 64 BPM

## 2021-07-01 DIAGNOSIS — D56.1 THALASSEMIA, BETA: ICD-10-CM

## 2021-07-01 DIAGNOSIS — I10 ESSENTIAL HYPERTENSION: Primary | ICD-10-CM

## 2021-07-01 DIAGNOSIS — C61 PROSTATE CANCER: ICD-10-CM

## 2021-07-01 PROCEDURE — 1101F PR PT FALLS ASSESS DOC 0-1 FALLS W/OUT INJ PAST YR: ICD-10-PCS | Mod: CPTII,S$GLB,, | Performed by: INTERNAL MEDICINE

## 2021-07-01 PROCEDURE — 99999 PR PBB SHADOW E&M-EST. PATIENT-LVL IV: ICD-10-PCS | Mod: PBBFAC,,, | Performed by: INTERNAL MEDICINE

## 2021-07-01 PROCEDURE — 99214 PR OFFICE/OUTPT VISIT, EST, LEVL IV, 30-39 MIN: ICD-10-PCS | Mod: S$GLB,,, | Performed by: INTERNAL MEDICINE

## 2021-07-01 PROCEDURE — 1126F AMNT PAIN NOTED NONE PRSNT: CPT | Mod: CPTII,S$GLB,, | Performed by: INTERNAL MEDICINE

## 2021-07-01 PROCEDURE — 3078F DIAST BP <80 MM HG: CPT | Mod: CPTII,S$GLB,, | Performed by: INTERNAL MEDICINE

## 2021-07-01 PROCEDURE — 99214 OFFICE O/P EST MOD 30 MIN: CPT | Mod: S$GLB,,, | Performed by: INTERNAL MEDICINE

## 2021-07-01 PROCEDURE — 3008F BODY MASS INDEX DOCD: CPT | Mod: CPTII,S$GLB,, | Performed by: INTERNAL MEDICINE

## 2021-07-01 PROCEDURE — 1159F PR MEDICATION LIST DOCUMENTED IN MEDICAL RECORD: ICD-10-PCS | Mod: CPTII,S$GLB,, | Performed by: INTERNAL MEDICINE

## 2021-07-01 PROCEDURE — 3074F PR MOST RECENT SYSTOLIC BLOOD PRESSURE < 130 MM HG: ICD-10-PCS | Mod: CPTII,S$GLB,, | Performed by: INTERNAL MEDICINE

## 2021-07-01 PROCEDURE — 1157F PR ADVANCE CARE PLAN OR EQUIV PRESENT IN MEDICAL RECORD: ICD-10-PCS | Mod: CPTII,S$GLB,, | Performed by: INTERNAL MEDICINE

## 2021-07-01 PROCEDURE — 1101F PT FALLS ASSESS-DOCD LE1/YR: CPT | Mod: CPTII,S$GLB,, | Performed by: INTERNAL MEDICINE

## 2021-07-01 PROCEDURE — 3008F PR BODY MASS INDEX (BMI) DOCUMENTED: ICD-10-PCS | Mod: CPTII,S$GLB,, | Performed by: INTERNAL MEDICINE

## 2021-07-01 PROCEDURE — 1157F ADVNC CARE PLAN IN RCRD: CPT | Mod: CPTII,S$GLB,, | Performed by: INTERNAL MEDICINE

## 2021-07-01 PROCEDURE — 1126F PR PAIN SEVERITY QUANTIFIED, NO PAIN PRESENT: ICD-10-PCS | Mod: CPTII,S$GLB,, | Performed by: INTERNAL MEDICINE

## 2021-07-01 PROCEDURE — 3288F FALL RISK ASSESSMENT DOCD: CPT | Mod: CPTII,S$GLB,, | Performed by: INTERNAL MEDICINE

## 2021-07-01 PROCEDURE — 3074F SYST BP LT 130 MM HG: CPT | Mod: CPTII,S$GLB,, | Performed by: INTERNAL MEDICINE

## 2021-07-01 PROCEDURE — 3288F PR FALLS RISK ASSESSMENT DOCUMENTED: ICD-10-PCS | Mod: CPTII,S$GLB,, | Performed by: INTERNAL MEDICINE

## 2021-07-01 PROCEDURE — 99999 PR PBB SHADOW E&M-EST. PATIENT-LVL IV: CPT | Mod: PBBFAC,,, | Performed by: INTERNAL MEDICINE

## 2021-07-01 PROCEDURE — 1160F RVW MEDS BY RX/DR IN RCRD: CPT | Mod: CPTII,S$GLB,, | Performed by: INTERNAL MEDICINE

## 2021-07-01 PROCEDURE — 1159F MED LIST DOCD IN RCRD: CPT | Mod: CPTII,S$GLB,, | Performed by: INTERNAL MEDICINE

## 2021-07-01 PROCEDURE — 3078F PR MOST RECENT DIASTOLIC BLOOD PRESSURE < 80 MM HG: ICD-10-PCS | Mod: CPTII,S$GLB,, | Performed by: INTERNAL MEDICINE

## 2021-07-01 PROCEDURE — 1160F PR REVIEW ALL MEDS BY PRESCRIBER/CLIN PHARMACIST DOCUMENTED: ICD-10-PCS | Mod: CPTII,S$GLB,, | Performed by: INTERNAL MEDICINE

## 2021-08-19 ENCOUNTER — TELEPHONE (OUTPATIENT)
Dept: INTERNAL MEDICINE | Facility: CLINIC | Age: 66
End: 2021-08-19

## 2021-09-17 ENCOUNTER — LAB VISIT (OUTPATIENT)
Dept: LAB | Facility: HOSPITAL | Age: 66
End: 2021-09-17
Attending: INTERNAL MEDICINE
Payer: COMMERCIAL

## 2021-09-17 DIAGNOSIS — D56.1 THALASSEMIA, BETA: ICD-10-CM

## 2021-09-17 DIAGNOSIS — C61 PROSTATE CANCER: ICD-10-CM

## 2021-09-17 DIAGNOSIS — I10 ESSENTIAL HYPERTENSION: ICD-10-CM

## 2021-09-17 LAB
ALBUMIN SERPL BCP-MCNC: 3.8 G/DL (ref 3.5–5.2)
ALP SERPL-CCNC: 42 U/L (ref 55–135)
ALT SERPL W/O P-5'-P-CCNC: 21 U/L (ref 10–44)
ANION GAP SERPL CALC-SCNC: 9 MMOL/L (ref 8–16)
AST SERPL-CCNC: 18 U/L (ref 10–40)
BASOPHILS # BLD AUTO: 0.03 K/UL (ref 0–0.2)
BASOPHILS NFR BLD: 0.5 % (ref 0–1.9)
BILIRUB SERPL-MCNC: 0.7 MG/DL (ref 0.1–1)
BUN SERPL-MCNC: 18 MG/DL (ref 8–23)
CALCIUM SERPL-MCNC: 9.3 MG/DL (ref 8.7–10.5)
CHLORIDE SERPL-SCNC: 105 MMOL/L (ref 95–110)
CO2 SERPL-SCNC: 25 MMOL/L (ref 23–29)
COMPLEXED PSA SERPL-MCNC: 0.9 NG/ML (ref 0–4)
CREAT SERPL-MCNC: 1.2 MG/DL (ref 0.5–1.4)
DIFFERENTIAL METHOD: ABNORMAL
EOSINOPHIL # BLD AUTO: 0.2 K/UL (ref 0–0.5)
EOSINOPHIL NFR BLD: 3 % (ref 0–8)
ERYTHROCYTE [DISTWIDTH] IN BLOOD BY AUTOMATED COUNT: 14.9 % (ref 11.5–14.5)
EST. GFR  (AFRICAN AMERICAN): >60 ML/MIN/1.73 M^2
EST. GFR  (NON AFRICAN AMERICAN): >60 ML/MIN/1.73 M^2
FERRITIN SERPL-MCNC: 189 NG/ML (ref 20–300)
GLUCOSE SERPL-MCNC: 98 MG/DL (ref 70–110)
HCT VFR BLD AUTO: 41.6 % (ref 40–54)
HGB BLD-MCNC: 13.2 G/DL (ref 14–18)
IMM GRANULOCYTES # BLD AUTO: 0.01 K/UL (ref 0–0.04)
IMM GRANULOCYTES NFR BLD AUTO: 0.2 % (ref 0–0.5)
IRON SERPL-MCNC: 85 UG/DL (ref 45–160)
LYMPHOCYTES # BLD AUTO: 3.2 K/UL (ref 1–4.8)
LYMPHOCYTES NFR BLD: 53.3 % (ref 18–48)
MCH RBC QN AUTO: 24.5 PG (ref 27–31)
MCHC RBC AUTO-ENTMCNC: 31.7 G/DL (ref 32–36)
MCV RBC AUTO: 77 FL (ref 82–98)
MONOCYTES # BLD AUTO: 0.5 K/UL (ref 0.3–1)
MONOCYTES NFR BLD: 7.9 % (ref 4–15)
NEUTROPHILS # BLD AUTO: 2.1 K/UL (ref 1.8–7.7)
NEUTROPHILS NFR BLD: 35.1 % (ref 38–73)
NRBC BLD-RTO: 0 /100 WBC
PLATELET # BLD AUTO: 268 K/UL (ref 150–450)
PMV BLD AUTO: 9.5 FL (ref 9.2–12.9)
POTASSIUM SERPL-SCNC: 4.2 MMOL/L (ref 3.5–5.1)
PROT SERPL-MCNC: 6.9 G/DL (ref 6–8.4)
RBC # BLD AUTO: 5.38 M/UL (ref 4.6–6.2)
SATURATED IRON: 25 % (ref 20–50)
SODIUM SERPL-SCNC: 139 MMOL/L (ref 136–145)
TOTAL IRON BINDING CAPACITY: 342 UG/DL (ref 250–450)
TRANSFERRIN SERPL-MCNC: 231 MG/DL (ref 200–375)
WBC # BLD AUTO: 5.97 K/UL (ref 3.9–12.7)

## 2021-09-17 PROCEDURE — 82728 ASSAY OF FERRITIN: CPT | Performed by: INTERNAL MEDICINE

## 2021-09-17 PROCEDURE — 36415 COLL VENOUS BLD VENIPUNCTURE: CPT | Mod: PN | Performed by: INTERNAL MEDICINE

## 2021-09-17 PROCEDURE — 84466 ASSAY OF TRANSFERRIN: CPT | Performed by: INTERNAL MEDICINE

## 2021-09-17 PROCEDURE — 85025 COMPLETE CBC W/AUTO DIFF WBC: CPT | Performed by: INTERNAL MEDICINE

## 2021-09-17 PROCEDURE — 84153 ASSAY OF PSA TOTAL: CPT | Performed by: INTERNAL MEDICINE

## 2021-09-17 PROCEDURE — 80053 COMPREHEN METABOLIC PANEL: CPT | Performed by: INTERNAL MEDICINE

## 2021-09-23 ENCOUNTER — OFFICE VISIT (OUTPATIENT)
Dept: INTERNAL MEDICINE | Facility: CLINIC | Age: 66
End: 2021-09-23
Payer: COMMERCIAL

## 2021-09-23 VITALS
SYSTOLIC BLOOD PRESSURE: 132 MMHG | HEART RATE: 65 BPM | OXYGEN SATURATION: 98 % | TEMPERATURE: 97 F | DIASTOLIC BLOOD PRESSURE: 82 MMHG | HEIGHT: 71 IN | WEIGHT: 218.69 LBS | BODY MASS INDEX: 30.62 KG/M2 | RESPIRATION RATE: 18 BRPM

## 2021-09-23 DIAGNOSIS — E78.49 OTHER HYPERLIPIDEMIA: ICD-10-CM

## 2021-09-23 DIAGNOSIS — E89.0 POSTOPERATIVE HYPOTHYROIDISM: ICD-10-CM

## 2021-09-23 DIAGNOSIS — I10 ESSENTIAL HYPERTENSION: Primary | ICD-10-CM

## 2021-09-23 PROCEDURE — 1125F PR PAIN SEVERITY QUANTIFIED, PAIN PRESENT: ICD-10-PCS | Mod: CPTII,S$GLB,, | Performed by: INTERNAL MEDICINE

## 2021-09-23 PROCEDURE — 99999 PR PBB SHADOW E&M-EST. PATIENT-LVL IV: CPT | Mod: PBBFAC,,, | Performed by: INTERNAL MEDICINE

## 2021-09-23 PROCEDURE — 3075F PR MOST RECENT SYSTOLIC BLOOD PRESS GE 130-139MM HG: ICD-10-PCS | Mod: CPTII,S$GLB,, | Performed by: INTERNAL MEDICINE

## 2021-09-23 PROCEDURE — 1160F RVW MEDS BY RX/DR IN RCRD: CPT | Mod: CPTII,S$GLB,, | Performed by: INTERNAL MEDICINE

## 2021-09-23 PROCEDURE — 99214 PR OFFICE/OUTPT VISIT, EST, LEVL IV, 30-39 MIN: ICD-10-PCS | Mod: S$GLB,,, | Performed by: INTERNAL MEDICINE

## 2021-09-23 PROCEDURE — 4010F PR ACE/ARB THEARPY RXD/TAKEN: ICD-10-PCS | Mod: CPTII,S$GLB,, | Performed by: INTERNAL MEDICINE

## 2021-09-23 PROCEDURE — 3079F DIAST BP 80-89 MM HG: CPT | Mod: CPTII,S$GLB,, | Performed by: INTERNAL MEDICINE

## 2021-09-23 PROCEDURE — 3008F BODY MASS INDEX DOCD: CPT | Mod: CPTII,S$GLB,, | Performed by: INTERNAL MEDICINE

## 2021-09-23 PROCEDURE — 4010F ACE/ARB THERAPY RXD/TAKEN: CPT | Mod: CPTII,S$GLB,, | Performed by: INTERNAL MEDICINE

## 2021-09-23 PROCEDURE — 3008F PR BODY MASS INDEX (BMI) DOCUMENTED: ICD-10-PCS | Mod: CPTII,S$GLB,, | Performed by: INTERNAL MEDICINE

## 2021-09-23 PROCEDURE — 99214 OFFICE O/P EST MOD 30 MIN: CPT | Mod: S$GLB,,, | Performed by: INTERNAL MEDICINE

## 2021-09-23 PROCEDURE — 1159F MED LIST DOCD IN RCRD: CPT | Mod: CPTII,S$GLB,, | Performed by: INTERNAL MEDICINE

## 2021-09-23 PROCEDURE — 99999 PR PBB SHADOW E&M-EST. PATIENT-LVL IV: ICD-10-PCS | Mod: PBBFAC,,, | Performed by: INTERNAL MEDICINE

## 2021-09-23 PROCEDURE — 3079F PR MOST RECENT DIASTOLIC BLOOD PRESSURE 80-89 MM HG: ICD-10-PCS | Mod: CPTII,S$GLB,, | Performed by: INTERNAL MEDICINE

## 2021-09-23 PROCEDURE — 1125F AMNT PAIN NOTED PAIN PRSNT: CPT | Mod: CPTII,S$GLB,, | Performed by: INTERNAL MEDICINE

## 2021-09-23 PROCEDURE — 1157F ADVNC CARE PLAN IN RCRD: CPT | Mod: CPTII,S$GLB,, | Performed by: INTERNAL MEDICINE

## 2021-09-23 PROCEDURE — 1160F PR REVIEW ALL MEDS BY PRESCRIBER/CLIN PHARMACIST DOCUMENTED: ICD-10-PCS | Mod: CPTII,S$GLB,, | Performed by: INTERNAL MEDICINE

## 2021-09-23 PROCEDURE — 1157F PR ADVANCE CARE PLAN OR EQUIV PRESENT IN MEDICAL RECORD: ICD-10-PCS | Mod: CPTII,S$GLB,, | Performed by: INTERNAL MEDICINE

## 2021-09-23 PROCEDURE — 1159F PR MEDICATION LIST DOCUMENTED IN MEDICAL RECORD: ICD-10-PCS | Mod: CPTII,S$GLB,, | Performed by: INTERNAL MEDICINE

## 2021-09-23 PROCEDURE — 3075F SYST BP GE 130 - 139MM HG: CPT | Mod: CPTII,S$GLB,, | Performed by: INTERNAL MEDICINE

## 2021-09-27 ENCOUNTER — PATIENT MESSAGE (OUTPATIENT)
Dept: INTERNAL MEDICINE | Facility: CLINIC | Age: 66
End: 2021-09-27

## 2021-11-24 ENCOUNTER — PATIENT MESSAGE (OUTPATIENT)
Dept: ENDOCRINOLOGY | Facility: CLINIC | Age: 66
End: 2021-11-24
Payer: COMMERCIAL

## 2021-12-16 DIAGNOSIS — E89.0 POSTOPERATIVE HYPOTHYROIDISM: Primary | ICD-10-CM

## 2021-12-16 RX ORDER — LEVOTHYROXINE SODIUM 150 UG/1
150 TABLET ORAL DAILY
Qty: 30 TABLET | Refills: 11 | Status: SHIPPED | OUTPATIENT
Start: 2021-12-16 | End: 2021-12-16 | Stop reason: SDUPTHER

## 2021-12-16 RX ORDER — LEVOTHYROXINE SODIUM 150 UG/1
150 TABLET ORAL DAILY
Qty: 30 TABLET | Refills: 4 | Status: SHIPPED | OUTPATIENT
Start: 2021-12-16 | End: 2022-01-27 | Stop reason: SDUPTHER

## 2022-01-20 ENCOUNTER — LAB VISIT (OUTPATIENT)
Dept: LAB | Facility: HOSPITAL | Age: 67
End: 2022-01-20
Attending: INTERNAL MEDICINE
Payer: COMMERCIAL

## 2022-01-20 DIAGNOSIS — C73 PAPILLARY THYROID CARCINOMA: ICD-10-CM

## 2022-01-20 DIAGNOSIS — E89.0 POSTOPERATIVE HYPOTHYROIDISM: ICD-10-CM

## 2022-01-20 LAB — TSH SERPL DL<=0.005 MIU/L-ACNC: 0.99 UIU/ML (ref 0.4–4)

## 2022-01-20 PROCEDURE — 84443 ASSAY THYROID STIM HORMONE: CPT | Performed by: INTERNAL MEDICINE

## 2022-01-20 PROCEDURE — 86800 THYROGLOBULIN ANTIBODY: CPT | Performed by: INTERNAL MEDICINE

## 2022-01-27 ENCOUNTER — OFFICE VISIT (OUTPATIENT)
Dept: ENDOCRINOLOGY | Facility: CLINIC | Age: 67
End: 2022-01-27
Payer: COMMERCIAL

## 2022-01-27 VITALS
HEIGHT: 71 IN | DIASTOLIC BLOOD PRESSURE: 70 MMHG | BODY MASS INDEX: 30.92 KG/M2 | HEART RATE: 61 BPM | OXYGEN SATURATION: 99 % | WEIGHT: 220.88 LBS | SYSTOLIC BLOOD PRESSURE: 120 MMHG

## 2022-01-27 DIAGNOSIS — C73 PAPILLARY THYROID CARCINOMA: Primary | ICD-10-CM

## 2022-01-27 DIAGNOSIS — E89.0 POSTOPERATIVE HYPOTHYROIDISM: ICD-10-CM

## 2022-01-27 DIAGNOSIS — I10 ESSENTIAL HYPERTENSION: Chronic | ICD-10-CM

## 2022-01-27 PROCEDURE — 99999 PR PBB SHADOW E&M-EST. PATIENT-LVL IV: ICD-10-PCS | Mod: PBBFAC,,, | Performed by: INTERNAL MEDICINE

## 2022-01-27 PROCEDURE — 1160F RVW MEDS BY RX/DR IN RCRD: CPT | Mod: CPTII,S$GLB,, | Performed by: INTERNAL MEDICINE

## 2022-01-27 PROCEDURE — 3074F PR MOST RECENT SYSTOLIC BLOOD PRESSURE < 130 MM HG: ICD-10-PCS | Mod: CPTII,S$GLB,, | Performed by: INTERNAL MEDICINE

## 2022-01-27 PROCEDURE — 1125F PR PAIN SEVERITY QUANTIFIED, PAIN PRESENT: ICD-10-PCS | Mod: CPTII,S$GLB,, | Performed by: INTERNAL MEDICINE

## 2022-01-27 PROCEDURE — 1159F MED LIST DOCD IN RCRD: CPT | Mod: CPTII,S$GLB,, | Performed by: INTERNAL MEDICINE

## 2022-01-27 PROCEDURE — 99999 PR PBB SHADOW E&M-EST. PATIENT-LVL IV: CPT | Mod: PBBFAC,,, | Performed by: INTERNAL MEDICINE

## 2022-01-27 PROCEDURE — 3078F DIAST BP <80 MM HG: CPT | Mod: CPTII,S$GLB,, | Performed by: INTERNAL MEDICINE

## 2022-01-27 PROCEDURE — 1101F PR PT FALLS ASSESS DOC 0-1 FALLS W/OUT INJ PAST YR: ICD-10-PCS | Mod: CPTII,S$GLB,, | Performed by: INTERNAL MEDICINE

## 2022-01-27 PROCEDURE — 4010F ACE/ARB THERAPY RXD/TAKEN: CPT | Mod: CPTII,S$GLB,, | Performed by: INTERNAL MEDICINE

## 2022-01-27 PROCEDURE — 1125F AMNT PAIN NOTED PAIN PRSNT: CPT | Mod: CPTII,S$GLB,, | Performed by: INTERNAL MEDICINE

## 2022-01-27 PROCEDURE — 1157F PR ADVANCE CARE PLAN OR EQUIV PRESENT IN MEDICAL RECORD: ICD-10-PCS | Mod: CPTII,S$GLB,, | Performed by: INTERNAL MEDICINE

## 2022-01-27 PROCEDURE — 1160F PR REVIEW ALL MEDS BY PRESCRIBER/CLIN PHARMACIST DOCUMENTED: ICD-10-PCS | Mod: CPTII,S$GLB,, | Performed by: INTERNAL MEDICINE

## 2022-01-27 PROCEDURE — 4010F PR ACE/ARB THEARPY RXD/TAKEN: ICD-10-PCS | Mod: CPTII,S$GLB,, | Performed by: INTERNAL MEDICINE

## 2022-01-27 PROCEDURE — 3078F PR MOST RECENT DIASTOLIC BLOOD PRESSURE < 80 MM HG: ICD-10-PCS | Mod: CPTII,S$GLB,, | Performed by: INTERNAL MEDICINE

## 2022-01-27 PROCEDURE — 3288F PR FALLS RISK ASSESSMENT DOCUMENTED: ICD-10-PCS | Mod: CPTII,S$GLB,, | Performed by: INTERNAL MEDICINE

## 2022-01-27 PROCEDURE — 99214 OFFICE O/P EST MOD 30 MIN: CPT | Mod: S$GLB,,, | Performed by: INTERNAL MEDICINE

## 2022-01-27 PROCEDURE — 1101F PT FALLS ASSESS-DOCD LE1/YR: CPT | Mod: CPTII,S$GLB,, | Performed by: INTERNAL MEDICINE

## 2022-01-27 PROCEDURE — 99214 PR OFFICE/OUTPT VISIT, EST, LEVL IV, 30-39 MIN: ICD-10-PCS | Mod: S$GLB,,, | Performed by: INTERNAL MEDICINE

## 2022-01-27 PROCEDURE — 1157F ADVNC CARE PLAN IN RCRD: CPT | Mod: CPTII,S$GLB,, | Performed by: INTERNAL MEDICINE

## 2022-01-27 PROCEDURE — 3008F BODY MASS INDEX DOCD: CPT | Mod: CPTII,S$GLB,, | Performed by: INTERNAL MEDICINE

## 2022-01-27 PROCEDURE — 1159F PR MEDICATION LIST DOCUMENTED IN MEDICAL RECORD: ICD-10-PCS | Mod: CPTII,S$GLB,, | Performed by: INTERNAL MEDICINE

## 2022-01-27 PROCEDURE — 3288F FALL RISK ASSESSMENT DOCD: CPT | Mod: CPTII,S$GLB,, | Performed by: INTERNAL MEDICINE

## 2022-01-27 PROCEDURE — 3074F SYST BP LT 130 MM HG: CPT | Mod: CPTII,S$GLB,, | Performed by: INTERNAL MEDICINE

## 2022-01-27 PROCEDURE — 3008F PR BODY MASS INDEX (BMI) DOCUMENTED: ICD-10-PCS | Mod: CPTII,S$GLB,, | Performed by: INTERNAL MEDICINE

## 2022-01-27 RX ORDER — LEVOTHYROXINE SODIUM 150 UG/1
150 TABLET ORAL DAILY
Qty: 90 TABLET | Refills: 3 | Status: SHIPPED | OUTPATIENT
Start: 2022-01-27 | End: 2022-12-22

## 2022-01-27 NOTE — PROGRESS NOTES
"Subjective:      Patient ID: Dani Gastelum Jr. is a 66 y.o. male.    Chief Complaint:  Papillary thyroid carcinoma      History of Present Illness  65 yo male w/ diagnosis of PTC, HTN, HLD and Beta thalasemia that presents to the endocrine clinic for follow up of thyroid cancer and hypothyroidism. Last visit 5/2021.     In regards to thyroid CA:    -Thyroid US 12/19:  MNG w/ largest nodule on Left thyroid meassuring 1.7 x 1.1 x 1.7 cm, (7) hypoechoic solid-appearing w/ blurred margins and a 1.5cm complex predominantly solid left thyroid nodule. The right thyroid nodules are sub-centimeter   -FNA  On 3/20 of Left inferior 1.7cm thyroid nodule:  Positive for Papillary thyroid Carcinoma   -No Lymph node evaluation on Thyroid US done on 12/19      S/p total thyroidectomy with left central neck dissection on 10/16/2020:  1. Thyroid, total thyroidectomy:  - Papillary thyroid carcinoma, 2.5 cm (pT2)  - Margin uninvolved by carcinoma (0.1 cm to thyroid surface)  - Negative for lymphatic invasion or angioinvasion  - Negative for perineural invasion  - Adenomatoid nodules  - Biopsy site changes identified  - Two benign lymph node, negative for metastatic carcinoma (0/2)  2. Lymph nodes, designated "Left central neck contents", neck dissection:  - Fourteen benign lymph nodes, negative for metastatic carcinoma (0/14)     Currently taking levothyroxine 150 mcg PO daily.   Takes thyroid hormone and hasn't missed any doses.   No significant fatigue. BM's regular. No heat or cold intolerance.   Denies heart palpitations and tremor.      He's noted some mild dysphagia to large pills, but no other issues and no choking. Denies any voice changes.    Results for DANI GASTELUM JR. (MRN [id number]) as of 1/27/2022 07:15   Ref. Range 1/20/2022 09:35   TSH Latest Ref Range: 0.400 - 4.000 uIU/mL 0.988   Thyroglobulin Interpretation Unknown SEE BELOW   Thyroglobulin Antibody Screen Latest Ref Range: <1.8 IU/mL <1.8   Thyroglobulin, Tumor " Marker Latest Units: ng/mL <0.1         Review of Systems   Constitutional: Negative for chills and fever.   Gastrointestinal: Negative for nausea and vomiting.   No CP  No SOB    Objective:   Physical Exam  Vitals and nursing note reviewed.       BP Readings from Last 3 Encounters:   01/27/22 120/70   09/23/21 132/82   07/01/21 126/74     Wt Readings from Last 1 Encounters:   01/27/22 0946 100.2 kg (220 lb 14.4 oz)       Body mass index is 30.81 kg/m².    Lab Review:   No results found for: HGBA1C  Lab Results   Component Value Date    CHOL 181 06/24/2021    HDL 36 (L) 06/24/2021    LDLCALC 126.4 06/24/2021    TRIG 93 06/24/2021    CHOLHDL 19.9 (L) 06/24/2021     Lab Results   Component Value Date     09/17/2021    K 4.2 09/17/2021     09/17/2021    CO2 25 09/17/2021    GLU 98 09/17/2021    BUN 18 09/17/2021    CREATININE 1.2 09/17/2021    CALCIUM 9.3 09/17/2021    PROT 6.9 09/17/2021    ALBUMIN 3.8 09/17/2021    BILITOT 0.7 09/17/2021    ALKPHOS 42 (L) 09/17/2021    AST 18 09/17/2021    ALT 21 09/17/2021    ANIONGAP 9 09/17/2021    ESTGFRAFRICA >60.0 09/17/2021    EGFRNONAA >60.0 09/17/2021    TSH 0.988 01/20/2022         Assessment and Plan     Papillary thyroid carcinoma  --patient s/p total thyroidectomy with left CN neck dissection on 10/16/2020 for unifocal papillary thyroid cancer with 0/14 LN positive  --Stage 1, CASI low risk for recurrence  --Reviewed that he is low risk for recurrence and I would not recommend OLIVEIRA ablation   --Post-op Tg level remains undetectable  --Post-op neck ultrasound after last visit did not demonstrate any significant findings  --Will repeat Tg and neck ultrasound in one year    Postoperative hypothyroidism  --patient with postoperative hypothyroidism  --Clinically euthyroid  --Goal TSH 0.4-2.0  --Recent TSH at goal  --Continue levothyroxine 150 mcg once daily    Essential hypertension  --On single agent losartan  --Bp stable       Follow up in one year with  ultrasound, TSH and thyroglobulin prior to appt      Lj Montalvo M.D. Staff Endocrinology

## 2022-01-27 NOTE — ASSESSMENT & PLAN NOTE
--patient with postoperative hypothyroidism  --Clinically euthyroid  --Goal TSH 0.4-2.0  --Recent TSH at goal  --Continue levothyroxine 150 mcg once daily

## 2022-01-27 NOTE — ASSESSMENT & PLAN NOTE
--patient s/p total thyroidectomy with left CN neck dissection on 10/16/2020 for unifocal papillary thyroid cancer with 0/14 LN positive  --Stage 1, CASI low risk for recurrence  --Reviewed that he is low risk for recurrence and I would not recommend OLIVEIRA ablation   --Post-op Tg level remains undetectable  --Post-op neck ultrasound after last visit did not demonstrate any significant findings  --Will repeat Tg and neck ultrasound in one year

## 2022-03-17 ENCOUNTER — LAB VISIT (OUTPATIENT)
Dept: LAB | Facility: HOSPITAL | Age: 67
End: 2022-03-17
Attending: INTERNAL MEDICINE
Payer: COMMERCIAL

## 2022-03-17 DIAGNOSIS — E78.49 OTHER HYPERLIPIDEMIA: ICD-10-CM

## 2022-03-17 DIAGNOSIS — E89.0 POSTOPERATIVE HYPOTHYROIDISM: ICD-10-CM

## 2022-03-17 DIAGNOSIS — I10 ESSENTIAL HYPERTENSION: ICD-10-CM

## 2022-03-17 LAB
ALBUMIN SERPL BCP-MCNC: 3.8 G/DL (ref 3.5–5.2)
ALP SERPL-CCNC: 43 U/L (ref 55–135)
ALT SERPL W/O P-5'-P-CCNC: 22 U/L (ref 10–44)
ANION GAP SERPL CALC-SCNC: 9 MMOL/L (ref 8–16)
AST SERPL-CCNC: 21 U/L (ref 10–40)
BASOPHILS # BLD AUTO: 0.03 K/UL (ref 0–0.2)
BASOPHILS NFR BLD: 0.6 % (ref 0–1.9)
BILIRUB SERPL-MCNC: 0.9 MG/DL (ref 0.1–1)
BUN SERPL-MCNC: 16 MG/DL (ref 8–23)
CALCIUM SERPL-MCNC: 9.9 MG/DL (ref 8.7–10.5)
CHLORIDE SERPL-SCNC: 104 MMOL/L (ref 95–110)
CHOLEST SERPL-MCNC: 155 MG/DL (ref 120–199)
CHOLEST/HDLC SERPL: 4 {RATIO} (ref 2–5)
CO2 SERPL-SCNC: 27 MMOL/L (ref 23–29)
COMPLEXED PSA SERPL-MCNC: 0.89 NG/ML (ref 0–4)
CREAT SERPL-MCNC: 1.3 MG/DL (ref 0.5–1.4)
DIFFERENTIAL METHOD: ABNORMAL
EOSINOPHIL # BLD AUTO: 0.1 K/UL (ref 0–0.5)
EOSINOPHIL NFR BLD: 3 % (ref 0–8)
ERYTHROCYTE [DISTWIDTH] IN BLOOD BY AUTOMATED COUNT: 14.2 % (ref 11.5–14.5)
EST. GFR  (AFRICAN AMERICAN): >60 ML/MIN/1.73 M^2
EST. GFR  (NON AFRICAN AMERICAN): 56.1 ML/MIN/1.73 M^2
GLUCOSE SERPL-MCNC: 97 MG/DL (ref 70–110)
HCT VFR BLD AUTO: 41.4 % (ref 40–54)
HDLC SERPL-MCNC: 39 MG/DL (ref 40–75)
HDLC SERPL: 25.2 % (ref 20–50)
HGB BLD-MCNC: 13 G/DL (ref 14–18)
IMM GRANULOCYTES # BLD AUTO: 0.01 K/UL (ref 0–0.04)
IMM GRANULOCYTES NFR BLD AUTO: 0.2 % (ref 0–0.5)
LDLC SERPL CALC-MCNC: 103 MG/DL (ref 63–159)
LYMPHOCYTES # BLD AUTO: 2.2 K/UL (ref 1–4.8)
LYMPHOCYTES NFR BLD: 46.2 % (ref 18–48)
MCH RBC QN AUTO: 24.2 PG (ref 27–31)
MCHC RBC AUTO-ENTMCNC: 31.4 G/DL (ref 32–36)
MCV RBC AUTO: 77 FL (ref 82–98)
MONOCYTES # BLD AUTO: 0.4 K/UL (ref 0.3–1)
MONOCYTES NFR BLD: 9.5 % (ref 4–15)
NEUTROPHILS # BLD AUTO: 1.9 K/UL (ref 1.8–7.7)
NEUTROPHILS NFR BLD: 40.5 % (ref 38–73)
NONHDLC SERPL-MCNC: 116 MG/DL
NRBC BLD-RTO: 0 /100 WBC
PLATELET # BLD AUTO: 252 K/UL (ref 150–450)
PMV BLD AUTO: 9.8 FL (ref 9.2–12.9)
POTASSIUM SERPL-SCNC: 4.7 MMOL/L (ref 3.5–5.1)
PROT SERPL-MCNC: 6.8 G/DL (ref 6–8.4)
RBC # BLD AUTO: 5.37 M/UL (ref 4.6–6.2)
SODIUM SERPL-SCNC: 140 MMOL/L (ref 136–145)
TRIGL SERPL-MCNC: 65 MG/DL (ref 30–150)
WBC # BLD AUTO: 4.65 K/UL (ref 3.9–12.7)

## 2022-03-17 PROCEDURE — 84153 ASSAY OF PSA TOTAL: CPT | Performed by: INTERNAL MEDICINE

## 2022-03-17 PROCEDURE — 80053 COMPREHEN METABOLIC PANEL: CPT | Performed by: INTERNAL MEDICINE

## 2022-03-17 PROCEDURE — 36415 COLL VENOUS BLD VENIPUNCTURE: CPT | Mod: PN | Performed by: INTERNAL MEDICINE

## 2022-03-17 PROCEDURE — 80061 LIPID PANEL: CPT | Performed by: INTERNAL MEDICINE

## 2022-03-17 PROCEDURE — 85025 COMPLETE CBC W/AUTO DIFF WBC: CPT | Performed by: INTERNAL MEDICINE

## 2022-03-24 ENCOUNTER — OFFICE VISIT (OUTPATIENT)
Dept: INTERNAL MEDICINE | Facility: CLINIC | Age: 67
End: 2022-03-24
Payer: COMMERCIAL

## 2022-03-24 VITALS
SYSTOLIC BLOOD PRESSURE: 136 MMHG | TEMPERATURE: 97 F | HEART RATE: 65 BPM | HEIGHT: 71 IN | OXYGEN SATURATION: 98 % | WEIGHT: 218.94 LBS | BODY MASS INDEX: 30.65 KG/M2 | DIASTOLIC BLOOD PRESSURE: 82 MMHG | RESPIRATION RATE: 14 BRPM

## 2022-03-24 DIAGNOSIS — D56.1 THALASSEMIA, BETA: ICD-10-CM

## 2022-03-24 DIAGNOSIS — M17.11 PRIMARY OSTEOARTHRITIS OF RIGHT KNEE: ICD-10-CM

## 2022-03-24 DIAGNOSIS — E78.49 OTHER HYPERLIPIDEMIA: ICD-10-CM

## 2022-03-24 DIAGNOSIS — H93.11 TINNITUS OF RIGHT EAR: Chronic | ICD-10-CM

## 2022-03-24 DIAGNOSIS — Z01.818 PREOP EXAM FOR INTERNAL MEDICINE: Primary | ICD-10-CM

## 2022-03-24 DIAGNOSIS — I10 ESSENTIAL HYPERTENSION: ICD-10-CM

## 2022-03-24 DIAGNOSIS — E89.0 POSTOPERATIVE HYPOTHYROIDISM: ICD-10-CM

## 2022-03-24 PROCEDURE — 93010 ELECTROCARDIOGRAM REPORT: CPT | Mod: S$GLB,,, | Performed by: INTERNAL MEDICINE

## 2022-03-24 PROCEDURE — 1125F AMNT PAIN NOTED PAIN PRSNT: CPT | Mod: CPTII,S$GLB,, | Performed by: INTERNAL MEDICINE

## 2022-03-24 PROCEDURE — 99214 OFFICE O/P EST MOD 30 MIN: CPT | Mod: S$GLB,,, | Performed by: INTERNAL MEDICINE

## 2022-03-24 PROCEDURE — 1101F PR PT FALLS ASSESS DOC 0-1 FALLS W/OUT INJ PAST YR: ICD-10-PCS | Mod: CPTII,S$GLB,, | Performed by: INTERNAL MEDICINE

## 2022-03-24 PROCEDURE — 3288F PR FALLS RISK ASSESSMENT DOCUMENTED: ICD-10-PCS | Mod: CPTII,S$GLB,, | Performed by: INTERNAL MEDICINE

## 2022-03-24 PROCEDURE — 99214 PR OFFICE/OUTPT VISIT, EST, LEVL IV, 30-39 MIN: ICD-10-PCS | Mod: S$GLB,,, | Performed by: INTERNAL MEDICINE

## 2022-03-24 PROCEDURE — 3079F DIAST BP 80-89 MM HG: CPT | Mod: CPTII,S$GLB,, | Performed by: INTERNAL MEDICINE

## 2022-03-24 PROCEDURE — 3008F BODY MASS INDEX DOCD: CPT | Mod: CPTII,S$GLB,, | Performed by: INTERNAL MEDICINE

## 2022-03-24 PROCEDURE — 99999 PR PBB SHADOW E&M-EST. PATIENT-LVL V: CPT | Mod: PBBFAC,,, | Performed by: INTERNAL MEDICINE

## 2022-03-24 PROCEDURE — 1159F MED LIST DOCD IN RCRD: CPT | Mod: CPTII,S$GLB,, | Performed by: INTERNAL MEDICINE

## 2022-03-24 PROCEDURE — 1160F PR REVIEW ALL MEDS BY PRESCRIBER/CLIN PHARMACIST DOCUMENTED: ICD-10-PCS | Mod: CPTII,S$GLB,, | Performed by: INTERNAL MEDICINE

## 2022-03-24 PROCEDURE — 1157F PR ADVANCE CARE PLAN OR EQUIV PRESENT IN MEDICAL RECORD: ICD-10-PCS | Mod: CPTII,S$GLB,, | Performed by: INTERNAL MEDICINE

## 2022-03-24 PROCEDURE — 3075F PR MOST RECENT SYSTOLIC BLOOD PRESS GE 130-139MM HG: ICD-10-PCS | Mod: CPTII,S$GLB,, | Performed by: INTERNAL MEDICINE

## 2022-03-24 PROCEDURE — 1157F ADVNC CARE PLAN IN RCRD: CPT | Mod: CPTII,S$GLB,, | Performed by: INTERNAL MEDICINE

## 2022-03-24 PROCEDURE — 1160F RVW MEDS BY RX/DR IN RCRD: CPT | Mod: CPTII,S$GLB,, | Performed by: INTERNAL MEDICINE

## 2022-03-24 PROCEDURE — 4010F ACE/ARB THERAPY RXD/TAKEN: CPT | Mod: CPTII,S$GLB,, | Performed by: INTERNAL MEDICINE

## 2022-03-24 PROCEDURE — 3008F PR BODY MASS INDEX (BMI) DOCUMENTED: ICD-10-PCS | Mod: CPTII,S$GLB,, | Performed by: INTERNAL MEDICINE

## 2022-03-24 PROCEDURE — 1159F PR MEDICATION LIST DOCUMENTED IN MEDICAL RECORD: ICD-10-PCS | Mod: CPTII,S$GLB,, | Performed by: INTERNAL MEDICINE

## 2022-03-24 PROCEDURE — 3075F SYST BP GE 130 - 139MM HG: CPT | Mod: CPTII,S$GLB,, | Performed by: INTERNAL MEDICINE

## 2022-03-24 PROCEDURE — 1125F PR PAIN SEVERITY QUANTIFIED, PAIN PRESENT: ICD-10-PCS | Mod: CPTII,S$GLB,, | Performed by: INTERNAL MEDICINE

## 2022-03-24 PROCEDURE — 99999 PR PBB SHADOW E&M-EST. PATIENT-LVL V: ICD-10-PCS | Mod: PBBFAC,,, | Performed by: INTERNAL MEDICINE

## 2022-03-24 PROCEDURE — 93005 EKG 12-LEAD: ICD-10-PCS | Mod: S$GLB,,, | Performed by: INTERNAL MEDICINE

## 2022-03-24 PROCEDURE — 4010F PR ACE/ARB THEARPY RXD/TAKEN: ICD-10-PCS | Mod: CPTII,S$GLB,, | Performed by: INTERNAL MEDICINE

## 2022-03-24 PROCEDURE — 1101F PT FALLS ASSESS-DOCD LE1/YR: CPT | Mod: CPTII,S$GLB,, | Performed by: INTERNAL MEDICINE

## 2022-03-24 PROCEDURE — 3079F PR MOST RECENT DIASTOLIC BLOOD PRESSURE 80-89 MM HG: ICD-10-PCS | Mod: CPTII,S$GLB,, | Performed by: INTERNAL MEDICINE

## 2022-03-24 PROCEDURE — 93005 ELECTROCARDIOGRAM TRACING: CPT | Mod: S$GLB,,, | Performed by: INTERNAL MEDICINE

## 2022-03-24 PROCEDURE — 3288F FALL RISK ASSESSMENT DOCD: CPT | Mod: CPTII,S$GLB,, | Performed by: INTERNAL MEDICINE

## 2022-03-24 PROCEDURE — 93010 EKG 12-LEAD: ICD-10-PCS | Mod: S$GLB,,, | Performed by: INTERNAL MEDICINE

## 2022-03-24 RX ORDER — DORZOLAMIDE HYDROCHLORIDE AND TIMOLOL MALEATE 20; 5 MG/ML; MG/ML
SOLUTION/ DROPS OPHTHALMIC
COMMUNITY
Start: 2022-02-24

## 2022-03-29 ENCOUNTER — TELEPHONE (OUTPATIENT)
Dept: ENDOCRINOLOGY | Facility: CLINIC | Age: 67
End: 2022-03-29
Payer: COMMERCIAL

## 2022-03-29 NOTE — TELEPHONE ENCOUNTER
Left message for patient that Dr Montalvo just seen him an he is not due for any labs or U/S until next year 1-2023

## 2022-03-29 NOTE — TELEPHONE ENCOUNTER
----- Message from Kaycee Harding MA sent at 3/28/2022  3:57 PM CDT -----  Regarding: FW: call back  Contact: [phone]    ----- Message -----  From: Kate Hernandez  Sent: 3/28/2022  12:08 PM CDT  To: Katia Calhoun Staff  Subject: call back                                        Who Called: PT     Patient is calling to talk to nurse in regards to a letter he received to schedule an ultrasound and labs and orders are . Please advice

## 2022-03-30 NOTE — PROGRESS NOTES
Subjective:       Patient ID: Dani Gastelum Jr. is a 66 y.o. male.    Chief Complaint: Follow-up (6m)    HPI   The patient presents for preop medical clearance for right knee surgery.  The patient has osteoarthritis of the right knee.  He has not obtained adequate relief with conservative medical therapy.  Symptoms have been progressive.    Anesthesia history:  The patient has experienced postop urinary retention with 2 prior surgeries.; both surgeries required bladder catheterization due to inability of the patient to void.  No allergic reactions have been noted.    No known medication allergies.    Surgical history:  Thyroidectomy, vasectomy, right knee arthroscopy with medial meniscectomy, nasal septum surgery, right shoulder surgery, TURP.    Active medical conditions:  Hypertension, hyperlipidemia, BPH, intermittent vertigo, chronic rhinitis, beta thalassemia, arthritis.  The patient's exercise tolerance has only been limited by his knee condition.  He does not experience exertional chest pain or dyspnea.  No PND or orthopnea is noted.  No ankle edema is present.    Review of Systems   Constitutional: Negative for activity change, appetite change, fatigue and unexpected weight change.   HENT: Positive for tinnitus (Right ear is involved. ). Negative for sinus pressure/congestion and sore throat.    Eyes: Negative for visual disturbance.   Respiratory: Negative for cough, chest tightness, shortness of breath and wheezing.    Cardiovascular: Negative for chest pain, palpitations and leg swelling.   Gastrointestinal: Negative for abdominal pain, blood in stool, nausea and vomiting.   Genitourinary: Negative for dysuria, hematuria and urgency.   Musculoskeletal: Positive for arthralgias. Negative for back pain, gait problem, joint swelling, myalgias and neck stiffness.   Integumentary:  Negative for color change and rash.   Neurological: Positive for vertigo ( intermittent symptoms are noted.). Negative for  dizziness, syncope, weakness, light-headedness, numbness and headaches.   Psychiatric/Behavioral: Negative for sleep disturbance.              Physical Exam  Vitals and nursing note reviewed.   Constitutional:       General: He is not in acute distress.     Appearance: Normal appearance. He is well-developed.   HENT:      Head: Normocephalic and atraumatic.      Right Ear: External ear normal.      Left Ear: External ear normal.      Mouth/Throat:      Mouth: Mucous membranes are moist.      Pharynx: Oropharynx is clear. No oropharyngeal exudate.   Eyes:      General: No scleral icterus.     Extraocular Movements: Extraocular movements intact.      Conjunctiva/sclera: Conjunctivae normal.   Neck:      Thyroid: No thyromegaly.      Vascular: No JVD.   Cardiovascular:      Rate and Rhythm: Normal rate and regular rhythm.      Heart sounds: Normal heart sounds. No murmur heard.    No friction rub. No gallop.   Pulmonary:      Effort: Pulmonary effort is normal. No respiratory distress.      Breath sounds: Normal breath sounds. No wheezing or rales.   Abdominal:      General: Bowel sounds are normal. There is no distension.      Palpations: Abdomen is soft. There is no mass.      Tenderness: There is no abdominal tenderness. There is no guarding.   Musculoskeletal:         General: Tenderness present.      Cervical back: Normal range of motion and neck supple.      Right lower leg: No edema.      Left lower leg: No edema.      Comments: Right knee:  Hypertrophic joint changes are present.  No effusion is appreciated.  Decreased flexion is noted.   Lymphadenopathy:      Cervical: No cervical adenopathy.   Skin:     General: Skin is warm and dry.      Findings: No rash.   Neurological:      General: No focal deficit present.      Mental Status: He is alert and oriented to person, place, and time.      Cranial Nerves: No cranial nerve deficit.      Motor: No abnormal muscle tone.      Deep Tendon Reflexes: Reflexes are  normal and symmetric.   Psychiatric:         Mood and Affect: Mood normal.         Behavior: Behavior normal.         Thought Content: Thought content normal.           Lab Visit on 03/17/2022   Component Date Value Ref Range Status    Sodium 03/17/2022 140  136 - 145 mmol/L Final    Potassium 03/17/2022 4.7  3.5 - 5.1 mmol/L Final    Chloride 03/17/2022 104  95 - 110 mmol/L Final    CO2 03/17/2022 27  23 - 29 mmol/L Final    Glucose 03/17/2022 97  70 - 110 mg/dL Final    BUN 03/17/2022 16  8 - 23 mg/dL Final    Creatinine 03/17/2022 1.3  0.5 - 1.4 mg/dL Final    Calcium 03/17/2022 9.9  8.7 - 10.5 mg/dL Final    Total Protein 03/17/2022 6.8  6.0 - 8.4 g/dL Final    Albumin 03/17/2022 3.8  3.5 - 5.2 g/dL Final    Total Bilirubin 03/17/2022 0.9  0.1 - 1.0 mg/dL Final    Comment: For infants and newborns, interpretation of results should be based  on gestational age, weight and in agreement with clinical  observations.    Premature Infant recommended reference ranges:  Up to 24 hours.............<8.0 mg/dL  Up to 48 hours............<12.0 mg/dL  3-5 days..................<15.0 mg/dL  6-29 days.................<15.0 mg/dL      Alkaline Phosphatase 03/17/2022 43 (A) 55 - 135 U/L Final    AST 03/17/2022 21  10 - 40 U/L Final    ALT 03/17/2022 22  10 - 44 U/L Final    Anion Gap 03/17/2022 9  8 - 16 mmol/L Final    eGFR if African American 03/17/2022 >60.0  >60 mL/min/1.73 m^2 Final    eGFR if non  03/17/2022 56.1 (A) >60 mL/min/1.73 m^2 Final    Comment: Calculation used to obtain the estimated glomerular filtration  rate (eGFR) is the CKD-EPI equation.       WBC 03/17/2022 4.65  3.90 - 12.70 K/uL Final    RBC 03/17/2022 5.37  4.60 - 6.20 M/uL Final    Hemoglobin 03/17/2022 13.0 (A) 14.0 - 18.0 g/dL Final    Hematocrit 03/17/2022 41.4  40.0 - 54.0 % Final    MCV 03/17/2022 77 (A) 82 - 98 fL Final    MCH 03/17/2022 24.2 (A) 27.0 - 31.0 pg Final    MCHC 03/17/2022 31.4 (A) 32.0 - 36.0  g/dL Final    RDW 03/17/2022 14.2  11.5 - 14.5 % Final    Platelets 03/17/2022 252  150 - 450 K/uL Final    MPV 03/17/2022 9.8  9.2 - 12.9 fL Final    Immature Granulocytes 03/17/2022 0.2  0.0 - 0.5 % Final    Gran # (ANC) 03/17/2022 1.9  1.8 - 7.7 K/uL Final    Immature Grans (Abs) 03/17/2022 0.01  0.00 - 0.04 K/uL Final    Comment: Mild elevation in immature granulocytes is non specific and   can be seen in a variety of conditions including stress response,   acute inflammation, trauma and pregnancy. Correlation with other   laboratory and clinical findings is essential.      Lymph # 03/17/2022 2.2  1.0 - 4.8 K/uL Final    Mono # 03/17/2022 0.4  0.3 - 1.0 K/uL Final    Eos # 03/17/2022 0.1  0.0 - 0.5 K/uL Final    Baso # 03/17/2022 0.03  0.00 - 0.20 K/uL Final    nRBC 03/17/2022 0  0 /100 WBC Final    Gran % 03/17/2022 40.5  38.0 - 73.0 % Final    Lymph % 03/17/2022 46.2  18.0 - 48.0 % Final    Mono % 03/17/2022 9.5  4.0 - 15.0 % Final    Eosinophil % 03/17/2022 3.0  0.0 - 8.0 % Final    Basophil % 03/17/2022 0.6  0.0 - 1.9 % Final    Differential Method 03/17/2022 Automated   Final    Cholesterol 03/17/2022 155  120 - 199 mg/dL Final    Comment: The National Cholesterol Education Program (NCEP) has set the  following guidelines (reference ranges) for Cholesterol:  Optimal.....................<200 mg/dL  Borderline High.............200-239 mg/dL  High........................> or = 240 mg/dL      Triglycerides 03/17/2022 65  30 - 150 mg/dL Final    Comment: The National Cholesterol Education Program (NCEP) has set the  following guidelines (reference values) for triglycerides:  Normal......................<150 mg/dL  Borderline High.............150-199 mg/dL  High........................200-499 mg/dL      HDL 03/17/2022 39 (A) 40 - 75 mg/dL Final    Comment: The National Cholesterol Education Program (NCEP) has set the  following guidelines (reference values) for HDL  Cholesterol:  Low...............<40 mg/dL  Optimal...........>60 mg/dL      LDL Cholesterol 03/17/2022 103.0  63.0 - 159.0 mg/dL Final    Comment: The National Cholesterol Education Program (NCEP) has set the  following guidelines (reference values) for LDL Cholesterol:  Optimal.......................<130 mg/dL  Borderline High...............130-159 mg/dL  High..........................160-189 mg/dL  Very High.....................>190 mg/dL      HDL/Cholesterol Ratio 03/17/2022 25.2  20.0 - 50.0 % Final    Total Cholesterol/HDL Ratio 03/17/2022 4.0  2.0 - 5.0 Final    Non-HDL Cholesterol 03/17/2022 116  mg/dL Final    Comment: Risk category and Non-HDL cholesterol goals:  Coronary heart disease (CHD)or equivalent (10-year risk of CHD >20%):  Non-HDL cholesterol goal     <130 mg/dL  Two or more CHD risk factors and 10-year risk of CHD <= 20%:  Non-HDL cholesterol goal     <160 mg/dL  0 to 1 CHD risk factor:  Non-HDL cholesterol goal     <190 mg/dL      PSA Diagnostic 03/17/2022 0.89  0.00 - 4.00 ng/mL Final    Comment: PSA Expected levels:  Hormonal Therapy: <0.05 ng/ml  Prostatectomy: <0.01 ng/ml  Radiation Therapy: <1.00 ng/ml     Lab Visit on 01/20/2022   Component Date Value Ref Range Status    TSH 01/20/2022 0.988  0.400 - 4.000 uIU/mL Final    Thyroglobulin, Tumor Marker 01/20/2022 <0.1  ng/mL Final    Comment: -------------------REFERENCE VALUE--------------------------  Athyrotic <0.1   Intact Thyroid <=33      Thyroglobulin Antibody Screen 01/20/2022 <1.8  <1.8 IU/mL Final    Thyroglobulin Interpretation 01/20/2022 SEE BELOW   Final    Comment: Thyroglobulin (Tg) levels must be interpreted in the context  of TSH levels, serial Tg measurements and radioiodine   ablation status. Tg levels <0.1 ng/mL in athyrotic   individuals on suppressive therapy indicate a minimal risk  (<1-2%) of clinically detectable recurrent   papillary/follicular thyroid cancer.    -------------------ADDITIONAL  INFORMATION-------------------  PLEASE NOTE: A thyroglobulin antibody (TgAb) reference  cutoff of <4.0 IU/mL may be more suitable for the   evaluation of autoimmune thyroiditis.  Thyroglobulin flagging is based on athyrotic  reference values.    The thyroglobulin and thyroglobulin antibody testing   methods are immunoenzymatic assays manufactured by [employer] and performed on the [employer] .    Values obtained from different assay methods or kits  may be different and cannot be used interchangeably.    The results cannot be interpreted as absolute evidence  for the presence or absence of malignant disease.    Test Performed by:                             [facility]  [address] [place] [address]  : Pravin Mooney M.D. Ph.D.; CLIA# [id number]           EKG today shows a sinus bradycardia with ventricular rate of 58. Premature atrial complexes are present.  Otherwise normal EKG.    Assessment & Plan:      Dani was seen today for follow-up.  The patient is medically cleared for knee surgery.  The patient patient may take losartan and pravastatin the night before surgery as per his usual routine.  He should avoid use of aspirin and NSAIDs starting 7 days before surgery.  Anesthesia should be aware of the strong possibility of postop urinary retention which has occurred on 2 prior occasions following surgical procedures.  I anticipate the patient will likely need bladder catheterization postoperatively to handle urinary retention.    Diagnoses and all orders for this visit:    Preop exam for internal medicine  -     IN OFFICE EKG 12-LEAD (to Muse)    Primary osteoarthritis of right knee    Essential hypertension  -     Comprehensive Metabolic Panel; Future  -     Lipid Panel; Future  -     CBC Auto Differential; Future    Postoperative hypothyroidism    Thalassemia, beta    Other hyperlipidemia  -     Comprehensive Metabolic Panel;  Future  -     Lipid Panel; Future  -     CBC Auto Differential; Future    Tinnitus of right ear         Follow up in about 4 months (around 7/24/2022).     Frandy Hill MD

## 2022-04-22 ENCOUNTER — TELEPHONE (OUTPATIENT)
Dept: INTERNAL MEDICINE | Facility: CLINIC | Age: 67
End: 2022-04-22
Payer: COMMERCIAL

## 2022-04-22 NOTE — TELEPHONE ENCOUNTER
Faxed preop clearance to [facility], dr stoddard   Fax [phone]  Attn [email or web address].    With dictation, ekg and labs.  surg 5/2/22

## 2022-04-26 NOTE — TELEPHONE ENCOUNTER
No new care gaps identified.  Powered by [employer]. Reference number: [id number].   4/26/2022 6:42:46 PM CDT

## 2022-04-27 RX ORDER — LOSARTAN POTASSIUM 100 MG/1
TABLET ORAL
Qty: 30 TABLET | Refills: 3 | Status: SHIPPED | OUTPATIENT
Start: 2022-04-27 | End: 2022-04-27

## 2022-04-27 RX ORDER — LOSARTAN POTASSIUM 100 MG/1
100 TABLET ORAL DAILY
Qty: 90 TABLET | Refills: 3 | Status: SHIPPED | OUTPATIENT
Start: 2022-04-27 | End: 2022-09-29

## 2022-04-27 NOTE — TELEPHONE ENCOUNTER
Refill Authorization Note   Dani Gastelum  is requesting a refill authorization.  Brief Assessment and Rationale for Refill:  Approve     Medication Therapy Plan:       Medication Reconciliation Completed: No   Comments:     No Care Gaps recommended.     Note composed:11:19 AM 04/27/2022

## 2022-05-06 ENCOUNTER — TELEPHONE (OUTPATIENT)
Dept: INTERNAL MEDICINE | Facility: CLINIC | Age: 67
End: 2022-05-06
Payer: COMMERCIAL

## 2022-05-06 RX ORDER — AMLODIPINE BESYLATE 5 MG/1
5 TABLET ORAL DAILY
Qty: 30 TABLET | Refills: 3 | Status: SHIPPED | OUTPATIENT
Start: 2022-05-06 | End: 2023-02-10

## 2022-05-06 NOTE — TELEPHONE ENCOUNTER
A prescription order for amlodipine 5 mg once a day for blood pressure has been sent to the patient's [facility]'s pharmacy.  He should continue daily use of losartan as prescribed also for the blood pressure.

## 2022-05-06 NOTE — TELEPHONE ENCOUNTER
He currently takes losartan pm.    I told him to switch that start taking in am.  Use amlodpine pm.    He expressed understanding.

## 2022-05-06 NOTE — TELEPHONE ENCOUNTER
Surgery was this past Monday for knee replacement with dr stoddard.-   Says don't have a lot of pain from knee, just some discomfort from swelling.  Not taking any pain meds except celebrex 200 qd and used motrin 800mg once today.. and baby aspirin.    Only doing losartan 100mg daily for pressure..      Yesterday 160/82  152/92  And today very similar.   Feels a little headachy yesterday and today. He thinks from pressure.    2nd therapy session was today.     He is wondering if should add something more for blood pressure?  [facility] [place].     (hasnt had good bm since before surgery, says  Tried ducolax and hasn't helped bowels. I told him to try miralax tonite and if no bm in am do another round of miralax in am.  Take daily if not having bm on his own .)

## 2022-05-06 NOTE — TELEPHONE ENCOUNTER
----- Message from Marily Liz sent at 5/6/2022  1:02 PM CDT -----  Contact: Pt [phone]  Pt called and stated that he had a total knee replacement and his BP has been high and would like to know if you can increase it.     Please call

## 2022-07-21 ENCOUNTER — LAB VISIT (OUTPATIENT)
Dept: LAB | Facility: HOSPITAL | Age: 67
End: 2022-07-21
Attending: INTERNAL MEDICINE
Payer: COMMERCIAL

## 2022-07-21 DIAGNOSIS — E78.49 OTHER HYPERLIPIDEMIA: ICD-10-CM

## 2022-07-21 DIAGNOSIS — I10 ESSENTIAL HYPERTENSION: ICD-10-CM

## 2022-07-21 LAB
ALBUMIN SERPL BCP-MCNC: 3.5 G/DL (ref 3.5–5.2)
ALP SERPL-CCNC: 53 U/L (ref 55–135)
ALT SERPL W/O P-5'-P-CCNC: 16 U/L (ref 10–44)
ANION GAP SERPL CALC-SCNC: 9 MMOL/L (ref 8–16)
AST SERPL-CCNC: 15 U/L (ref 10–40)
BASOPHILS # BLD AUTO: 0.02 K/UL (ref 0–0.2)
BASOPHILS NFR BLD: 0.3 % (ref 0–1.9)
BILIRUB SERPL-MCNC: 0.7 MG/DL (ref 0.1–1)
BUN SERPL-MCNC: 17 MG/DL (ref 8–23)
CALCIUM SERPL-MCNC: 9.2 MG/DL (ref 8.7–10.5)
CHLORIDE SERPL-SCNC: 106 MMOL/L (ref 95–110)
CHOLEST SERPL-MCNC: 138 MG/DL (ref 120–199)
CHOLEST/HDLC SERPL: 4.8 {RATIO} (ref 2–5)
CO2 SERPL-SCNC: 25 MMOL/L (ref 23–29)
CREAT SERPL-MCNC: 1.3 MG/DL (ref 0.5–1.4)
DIFFERENTIAL METHOD: ABNORMAL
EOSINOPHIL # BLD AUTO: 0.3 K/UL (ref 0–0.5)
EOSINOPHIL NFR BLD: 3.5 % (ref 0–8)
ERYTHROCYTE [DISTWIDTH] IN BLOOD BY AUTOMATED COUNT: 13.9 % (ref 11.5–14.5)
EST. GFR  (AFRICAN AMERICAN): >60 ML/MIN/1.73 M^2
EST. GFR  (NON AFRICAN AMERICAN): 56.1 ML/MIN/1.73 M^2
GLUCOSE SERPL-MCNC: 92 MG/DL (ref 70–110)
HCT VFR BLD AUTO: 37.7 % (ref 40–54)
HDLC SERPL-MCNC: 29 MG/DL (ref 40–75)
HDLC SERPL: 21 % (ref 20–50)
HGB BLD-MCNC: 11.6 G/DL (ref 14–18)
IMM GRANULOCYTES # BLD AUTO: 0.01 K/UL (ref 0–0.04)
IMM GRANULOCYTES NFR BLD AUTO: 0.1 % (ref 0–0.5)
LDLC SERPL CALC-MCNC: 96.2 MG/DL (ref 63–159)
LYMPHOCYTES # BLD AUTO: 3.3 K/UL (ref 1–4.8)
LYMPHOCYTES NFR BLD: 45.7 % (ref 18–48)
MCH RBC QN AUTO: 24.1 PG (ref 27–31)
MCHC RBC AUTO-ENTMCNC: 30.8 G/DL (ref 32–36)
MCV RBC AUTO: 78 FL (ref 82–98)
MONOCYTES # BLD AUTO: 0.7 K/UL (ref 0.3–1)
MONOCYTES NFR BLD: 9.5 % (ref 4–15)
NEUTROPHILS # BLD AUTO: 2.9 K/UL (ref 1.8–7.7)
NEUTROPHILS NFR BLD: 40.9 % (ref 38–73)
NONHDLC SERPL-MCNC: 109 MG/DL
NRBC BLD-RTO: 0 /100 WBC
PLATELET # BLD AUTO: 389 K/UL (ref 150–450)
PMV BLD AUTO: 9.7 FL (ref 9.2–12.9)
POTASSIUM SERPL-SCNC: 4.1 MMOL/L (ref 3.5–5.1)
PROT SERPL-MCNC: 7.3 G/DL (ref 6–8.4)
RBC # BLD AUTO: 4.82 M/UL (ref 4.6–6.2)
SODIUM SERPL-SCNC: 140 MMOL/L (ref 136–145)
TRIGL SERPL-MCNC: 64 MG/DL (ref 30–150)
WBC # BLD AUTO: 7.17 K/UL (ref 3.9–12.7)

## 2022-07-21 PROCEDURE — 80053 COMPREHEN METABOLIC PANEL: CPT | Performed by: INTERNAL MEDICINE

## 2022-07-21 PROCEDURE — 80061 LIPID PANEL: CPT | Performed by: INTERNAL MEDICINE

## 2022-07-21 PROCEDURE — 85025 COMPLETE CBC W/AUTO DIFF WBC: CPT | Performed by: INTERNAL MEDICINE

## 2022-07-21 PROCEDURE — 36415 COLL VENOUS BLD VENIPUNCTURE: CPT | Mod: PN | Performed by: INTERNAL MEDICINE

## 2022-07-28 ENCOUNTER — OFFICE VISIT (OUTPATIENT)
Dept: INTERNAL MEDICINE | Facility: CLINIC | Age: 67
End: 2022-07-28
Payer: COMMERCIAL

## 2022-07-28 VITALS
HEIGHT: 71 IN | TEMPERATURE: 98 F | DIASTOLIC BLOOD PRESSURE: 82 MMHG | OXYGEN SATURATION: 100 % | HEART RATE: 63 BPM | SYSTOLIC BLOOD PRESSURE: 154 MMHG | RESPIRATION RATE: 16 BRPM | WEIGHT: 215.38 LBS | BODY MASS INDEX: 30.15 KG/M2

## 2022-07-28 DIAGNOSIS — Z85.46 HISTORY OF PROSTATE CANCER: ICD-10-CM

## 2022-07-28 DIAGNOSIS — I10 ESSENTIAL HYPERTENSION: Primary | ICD-10-CM

## 2022-07-28 DIAGNOSIS — H40.9 GLAUCOMA, UNSPECIFIED GLAUCOMA TYPE, UNSPECIFIED LATERALITY: ICD-10-CM

## 2022-07-28 DIAGNOSIS — D56.1 THALASSEMIA, BETA: ICD-10-CM

## 2022-07-28 DIAGNOSIS — E78.49 OTHER HYPERLIPIDEMIA: ICD-10-CM

## 2022-07-28 DIAGNOSIS — E89.0 POSTOPERATIVE HYPOTHYROIDISM: ICD-10-CM

## 2022-07-28 DIAGNOSIS — Z96.651 S/P TKR (TOTAL KNEE REPLACEMENT), RIGHT: ICD-10-CM

## 2022-07-28 PROCEDURE — 1160F RVW MEDS BY RX/DR IN RCRD: CPT | Mod: CPTII,S$GLB,, | Performed by: INTERNAL MEDICINE

## 2022-07-28 PROCEDURE — 1101F PT FALLS ASSESS-DOCD LE1/YR: CPT | Mod: CPTII,S$GLB,, | Performed by: INTERNAL MEDICINE

## 2022-07-28 PROCEDURE — 3079F DIAST BP 80-89 MM HG: CPT | Mod: CPTII,S$GLB,, | Performed by: INTERNAL MEDICINE

## 2022-07-28 PROCEDURE — 4010F ACE/ARB THERAPY RXD/TAKEN: CPT | Mod: CPTII,S$GLB,, | Performed by: INTERNAL MEDICINE

## 2022-07-28 PROCEDURE — 1125F AMNT PAIN NOTED PAIN PRSNT: CPT | Mod: CPTII,S$GLB,, | Performed by: INTERNAL MEDICINE

## 2022-07-28 PROCEDURE — 3079F PR MOST RECENT DIASTOLIC BLOOD PRESSURE 80-89 MM HG: ICD-10-PCS | Mod: CPTII,S$GLB,, | Performed by: INTERNAL MEDICINE

## 2022-07-28 PROCEDURE — 1101F PR PT FALLS ASSESS DOC 0-1 FALLS W/OUT INJ PAST YR: ICD-10-PCS | Mod: CPTII,S$GLB,, | Performed by: INTERNAL MEDICINE

## 2022-07-28 PROCEDURE — 1157F ADVNC CARE PLAN IN RCRD: CPT | Mod: CPTII,S$GLB,, | Performed by: INTERNAL MEDICINE

## 2022-07-28 PROCEDURE — 99499 RISK ADDL DX/OHS AUDIT: ICD-10-PCS | Mod: S$PBB,,, | Performed by: INTERNAL MEDICINE

## 2022-07-28 PROCEDURE — 3008F BODY MASS INDEX DOCD: CPT | Mod: CPTII,S$GLB,, | Performed by: INTERNAL MEDICINE

## 2022-07-28 PROCEDURE — 3288F FALL RISK ASSESSMENT DOCD: CPT | Mod: CPTII,S$GLB,, | Performed by: INTERNAL MEDICINE

## 2022-07-28 PROCEDURE — 4010F PR ACE/ARB THEARPY RXD/TAKEN: ICD-10-PCS | Mod: CPTII,S$GLB,, | Performed by: INTERNAL MEDICINE

## 2022-07-28 PROCEDURE — 1160F PR REVIEW ALL MEDS BY PRESCRIBER/CLIN PHARMACIST DOCUMENTED: ICD-10-PCS | Mod: CPTII,S$GLB,, | Performed by: INTERNAL MEDICINE

## 2022-07-28 PROCEDURE — 1159F MED LIST DOCD IN RCRD: CPT | Mod: CPTII,S$GLB,, | Performed by: INTERNAL MEDICINE

## 2022-07-28 PROCEDURE — 99213 OFFICE O/P EST LOW 20 MIN: CPT | Mod: S$GLB,,, | Performed by: INTERNAL MEDICINE

## 2022-07-28 PROCEDURE — 99213 PR OFFICE/OUTPT VISIT, EST, LEVL III, 20-29 MIN: ICD-10-PCS | Mod: S$GLB,,, | Performed by: INTERNAL MEDICINE

## 2022-07-28 PROCEDURE — 99999 PR PBB SHADOW E&M-EST. PATIENT-LVL V: CPT | Mod: PBBFAC,,, | Performed by: INTERNAL MEDICINE

## 2022-07-28 PROCEDURE — 3008F PR BODY MASS INDEX (BMI) DOCUMENTED: ICD-10-PCS | Mod: CPTII,S$GLB,, | Performed by: INTERNAL MEDICINE

## 2022-07-28 PROCEDURE — 3288F PR FALLS RISK ASSESSMENT DOCUMENTED: ICD-10-PCS | Mod: CPTII,S$GLB,, | Performed by: INTERNAL MEDICINE

## 2022-07-28 PROCEDURE — 1125F PR PAIN SEVERITY QUANTIFIED, PAIN PRESENT: ICD-10-PCS | Mod: CPTII,S$GLB,, | Performed by: INTERNAL MEDICINE

## 2022-07-28 PROCEDURE — 99999 PR PBB SHADOW E&M-EST. PATIENT-LVL V: ICD-10-PCS | Mod: PBBFAC,,, | Performed by: INTERNAL MEDICINE

## 2022-07-28 PROCEDURE — 99499 UNLISTED E&M SERVICE: CPT | Mod: S$PBB,,, | Performed by: INTERNAL MEDICINE

## 2022-07-28 PROCEDURE — 3077F SYST BP >= 140 MM HG: CPT | Mod: CPTII,S$GLB,, | Performed by: INTERNAL MEDICINE

## 2022-07-28 PROCEDURE — 1157F PR ADVANCE CARE PLAN OR EQUIV PRESENT IN MEDICAL RECORD: ICD-10-PCS | Mod: CPTII,S$GLB,, | Performed by: INTERNAL MEDICINE

## 2022-07-28 PROCEDURE — 1159F PR MEDICATION LIST DOCUMENTED IN MEDICAL RECORD: ICD-10-PCS | Mod: CPTII,S$GLB,, | Performed by: INTERNAL MEDICINE

## 2022-07-28 PROCEDURE — 3077F PR MOST RECENT SYSTOLIC BLOOD PRESSURE >= 140 MM HG: ICD-10-PCS | Mod: CPTII,S$GLB,, | Performed by: INTERNAL MEDICINE

## 2022-07-28 NOTE — PROGRESS NOTES
Subjective:       Patient ID: Dani Gastelum Jr. is a 66 y.o. male.    Chief Complaint: Follow-up and Knee Pain (Right )    HPI     The patient presents for follow-up post right total knee replacement surgery performed on 05/02/2022 at [facility] by Dr. Herrera.  The patient reports he is continuing to progress well since his surgery.  He still notes right knee swelling, pain, and localized numbness.  He is not using any assistance devices for ambulation.  He denies having any chills, fever, shortness of breath, or chest pain.  He is not using any opioid medication at the present time.  He has completed outpatient physical therapy and is working out at his local gym for his routine exercises.    The patient is status post COVID- 19 infection diagnosed on 07/11/2022.  He states he has fully recovered.  She    Active medical conditions include hypertension UE, chronic rhinitis, osteoarthritis of the knees, glaucoma, beta thalassemia, BPH.  The patient states he ventral a will have to establish with a new ophthalmologist for management of his glaucoma since his current provider is not participating in his health plan.    Review of Systems   Constitutional: Negative for activity change, appetite change, fatigue and unexpected weight change.   HENT: Negative for sinus pressure/congestion and sore throat.    Eyes: Negative for visual disturbance.   Respiratory: Negative for cough, chest tightness, shortness of breath and wheezing.    Cardiovascular: Negative for chest pain, palpitations and leg swelling.   Gastrointestinal: Negative for abdominal pain, blood in stool, nausea and vomiting.   Genitourinary: Negative for dysuria, hematuria and urgency.   Musculoskeletal: Positive for arthralgias and joint swelling. Negative for back pain, gait problem, myalgias and neck stiffness.   Integumentary:  Negative for color change and rash.   Neurological: Negative for dizziness, syncope, weakness, light-headedness, numbness and  headaches.   Psychiatric/Behavioral: Negative for sleep disturbance.            Physical Exam  Vitals and nursing note reviewed.   Constitutional:       General: He is not in acute distress.     Appearance: He is well-developed.   HENT:      Head: Normocephalic and atraumatic.   Eyes:      General: No scleral icterus.     Conjunctiva/sclera: Conjunctivae normal.   Neck:      Thyroid: No thyromegaly.      Vascular: No JVD.   Cardiovascular:      Rate and Rhythm: Normal rate and regular rhythm.      Heart sounds: Normal heart sounds. No murmur heard.    No friction rub. No gallop.   Pulmonary:      Effort: Pulmonary effort is normal. No respiratory distress.      Breath sounds: Normal breath sounds. No wheezing or rales.   Abdominal:      General: Bowel sounds are normal.      Palpations: Abdomen is soft. There is no mass.      Tenderness: There is no abdominal tenderness.   Musculoskeletal:         General: Tenderness present.      Cervical back: Normal range of motion and neck supple.      Comments: Right knee:   A pull on compression knee support is in place.  Postop swelling is present.  Minimal tenderness is noted along the lateral aspect of the knee joint. Right knee flexion to 120° is noted.    Left knee: No effusion is present.  Crepitus is present on range-of-motion testing.  Mild tenderness is noted.       Lymphadenopathy:      Cervical: No cervical adenopathy.   Skin:     General: Skin is warm and dry.      Findings: No rash.   Neurological:      Mental Status: He is alert and oriented to person, place, and time.      Comments: Gait Exhibits a mild limp on the right side.  No ataxia is present.   Psychiatric:         Mood and Affect: Mood normal.         Behavior: Behavior normal.           Lab Visit on 07/21/2022   Component Date Value Ref Range Status    Sodium 07/21/2022 140  136 - 145 mmol/L Final    Potassium 07/21/2022 4.1  3.5 - 5.1 mmol/L Final    Chloride 07/21/2022 106  95 - 110 mmol/L Final     CO2 07/21/2022 25  23 - 29 mmol/L Final    Glucose 07/21/2022 92  70 - 110 mg/dL Final    BUN 07/21/2022 17  8 - 23 mg/dL Final    Creatinine 07/21/2022 1.3  0.5 - 1.4 mg/dL Final    Calcium 07/21/2022 9.2  8.7 - 10.5 mg/dL Final    Total Protein 07/21/2022 7.3  6.0 - 8.4 g/dL Final    Albumin 07/21/2022 3.5  3.5 - 5.2 g/dL Final    Total Bilirubin 07/21/2022 0.7  0.1 - 1.0 mg/dL Final    Comment: For infants and newborns, interpretation of results should be based  on gestational age, weight and in agreement with clinical  observations.    Premature Infant recommended reference ranges:  Up to 24 hours.............<8.0 mg/dL  Up to 48 hours............<12.0 mg/dL  3-5 days..................<15.0 mg/dL  6-29 days.................<15.0 mg/dL      Alkaline Phosphatase 07/21/2022 53 (A) 55 - 135 U/L Final    AST 07/21/2022 15  10 - 40 U/L Final    ALT 07/21/2022 16  10 - 44 U/L Final    Anion Gap 07/21/2022 9  8 - 16 mmol/L Final    eGFR if African American 07/21/2022 >60.0  >60 mL/min/1.73 m^2 Final    eGFR if non  07/21/2022 56.1 (A) >60 mL/min/1.73 m^2 Final    Comment: Calculation used to obtain the estimated glomerular filtration  rate (eGFR) is the CKD-EPI equation.       Cholesterol 07/21/2022 138  120 - 199 mg/dL Final    Comment: The National Cholesterol Education Program (NCEP) has set the  following guidelines (reference ranges) for Cholesterol:  Optimal.....................<200 mg/dL  Borderline High.............200-239 mg/dL  High........................> or = 240 mg/dL      Triglycerides 07/21/2022 64  30 - 150 mg/dL Final    Comment: The National Cholesterol Education Program (NCEP) has set the  following guidelines (reference values) for triglycerides:  Normal......................<150 mg/dL  Borderline High.............150-199 mg/dL  High........................200-499 mg/dL      HDL 07/21/2022 29 (A) 40 - 75 mg/dL Final    Comment: The National Cholesterol Education  Program (NCEP) has set the  following guidelines (reference values) for HDL Cholesterol:  Low...............<40 mg/dL  Optimal...........>60 mg/dL      LDL Cholesterol 07/21/2022 96.2  63.0 - 159.0 mg/dL Final    Comment: The National Cholesterol Education Program (NCEP) has set the  following guidelines (reference values) for LDL Cholesterol:  Optimal.......................<130 mg/dL  Borderline High...............130-159 mg/dL  High..........................160-189 mg/dL  Very High.....................>190 mg/dL      HDL/Cholesterol Ratio 07/21/2022 21.0  20.0 - 50.0 % Final    Total Cholesterol/HDL Ratio 07/21/2022 4.8  2.0 - 5.0 Final    Non-HDL Cholesterol 07/21/2022 109  mg/dL Final    Comment: Risk category and Non-HDL cholesterol goals:  Coronary heart disease (CHD)or equivalent (10-year risk of CHD >20%):  Non-HDL cholesterol goal     <130 mg/dL  Two or more CHD risk factors and 10-year risk of CHD <= 20%:  Non-HDL cholesterol goal     <160 mg/dL  0 to 1 CHD risk factor:  Non-HDL cholesterol goal     <190 mg/dL      WBC 07/21/2022 7.17  3.90 - 12.70 K/uL Final    RBC 07/21/2022 4.82  4.60 - 6.20 M/uL Final    Hemoglobin 07/21/2022 11.6 (A) 14.0 - 18.0 g/dL Final    Hematocrit 07/21/2022 37.7 (A) 40.0 - 54.0 % Final    MCV 07/21/2022 78 (A) 82 - 98 fL Final    MCH 07/21/2022 24.1 (A) 27.0 - 31.0 pg Final    MCHC 07/21/2022 30.8 (A) 32.0 - 36.0 g/dL Final    RDW 07/21/2022 13.9  11.5 - 14.5 % Final    Platelets 07/21/2022 389  150 - 450 K/uL Final    MPV 07/21/2022 9.7  9.2 - 12.9 fL Final    Immature Granulocytes 07/21/2022 0.1  0.0 - 0.5 % Final    Gran # (ANC) 07/21/2022 2.9  1.8 - 7.7 K/uL Final    Immature Grans (Abs) 07/21/2022 0.01  0.00 - 0.04 K/uL Final    Comment: Mild elevation in immature granulocytes is non specific and   can be seen in a variety of conditions including stress response,   acute inflammation, trauma and pregnancy. Correlation with other   laboratory and clinical  findings is essential.      Lymph # 07/21/2022 3.3  1.0 - 4.8 K/uL Final    Mono # 07/21/2022 0.7  0.3 - 1.0 K/uL Final    Eos # 07/21/2022 0.3  0.0 - 0.5 K/uL Final    Baso # 07/21/2022 0.02  0.00 - 0.20 K/uL Final    nRBC 07/21/2022 0  0 /100 WBC Final    Gran % 07/21/2022 40.9  38.0 - 73.0 % Final    Lymph % 07/21/2022 45.7  18.0 - 48.0 % Final    Mono % 07/21/2022 9.5  4.0 - 15.0 % Final    Eosinophil % 07/21/2022 3.5  0.0 - 8.0 % Final    Basophil % 07/21/2022 0.3  0.0 - 1.9 % Final    Differential Method 07/21/2022 Automated   Final       Assessment & Plan:      Dani was seen today for follow-up and knee pain. Current medications will be continued.  The patient may continue his current exercise workouts.    Paperwork for a handicap parking sticker was completed for the patient today.    Diagnoses and all orders for this visit:    Essential hypertension    S/P TKR (total knee replacement), right    Postoperative hypothyroidism    Other hyperlipidemia    Thalassemia, beta    Glaucoma, unspecified glaucoma type, unspecified laterality  -     Cancel: Ambulatory referral/consult to Ophthalmology; Future         Follow up in about 6 months (around 1/28/2023).     Frandy Hill MD

## 2022-08-03 ENCOUNTER — HOSPITAL ENCOUNTER (OUTPATIENT)
Dept: ENDOCRINOLOGY | Facility: CLINIC | Age: 67
Discharge: HOME | End: 2022-08-03
Attending: INTERNAL MEDICINE
Payer: COMMERCIAL

## 2022-08-03 DIAGNOSIS — C73 PAPILLARY THYROID CARCINOMA: ICD-10-CM

## 2022-08-03 PROCEDURE — 76536 US SOFT TISSUE HEAD NECK THYROID: ICD-10-PCS | Mod: S$GLB,,, | Performed by: INTERNAL MEDICINE

## 2022-08-03 PROCEDURE — 76536 US EXAM OF HEAD AND NECK: CPT | Mod: S$GLB,,, | Performed by: INTERNAL MEDICINE

## 2022-08-04 ENCOUNTER — TELEPHONE (OUTPATIENT)
Dept: ENDOCRINOLOGY | Facility: CLINIC | Age: 67
End: 2022-08-04

## 2022-08-04 DIAGNOSIS — C73 PAPILLARY THYROID CARCINOMA: Primary | ICD-10-CM

## 2022-08-09 ENCOUNTER — PATIENT MESSAGE (OUTPATIENT)
Dept: ADMINISTRATIVE | Facility: HOSPITAL | Age: 67
End: 2022-08-09

## 2022-09-23 ENCOUNTER — PATIENT MESSAGE (OUTPATIENT)
Dept: ADMINISTRATIVE | Facility: HOSPITAL | Age: 67
End: 2022-09-23
Payer: MEDICARE

## 2022-09-23 ENCOUNTER — PATIENT OUTREACH (OUTPATIENT)
Dept: ADMINISTRATIVE | Facility: HOSPITAL | Age: 67
End: 2022-09-23
Payer: MEDICARE

## 2022-09-29 RX ORDER — LOSARTAN POTASSIUM 100 MG/1
TABLET ORAL
Qty: 30 TABLET | Refills: 11 | Status: SHIPPED | OUTPATIENT
Start: 2022-09-29 | End: 2023-06-07 | Stop reason: SDUPTHER

## 2022-09-29 NOTE — TELEPHONE ENCOUNTER
No new care gaps identified.  [facility] Embedded Care Gaps. Reference number: [id number]. 9/29/2022   10:17:57 AM SHUT

## 2022-09-29 NOTE — TELEPHONE ENCOUNTER
Refill Routing Note   Medication(s) are not appropriate for processing by [facility] for the following reason(s):      - Required vitals are abnormal    [facility] action(s):  Defer          Medication reconciliation completed: No     Appointments  past 12m or future 3m with PCP    Date Provider   Last Visit   7/28/2022 Frandy Hill MD   Next Visit   Visit date not found Frandy Hill MD   ED visits in past 90 days: 0        Note composed:10:41 AM 09/29/2022

## 2022-11-18 NOTE — PATIENT INSTRUCTIONS
"The patient was given our "Understanding Thyroid Disease" booklet and directed to the [employer] patient education portal as a resource.  "
Yes

## 2022-12-07 ENCOUNTER — LAB VISIT (OUTPATIENT)
Dept: LAB | Facility: HOSPITAL | Age: 67
End: 2022-12-07
Attending: INTERNAL MEDICINE
Payer: MEDICARE

## 2022-12-07 DIAGNOSIS — C73 PAPILLARY THYROID CARCINOMA: ICD-10-CM

## 2022-12-07 LAB — TSH SERPL DL<=0.005 MIU/L-ACNC: 1.85 UIU/ML (ref 0.4–4)

## 2022-12-07 PROCEDURE — 36415 COLL VENOUS BLD VENIPUNCTURE: CPT | Mod: PN | Performed by: INTERNAL MEDICINE

## 2022-12-07 PROCEDURE — 86800 THYROGLOBULIN ANTIBODY: CPT | Performed by: INTERNAL MEDICINE

## 2022-12-07 PROCEDURE — 84443 ASSAY THYROID STIM HORMONE: CPT | Performed by: INTERNAL MEDICINE

## 2022-12-22 ENCOUNTER — TELEPHONE (OUTPATIENT)
Dept: INTERNAL MEDICINE | Facility: CLINIC | Age: 67
End: 2022-12-22
Payer: MEDICARE

## 2022-12-22 ENCOUNTER — OFFICE VISIT (OUTPATIENT)
Dept: ENDOCRINOLOGY | Facility: CLINIC | Age: 67
End: 2022-12-22
Payer: MEDICARE

## 2022-12-22 VITALS
HEART RATE: 64 BPM | SYSTOLIC BLOOD PRESSURE: 130 MMHG | BODY MASS INDEX: 31.24 KG/M2 | WEIGHT: 223.13 LBS | HEIGHT: 71 IN | DIASTOLIC BLOOD PRESSURE: 70 MMHG | OXYGEN SATURATION: 97 %

## 2022-12-22 DIAGNOSIS — E89.0 POSTOPERATIVE HYPOTHYROIDISM: ICD-10-CM

## 2022-12-22 DIAGNOSIS — I10 ESSENTIAL HYPERTENSION: Chronic | ICD-10-CM

## 2022-12-22 DIAGNOSIS — H93.19 TINNITUS, UNSPECIFIED LATERALITY: Primary | ICD-10-CM

## 2022-12-22 DIAGNOSIS — H91.90 HEARING LOSS, UNSPECIFIED HEARING LOSS TYPE, UNSPECIFIED LATERALITY: ICD-10-CM

## 2022-12-22 DIAGNOSIS — C73 PAPILLARY THYROID CARCINOMA: Primary | ICD-10-CM

## 2022-12-22 PROCEDURE — 1160F PR REVIEW ALL MEDS BY PRESCRIBER/CLIN PHARMACIST DOCUMENTED: ICD-10-PCS | Mod: CPTII,S$GLB,, | Performed by: INTERNAL MEDICINE

## 2022-12-22 PROCEDURE — 3078F PR MOST RECENT DIASTOLIC BLOOD PRESSURE < 80 MM HG: ICD-10-PCS | Mod: CPTII,S$GLB,, | Performed by: INTERNAL MEDICINE

## 2022-12-22 PROCEDURE — 4010F ACE/ARB THERAPY RXD/TAKEN: CPT | Mod: CPTII,S$GLB,, | Performed by: INTERNAL MEDICINE

## 2022-12-22 PROCEDURE — 1101F PR PT FALLS ASSESS DOC 0-1 FALLS W/OUT INJ PAST YR: ICD-10-PCS | Mod: CPTII,S$GLB,, | Performed by: INTERNAL MEDICINE

## 2022-12-22 PROCEDURE — 1101F PT FALLS ASSESS-DOCD LE1/YR: CPT | Mod: CPTII,S$GLB,, | Performed by: INTERNAL MEDICINE

## 2022-12-22 PROCEDURE — 4010F PR ACE/ARB THEARPY RXD/TAKEN: ICD-10-PCS | Mod: CPTII,S$GLB,, | Performed by: INTERNAL MEDICINE

## 2022-12-22 PROCEDURE — 1157F PR ADVANCE CARE PLAN OR EQUIV PRESENT IN MEDICAL RECORD: ICD-10-PCS | Mod: CPTII,S$GLB,, | Performed by: INTERNAL MEDICINE

## 2022-12-22 PROCEDURE — 1160F RVW MEDS BY RX/DR IN RCRD: CPT | Mod: CPTII,S$GLB,, | Performed by: INTERNAL MEDICINE

## 2022-12-22 PROCEDURE — 3008F PR BODY MASS INDEX (BMI) DOCUMENTED: ICD-10-PCS | Mod: CPTII,S$GLB,, | Performed by: INTERNAL MEDICINE

## 2022-12-22 PROCEDURE — 1159F PR MEDICATION LIST DOCUMENTED IN MEDICAL RECORD: ICD-10-PCS | Mod: CPTII,S$GLB,, | Performed by: INTERNAL MEDICINE

## 2022-12-22 PROCEDURE — 99214 PR OFFICE/OUTPT VISIT, EST, LEVL IV, 30-39 MIN: ICD-10-PCS | Mod: S$GLB,,, | Performed by: INTERNAL MEDICINE

## 2022-12-22 PROCEDURE — 3288F FALL RISK ASSESSMENT DOCD: CPT | Mod: CPTII,S$GLB,, | Performed by: INTERNAL MEDICINE

## 2022-12-22 PROCEDURE — 99999 PR PBB SHADOW E&M-EST. PATIENT-LVL IV: ICD-10-PCS | Mod: PBBFAC,,, | Performed by: INTERNAL MEDICINE

## 2022-12-22 PROCEDURE — 3075F PR MOST RECENT SYSTOLIC BLOOD PRESS GE 130-139MM HG: ICD-10-PCS | Mod: CPTII,S$GLB,, | Performed by: INTERNAL MEDICINE

## 2022-12-22 PROCEDURE — 99214 OFFICE O/P EST MOD 30 MIN: CPT | Mod: S$GLB,,, | Performed by: INTERNAL MEDICINE

## 2022-12-22 PROCEDURE — 1126F AMNT PAIN NOTED NONE PRSNT: CPT | Mod: CPTII,S$GLB,, | Performed by: INTERNAL MEDICINE

## 2022-12-22 PROCEDURE — 1159F MED LIST DOCD IN RCRD: CPT | Mod: CPTII,S$GLB,, | Performed by: INTERNAL MEDICINE

## 2022-12-22 PROCEDURE — 3075F SYST BP GE 130 - 139MM HG: CPT | Mod: CPTII,S$GLB,, | Performed by: INTERNAL MEDICINE

## 2022-12-22 PROCEDURE — 3008F BODY MASS INDEX DOCD: CPT | Mod: CPTII,S$GLB,, | Performed by: INTERNAL MEDICINE

## 2022-12-22 PROCEDURE — 3078F DIAST BP <80 MM HG: CPT | Mod: CPTII,S$GLB,, | Performed by: INTERNAL MEDICINE

## 2022-12-22 PROCEDURE — 99999 PR PBB SHADOW E&M-EST. PATIENT-LVL IV: CPT | Mod: PBBFAC,,, | Performed by: INTERNAL MEDICINE

## 2022-12-22 PROCEDURE — 1126F PR PAIN SEVERITY QUANTIFIED, NO PAIN PRESENT: ICD-10-PCS | Mod: CPTII,S$GLB,, | Performed by: INTERNAL MEDICINE

## 2022-12-22 PROCEDURE — 3288F PR FALLS RISK ASSESSMENT DOCUMENTED: ICD-10-PCS | Mod: CPTII,S$GLB,, | Performed by: INTERNAL MEDICINE

## 2022-12-22 PROCEDURE — 1157F ADVNC CARE PLAN IN RCRD: CPT | Mod: CPTII,S$GLB,, | Performed by: INTERNAL MEDICINE

## 2022-12-22 RX ORDER — LEVOTHYROXINE SODIUM 175 UG/1
175 TABLET ORAL
Qty: 90 TABLET | Refills: 3 | Status: SHIPPED | OUTPATIENT
Start: 2022-12-22 | End: 2023-05-01 | Stop reason: SDUPTHER

## 2022-12-22 NOTE — ASSESSMENT & PLAN NOTE
--On single agent losartan  --Bp at goal on current regimen  --He reports his bp has been persistently elevated on his home bp cuff  --He feels his home cuff may be innacurate  --He will discuss with his PCP at upcoming visit to see if he is a candidate for digital HTN

## 2022-12-22 NOTE — PROGRESS NOTES
"Subjective:      Patient ID: Dani Gastelum Jr. is a 67 y.o. male.    Chief Complaint:  Hypothyroidism      History of Present Illness  66 yo male w/ diagnosis of PTC, HTN, HLD and Beta thalasemia that presents to the endocrine clinic for follow up of thyroid cancer and hypothyroidism. Last visit 1/2022.     In regards to thyroid CA:    -Thyroid US 12/19:  MNG w/ largest nodule on Left thyroid meassuring 1.7 x 1.1 x 1.7 cm, (7) hypoechoic solid-appearing w/ blurred margins and a 1.5cm complex predominantly solid left thyroid nodule. The right thyroid nodules are sub-centimeter   -FNA  On 3/20 of Left inferior 1.7cm thyroid nodule:  Positive for Papillary thyroid Carcinoma   -No Lymph node evaluation on Thyroid US done on 12/19      S/p total thyroidectomy with left central neck dissection on 10/16/2020:  1. Thyroid, total thyroidectomy:  - Papillary thyroid carcinoma, 2.5 cm (pT2)  - Margin uninvolved by carcinoma (0.1 cm to thyroid surface)  - Negative for lymphatic invasion or angioinvasion  - Negative for perineural invasion  - Adenomatoid nodules  - Biopsy site changes identified  - Two benign lymph node, negative for metastatic carcinoma (0/2)  2. Lymph nodes, designated "Left central neck contents", neck dissection:  - Fourteen benign lymph nodes, negative for metastatic carcinoma (0/14)     Currently taking levothyroxine 150 mcg PO daily.   Takes thyroid hormone on empty stomach and hasn't missed any doses.   Has had more fatigue recently.  BM's regular. No heat or cold intolerance.   Denies heart palpitations and tremor.     He has difficulty losing weight. Exercises at least 4-5 days per week.      He's noted some mild dysphagia to large pills, but no other issues and no choking. Denies any voice changes.      Latest Reference Range & Units 06/24/21 09:30 01/20/22 09:35 12/07/22 09:15   TSH 0.400 - 4.000 uIU/mL 1.143 0.988 1.846   Thyroglobulin Antibody Screen <1.8 IU/mL <1.8 <1.8 <1.8   Thyroglobulin, Tumor " Marker ng/mL <0.1 <0.1 <0.1       Review of Systems   Constitutional:  Negative for chills and fever.   Gastrointestinal:  Negative for nausea and vomiting.     Objective:   Physical Exam  Vitals and nursing note reviewed.   No thyroid tissue palpated  No cervical adenopathy  No tremor  No pedal edema    BP Readings from Last 3 Encounters:   12/22/22 130/70   07/28/22 (!) 154/82   03/24/22 136/82     Wt Readings from Last 1 Encounters:   12/22/22 0943 101.2 kg (223 lb 1.7 oz)       Body mass index is 31.12 kg/m².    Lab Review:   No results found for: HGBA1C  Lab Results   Component Value Date    CHOL 138 07/21/2022    HDL 29 (L) 07/21/2022    LDLCALC 96.2 07/21/2022    TRIG 64 07/21/2022    CHOLHDL 21.0 07/21/2022     Lab Results   Component Value Date     07/21/2022    K 4.1 07/21/2022     07/21/2022    CO2 25 07/21/2022    GLU 92 07/21/2022    BUN 17 07/21/2022    CREATININE 1.3 07/21/2022    CALCIUM 9.2 07/21/2022    PROT 7.3 07/21/2022    ALBUMIN 3.5 07/21/2022    BILITOT 0.7 07/21/2022    ALKPHOS 53 (L) 07/21/2022    AST 15 07/21/2022    ALT 16 07/21/2022    ANIONGAP 9 07/21/2022    ESTGFRAFRICA >60.0 07/21/2022    EGFRNONAA 56.1 (A) 07/21/2022    TSH 1.846 12/07/2022         Assessment and Plan     Papillary thyroid carcinoma  --patient s/p total thyroidectomy with left CN neck dissection on 10/16/2020 for unifocal papillary thyroid cancer with 0/14 LN positive  --Stage 1, CASI low risk for recurrence  --Reviewed that he is low risk for recurrence and I would not recommend OLIVEIRA ablation   --Post-op Tg level remains undetectable  --Post-op neck ultrasound  In 8/2022 was normal  --Will repeat Tg in one year  --As long as Tg remains undetectable can hold off on USS since he is >2 years out from surgery with undetectable Tg and normal USS findings    Postoperative hypothyroidism  --patient with postoperative hypothyroidism  --Clinically having some fatigue, unclear if thyroid related  --Goal TSH  0.4-2.0  --Recent TSH at goal but we do have room to increase his dose  --Increase levothyroxine to 175 mcg once daily  --Repeat TSH in 6 weeks    Essential hypertension  --On single agent losartan  --Bp at goal on current regimen  --He reports his bp has been persistently elevated on his home bp cuff  --He feels his home cuff may be innacurate  --He will discuss with his PCP at upcoming visit to see if he is a candidate for digital HTN       Needs TSH added to PCP labs at end of January, follow up in one year with TSH and thyroglobulin prior to appt      Lj Montalvo M.D. Staff Endocrinology

## 2022-12-22 NOTE — TELEPHONE ENCOUNTER
----- Message from Darius Tremayne Brown sent at 12/22/2022  1:16 PM CST -----  Regarding: orders  Contact: pt @ [phone]  ENT     Caller is requesting to schedule their ENT appointment.  Order is not listed in EPIC.  Please enter order and contact patient to schedule.    Name of Caller:Dani Gastelum Jr.     Where would they like the Audiogram performed?[place]     Would the patient rather a call back or a response via [facility]? Call Back     Best Call Back Number:[phone]    Additional Information: A referral is required by [employer] [facility]. C/O Ringing in Ears, Hearing Loss Greater than 48 HRS and Ear Cleaning       
Requesting ent referral for ringing in ears and hearing loss.    Referral pended, edit if need.    Please advise signed.    Thanks daniel  
unable to assess

## 2022-12-22 NOTE — ASSESSMENT & PLAN NOTE
--patient s/p total thyroidectomy with left CN neck dissection on 10/16/2020 for unifocal papillary thyroid cancer with 0/14 LN positive  --Stage 1, CASI low risk for recurrence  --Reviewed that he is low risk for recurrence and I would not recommend OLIVEIRA ablation   --Post-op Tg level remains undetectable  --Post-op neck ultrasound  In 8/2022 was normal  --Will repeat Tg in one year  --As long as Tg remains undetectable can hold off on USS since he is >2 years out from surgery with undetectable Tg and normal USS findings

## 2022-12-22 NOTE — ASSESSMENT & PLAN NOTE
--patient with postoperative hypothyroidism  --Clinically having some fatigue, unclear if thyroid related  --Goal TSH 0.4-2.0  --Recent TSH at goal but we do have room to increase his dose  --Increase levothyroxine to 175 mcg once daily  --Repeat TSH in 6 weeks

## 2023-01-11 ENCOUNTER — TELEPHONE (OUTPATIENT)
Dept: OTOLARYNGOLOGY | Facility: CLINIC | Age: 68
End: 2023-01-11
Payer: MEDICARE

## 2023-01-11 NOTE — TELEPHONE ENCOUNTER
Returned call to patient to schedule appointment. Patient only wanted to be seen in [place]. Offered patient first available appointment on May 11 for cerumen impaction. Asked patient about referral for tinnitus and hearing loss and the patient stated he wanted to be seen for an ear cleaning. Patient accepted appointment and appt was added to wait list. Was thanked for call   ----- Message from Linnea Cazares sent at 1/11/2023  1:43 PM CST -----  Contact: pt @ [phone]  Dani Gastelum calling regarding Appointment Access  (message) for #pt is calling to get np appt on the same day, pt has referral in chart. Asking for call back

## 2023-01-30 ENCOUNTER — LAB VISIT (OUTPATIENT)
Dept: LAB | Facility: HOSPITAL | Age: 68
End: 2023-01-30
Attending: INTERNAL MEDICINE
Payer: MEDICARE

## 2023-01-30 DIAGNOSIS — E78.49 OTHER HYPERLIPIDEMIA: ICD-10-CM

## 2023-01-30 DIAGNOSIS — E89.0 POSTOPERATIVE HYPOTHYROIDISM: ICD-10-CM

## 2023-01-30 DIAGNOSIS — Z85.46 HISTORY OF PROSTATE CANCER: ICD-10-CM

## 2023-01-30 DIAGNOSIS — C73 PAPILLARY THYROID CARCINOMA: ICD-10-CM

## 2023-01-30 DIAGNOSIS — I10 ESSENTIAL HYPERTENSION: ICD-10-CM

## 2023-01-30 LAB
ALBUMIN SERPL BCP-MCNC: 3.6 G/DL (ref 3.5–5.2)
ALP SERPL-CCNC: 47 U/L (ref 55–135)
ALT SERPL W/O P-5'-P-CCNC: 19 U/L (ref 10–44)
ANION GAP SERPL CALC-SCNC: 12 MMOL/L (ref 8–16)
AST SERPL-CCNC: 16 U/L (ref 10–40)
BASOPHILS # BLD AUTO: 0.02 K/UL (ref 0–0.2)
BASOPHILS NFR BLD: 0.4 % (ref 0–1.9)
BILIRUB SERPL-MCNC: 0.6 MG/DL (ref 0.1–1)
BUN SERPL-MCNC: 15 MG/DL (ref 8–23)
CALCIUM SERPL-MCNC: 9.3 MG/DL (ref 8.7–10.5)
CHLORIDE SERPL-SCNC: 105 MMOL/L (ref 95–110)
CHOLEST SERPL-MCNC: 140 MG/DL (ref 120–199)
CHOLEST/HDLC SERPL: 4.1 {RATIO} (ref 2–5)
CO2 SERPL-SCNC: 21 MMOL/L (ref 23–29)
COMPLEXED PSA SERPL-MCNC: 0.82 NG/ML (ref 0–4)
CREAT SERPL-MCNC: 1.2 MG/DL (ref 0.5–1.4)
DIFFERENTIAL METHOD: ABNORMAL
EOSINOPHIL # BLD AUTO: 0.2 K/UL (ref 0–0.5)
EOSINOPHIL NFR BLD: 3.3 % (ref 0–8)
ERYTHROCYTE [DISTWIDTH] IN BLOOD BY AUTOMATED COUNT: 14.7 % (ref 11.5–14.5)
EST. GFR  (NO RACE VARIABLE): >60 ML/MIN/1.73 M^2
GLUCOSE SERPL-MCNC: 101 MG/DL (ref 70–110)
HCT VFR BLD AUTO: 39.2 % (ref 40–54)
HDLC SERPL-MCNC: 34 MG/DL (ref 40–75)
HDLC SERPL: 24.3 % (ref 20–50)
HGB BLD-MCNC: 11.9 G/DL (ref 14–18)
IMM GRANULOCYTES # BLD AUTO: 0.01 K/UL (ref 0–0.04)
IMM GRANULOCYTES NFR BLD AUTO: 0.2 % (ref 0–0.5)
LDLC SERPL CALC-MCNC: 95.2 MG/DL (ref 63–159)
LYMPHOCYTES # BLD AUTO: 2.8 K/UL (ref 1–4.8)
LYMPHOCYTES NFR BLD: 51.7 % (ref 18–48)
MCH RBC QN AUTO: 23.7 PG (ref 27–31)
MCHC RBC AUTO-ENTMCNC: 30.4 G/DL (ref 32–36)
MCV RBC AUTO: 78 FL (ref 82–98)
MONOCYTES # BLD AUTO: 0.5 K/UL (ref 0.3–1)
MONOCYTES NFR BLD: 9.3 % (ref 4–15)
NEUTROPHILS # BLD AUTO: 1.9 K/UL (ref 1.8–7.7)
NEUTROPHILS NFR BLD: 35.1 % (ref 38–73)
NONHDLC SERPL-MCNC: 106 MG/DL
NRBC BLD-RTO: 0 /100 WBC
PLATELET # BLD AUTO: 307 K/UL (ref 150–450)
PMV BLD AUTO: 10 FL (ref 9.2–12.9)
POTASSIUM SERPL-SCNC: 4.1 MMOL/L (ref 3.5–5.1)
PROT SERPL-MCNC: 7 G/DL (ref 6–8.4)
RBC # BLD AUTO: 5.02 M/UL (ref 4.6–6.2)
SODIUM SERPL-SCNC: 138 MMOL/L (ref 136–145)
T4 FREE SERPL-MCNC: 1.32 NG/DL (ref 0.71–1.51)
TRIGL SERPL-MCNC: 54 MG/DL (ref 30–150)
TSH SERPL DL<=0.005 MIU/L-ACNC: 0.34 UIU/ML (ref 0.4–4)
WBC # BLD AUTO: 5.4 K/UL (ref 3.9–12.7)

## 2023-01-30 PROCEDURE — 84443 ASSAY THYROID STIM HORMONE: CPT | Performed by: INTERNAL MEDICINE

## 2023-01-30 PROCEDURE — 84432 ASSAY OF THYROGLOBULIN: CPT | Performed by: INTERNAL MEDICINE

## 2023-01-30 PROCEDURE — 80061 LIPID PANEL: CPT | Performed by: INTERNAL MEDICINE

## 2023-01-30 PROCEDURE — 85025 COMPLETE CBC W/AUTO DIFF WBC: CPT | Performed by: INTERNAL MEDICINE

## 2023-01-30 PROCEDURE — 84153 ASSAY OF PSA TOTAL: CPT | Performed by: INTERNAL MEDICINE

## 2023-01-30 PROCEDURE — 36415 COLL VENOUS BLD VENIPUNCTURE: CPT | Mod: PN | Performed by: INTERNAL MEDICINE

## 2023-01-30 PROCEDURE — 80053 COMPREHEN METABOLIC PANEL: CPT | Performed by: INTERNAL MEDICINE

## 2023-01-30 PROCEDURE — 84439 ASSAY OF FREE THYROXINE: CPT | Performed by: INTERNAL MEDICINE

## 2023-01-31 ENCOUNTER — PATIENT MESSAGE (OUTPATIENT)
Dept: ENDOCRINOLOGY | Facility: CLINIC | Age: 68
End: 2023-01-31
Payer: MEDICARE

## 2023-01-31 DIAGNOSIS — E89.0 POSTOPERATIVE HYPOTHYROIDISM: Primary | ICD-10-CM

## 2023-02-09 NOTE — PROGRESS NOTES
Subjective:       Patient ID: Dani Gastelum Jr. is a 67 y.o. male.    Chief Complaint: Follow-up (6 mo f/u with labs)      Patient is a 67 y.o. male who traditionally follows with Frandy Hill MD presenting today for follow up.     Review of patient's allergies indicates:  No Known Allergies    Medication List with Changes/Refills   Current Medications    ASPIRIN 81 MG CHEW    Take 81 mg by mouth once daily.    CO-ENZYME Q-10 30 MG CAPSULE    Take 30 mg by mouth once daily.    DORZOLAMIDE-TIMOLOL 2-0.5% (COSOPT) 22.3-6.8 MG/ML OPHTHALMIC SOLUTION    INSTILL 1 DROP IN BOTH EYES TWICE DAILY AS DIRECTED    FISH OIL-OMEGA-3 FATTY ACIDS 300-1,000 MG CAPSULE    Take 2 g by mouth once daily.    FLUTICASONE (FLONASE) 50 MCG/ACTUATION NASAL SPRAY    SPRAY TWICE IN NOSTRIL(S) DAILY AS NEEDED FOR ALLERGIC RHINITIS    FOLIC ACID/MV,FE,OTHER MIN (CENTRUM ORAL)    Take by mouth once daily.     GLUCOSAMINE-CHONDROITIN 500-400 MG TABLET    Take 1 tablet by mouth 3 (three) times daily. Pt taking one pill once a day.    LATANOPROST 0.005 % OPHTHALMIC SOLUTION    INT 1 GTT IN OU QD HS    LEVOTHYROXINE (SYNTHROID, LEVOTHROID) 175 MCG TABLET    Take 1 tablet (175 mcg total) by mouth before breakfast.    LOSARTAN (COZAAR) 100 MG TABLET    TAKE 1 TABLET(100 MG) BY MOUTH EVERY DAY    ONDANSETRON (ZOFRAN-ODT) 4 MG TBDL    Take 4 mg by mouth once.    PRAVASTATIN (PRAVACHOL) 40 MG TABLET    TAKE 1 TABLET BY MOUTH EVERY DAY    VOLTAREN 1 % GEL       Discontinued Medications    AMLODIPINE (NORVASC) 5 MG TABLET    Take 1 tablet (5 mg total) by mouth once daily. For blood pressure.     Medical, social and surgical history has been reviewed with the patient.      Review of Systems   Constitutional:  Negative for chills and fever.   Respiratory:  Negative for cough and shortness of breath.    Cardiovascular:  Negative for chest pain.   Neurological:  Negative for dizziness and headaches.         Objective:   /86 (BP Location: Right arm,  "Patient Position: Sitting, BP Method: Large (Manual))   Pulse 68   Temp 98.4 °F (36.9 °C) (Temporal)   Resp 16   Ht 5' 11" (1.803 m)   Wt 101.6 kg (223 lb 15.8 oz)   SpO2 98%   BMI 31.24 kg/m²       Physical Exam  Vitals reviewed.   Constitutional:       Appearance: Normal appearance.   HENT:      Head: Normocephalic and atraumatic.   Cardiovascular:      Rate and Rhythm: Normal rate and regular rhythm.      Heart sounds: Normal heart sounds. No murmur heard.  Pulmonary:      Effort: Pulmonary effort is normal.      Breath sounds: Normal breath sounds. No wheezing.   Skin:     General: Skin is warm and dry.   Neurological:      Mental Status: He is alert and oriented to person, place, and time.       Last Labs:  Glucose   Date Value Ref Range Status   01/30/2023 101 70 - 110 mg/dL Final   07/21/2022 92 70 - 110 mg/dL Final     BUN   Date Value Ref Range Status   01/30/2023 15 8 - 23 mg/dL Final   07/21/2022 17 8 - 23 mg/dL Final     Creatinine   Date Value Ref Range Status   01/30/2023 1.2 0.5 - 1.4 mg/dL Final   07/21/2022 1.3 0.5 - 1.4 mg/dL Final     Cholesterol   Date Value Ref Range Status   01/30/2023 140 120 - 199 mg/dL Final     Comment:     The National Cholesterol Education Program (NCEP) has set the  following guidelines (reference ranges) for Cholesterol:  Optimal.....................<200 mg/dL  Borderline High.............200-239 mg/dL  High........................> or = 240 mg/dL     07/21/2022 138 120 - 199 mg/dL Final     Comment:     The National Cholesterol Education Program (NCEP) has set the  following guidelines (reference ranges) for Cholesterol:  Optimal.....................<200 mg/dL  Borderline High.............200-239 mg/dL  High........................> or = 240 mg/dL       Hemoglobin A1C   Date Value Ref Range Status   02/10/2023 5.4 4.0 - 5.6 % Final     Comment:     ADA Screening Guidelines:  5.7-6.4%  Consistent with prediabetes  >or=6.5%  Consistent with diabetes    High levels " of fetal hemoglobin interfere with the HbA1C  assay. Heterozygous hemoglobin variants (HbS, HgC, etc)do  not significantly interfere with this assay.   However, presence of multiple variants may affect accuracy.       Hemoglobin   Date Value Ref Range Status   01/30/2023 11.9 (L) 14.0 - 18.0 g/dL Final   07/21/2022 11.6 (L) 14.0 - 18.0 g/dL Final     Hematocrit   Date Value Ref Range Status   01/30/2023 39.2 (L) 40.0 - 54.0 % Final   07/21/2022 37.7 (L) 40.0 - 54.0 % Final       I have reviewed the following:     Details / Date    [x]   Labs     []   Micro     []   Pathology     []   Imaging     []   Cardiology Procedures     []   Other        Assessment and Plan:     1. Beta thalassemia    Chronic, recently H&H has been declining.     - Iron and TIBC; Future  - Ambulatory referral/consult to Hematology / Oncology; Future    2. Essential hypertension  3. Coronary artery calcification seen on CAT scan    Chronic, stable, continue current medication    - CT Cardiac Scoring; Future    4. Postoperative hypothyroidism    Chronic, stable, continue current medication     5. Other hyperlipidemia    Chronic, stable, continue current medication    - CT Cardiac Scoring; Future    6. History of prostate cancer    Chronic, stable     7. Agatston CAC score 200-399  8. Encounter for screening for cardiovascular disorders    - CT Cardiac Scoring; Future    9. Encounter for long-term (current) use of medications    Losartan, Synthroid, Pravachol     - Hemoglobin A1C; Future  - Vitamin B12; Future  - Vitamin D; Future

## 2023-02-10 ENCOUNTER — LAB VISIT (OUTPATIENT)
Dept: LAB | Facility: HOSPITAL | Age: 68
End: 2023-02-10
Payer: MEDICARE

## 2023-02-10 ENCOUNTER — OFFICE VISIT (OUTPATIENT)
Dept: INTERNAL MEDICINE | Facility: CLINIC | Age: 68
End: 2023-02-10
Payer: MEDICARE

## 2023-02-10 VITALS
RESPIRATION RATE: 16 BRPM | TEMPERATURE: 98 F | DIASTOLIC BLOOD PRESSURE: 86 MMHG | OXYGEN SATURATION: 98 % | WEIGHT: 224 LBS | BODY MASS INDEX: 31.36 KG/M2 | HEART RATE: 68 BPM | HEIGHT: 71 IN | SYSTOLIC BLOOD PRESSURE: 118 MMHG

## 2023-02-10 DIAGNOSIS — Z85.46 HISTORY OF PROSTATE CANCER: ICD-10-CM

## 2023-02-10 DIAGNOSIS — Z13.6 ENCOUNTER FOR SCREENING FOR CARDIOVASCULAR DISORDERS: ICD-10-CM

## 2023-02-10 DIAGNOSIS — R93.1 AGATSTON CAC SCORE 200-399: ICD-10-CM

## 2023-02-10 DIAGNOSIS — D56.1 BETA THALASSEMIA: Primary | ICD-10-CM

## 2023-02-10 DIAGNOSIS — Z79.899 ENCOUNTER FOR LONG-TERM (CURRENT) USE OF MEDICATIONS: ICD-10-CM

## 2023-02-10 DIAGNOSIS — I10 ESSENTIAL HYPERTENSION: Chronic | ICD-10-CM

## 2023-02-10 DIAGNOSIS — E89.0 POSTOPERATIVE HYPOTHYROIDISM: ICD-10-CM

## 2023-02-10 DIAGNOSIS — D56.3 BETA THALASSEMIA MINOR: ICD-10-CM

## 2023-02-10 DIAGNOSIS — I25.10 CORONARY ARTERY CALCIFICATION SEEN ON CAT SCAN: Chronic | ICD-10-CM

## 2023-02-10 DIAGNOSIS — E78.49 OTHER HYPERLIPIDEMIA: ICD-10-CM

## 2023-02-10 PROBLEM — H40.9 GLAUCOMA: Status: ACTIVE | Noted: 2022-05-02

## 2023-02-10 LAB
25(OH)D3+25(OH)D2 SERPL-MCNC: 55 NG/ML (ref 30–96)
ESTIMATED AVG GLUCOSE: 108 MG/DL (ref 68–131)
HBA1C MFR BLD: 5.4 % (ref 4–5.6)
IRON SERPL-MCNC: 89 UG/DL (ref 45–160)
SATURATED IRON: 27 % (ref 20–50)
TOTAL IRON BINDING CAPACITY: 326 UG/DL (ref 250–450)
TRANSFERRIN SERPL-MCNC: 220 MG/DL (ref 200–375)
VIT B12 SERPL-MCNC: >2000 PG/ML (ref 210–950)

## 2023-02-10 PROCEDURE — 4010F PR ACE/ARB THEARPY RXD/TAKEN: ICD-10-PCS | Mod: CPTII,S$GLB,, | Performed by: NURSE PRACTITIONER

## 2023-02-10 PROCEDURE — 82607 VITAMIN B-12: CPT | Performed by: NURSE PRACTITIONER

## 2023-02-10 PROCEDURE — 1160F RVW MEDS BY RX/DR IN RCRD: CPT | Mod: CPTII,S$GLB,, | Performed by: NURSE PRACTITIONER

## 2023-02-10 PROCEDURE — 99214 PR OFFICE/OUTPT VISIT, EST, LEVL IV, 30-39 MIN: ICD-10-PCS | Mod: S$GLB,,, | Performed by: NURSE PRACTITIONER

## 2023-02-10 PROCEDURE — 4010F ACE/ARB THERAPY RXD/TAKEN: CPT | Mod: CPTII,S$GLB,, | Performed by: NURSE PRACTITIONER

## 2023-02-10 PROCEDURE — 1157F ADVNC CARE PLAN IN RCRD: CPT | Mod: CPTII,S$GLB,, | Performed by: NURSE PRACTITIONER

## 2023-02-10 PROCEDURE — 3008F BODY MASS INDEX DOCD: CPT | Mod: CPTII,S$GLB,, | Performed by: NURSE PRACTITIONER

## 2023-02-10 PROCEDURE — 1126F PR PAIN SEVERITY QUANTIFIED, NO PAIN PRESENT: ICD-10-PCS | Mod: CPTII,S$GLB,, | Performed by: NURSE PRACTITIONER

## 2023-02-10 PROCEDURE — 1159F PR MEDICATION LIST DOCUMENTED IN MEDICAL RECORD: ICD-10-PCS | Mod: CPTII,S$GLB,, | Performed by: NURSE PRACTITIONER

## 2023-02-10 PROCEDURE — 3044F HG A1C LEVEL LT 7.0%: CPT | Mod: CPTII,S$GLB,, | Performed by: NURSE PRACTITIONER

## 2023-02-10 PROCEDURE — 99999 PR PBB SHADOW E&M-EST. PATIENT-LVL V: CPT | Mod: PBBFAC,,, | Performed by: NURSE PRACTITIONER

## 2023-02-10 PROCEDURE — 1160F PR REVIEW ALL MEDS BY PRESCRIBER/CLIN PHARMACIST DOCUMENTED: ICD-10-PCS | Mod: CPTII,S$GLB,, | Performed by: NURSE PRACTITIONER

## 2023-02-10 PROCEDURE — 82306 VITAMIN D 25 HYDROXY: CPT | Performed by: NURSE PRACTITIONER

## 2023-02-10 PROCEDURE — 1159F MED LIST DOCD IN RCRD: CPT | Mod: CPTII,S$GLB,, | Performed by: NURSE PRACTITIONER

## 2023-02-10 PROCEDURE — 3008F PR BODY MASS INDEX (BMI) DOCUMENTED: ICD-10-PCS | Mod: CPTII,S$GLB,, | Performed by: NURSE PRACTITIONER

## 2023-02-10 PROCEDURE — 1157F PR ADVANCE CARE PLAN OR EQUIV PRESENT IN MEDICAL RECORD: ICD-10-PCS | Mod: CPTII,S$GLB,, | Performed by: NURSE PRACTITIONER

## 2023-02-10 PROCEDURE — 83036 HEMOGLOBIN GLYCOSYLATED A1C: CPT | Performed by: NURSE PRACTITIONER

## 2023-02-10 PROCEDURE — 84466 ASSAY OF TRANSFERRIN: CPT | Performed by: NURSE PRACTITIONER

## 2023-02-10 PROCEDURE — 99214 OFFICE O/P EST MOD 30 MIN: CPT | Mod: S$GLB,,, | Performed by: NURSE PRACTITIONER

## 2023-02-10 PROCEDURE — 3079F DIAST BP 80-89 MM HG: CPT | Mod: CPTII,S$GLB,, | Performed by: NURSE PRACTITIONER

## 2023-02-10 PROCEDURE — 36415 COLL VENOUS BLD VENIPUNCTURE: CPT | Mod: PO | Performed by: NURSE PRACTITIONER

## 2023-02-10 PROCEDURE — 99999 PR PBB SHADOW E&M-EST. PATIENT-LVL V: ICD-10-PCS | Mod: PBBFAC,,, | Performed by: NURSE PRACTITIONER

## 2023-02-10 PROCEDURE — 3074F PR MOST RECENT SYSTOLIC BLOOD PRESSURE < 130 MM HG: ICD-10-PCS | Mod: CPTII,S$GLB,, | Performed by: NURSE PRACTITIONER

## 2023-02-10 PROCEDURE — 3288F PR FALLS RISK ASSESSMENT DOCUMENTED: ICD-10-PCS | Mod: CPTII,S$GLB,, | Performed by: NURSE PRACTITIONER

## 2023-02-10 PROCEDURE — 99499 RISK ADDL DX/OHS AUDIT: ICD-10-PCS | Mod: S$GLB,,, | Performed by: NURSE PRACTITIONER

## 2023-02-10 PROCEDURE — 3044F PR MOST RECENT HEMOGLOBIN A1C LEVEL <7.0%: ICD-10-PCS | Mod: CPTII,S$GLB,, | Performed by: NURSE PRACTITIONER

## 2023-02-10 PROCEDURE — 1101F PT FALLS ASSESS-DOCD LE1/YR: CPT | Mod: CPTII,S$GLB,, | Performed by: NURSE PRACTITIONER

## 2023-02-10 PROCEDURE — 1126F AMNT PAIN NOTED NONE PRSNT: CPT | Mod: CPTII,S$GLB,, | Performed by: NURSE PRACTITIONER

## 2023-02-10 PROCEDURE — 1101F PR PT FALLS ASSESS DOC 0-1 FALLS W/OUT INJ PAST YR: ICD-10-PCS | Mod: CPTII,S$GLB,, | Performed by: NURSE PRACTITIONER

## 2023-02-10 PROCEDURE — 3288F FALL RISK ASSESSMENT DOCD: CPT | Mod: CPTII,S$GLB,, | Performed by: NURSE PRACTITIONER

## 2023-02-10 PROCEDURE — 3074F SYST BP LT 130 MM HG: CPT | Mod: CPTII,S$GLB,, | Performed by: NURSE PRACTITIONER

## 2023-02-10 PROCEDURE — 3079F PR MOST RECENT DIASTOLIC BLOOD PRESSURE 80-89 MM HG: ICD-10-PCS | Mod: CPTII,S$GLB,, | Performed by: NURSE PRACTITIONER

## 2023-02-10 PROCEDURE — 99499 UNLISTED E&M SERVICE: CPT | Mod: S$GLB,,, | Performed by: NURSE PRACTITIONER

## 2023-03-03 ENCOUNTER — TELEPHONE (OUTPATIENT)
Dept: HEMATOLOGY/ONCOLOGY | Facility: CLINIC | Age: 68
End: 2023-03-03
Payer: MEDICARE

## 2023-03-03 NOTE — TELEPHONE ENCOUNTER
----- Message from Tripp Beckwith sent at 3/3/2023 11:53 AM CST -----  Type:  Needs Medical Advice    Who Called: self  Reason:returning call  Would the patient rather a call back or a response via [facility]? call  Best Call Back Number: [phone]  Additional Information: none

## 2023-03-09 ENCOUNTER — OFFICE VISIT (OUTPATIENT)
Dept: HEMATOLOGY/ONCOLOGY | Facility: CLINIC | Age: 68
End: 2023-03-09
Payer: MEDICARE

## 2023-03-09 VITALS — HEIGHT: 71 IN | BODY MASS INDEX: 31.39 KG/M2 | WEIGHT: 224.19 LBS

## 2023-03-09 DIAGNOSIS — I25.10 CORONARY ARTERY CALCIFICATION SEEN ON CAT SCAN: Chronic | ICD-10-CM

## 2023-03-09 DIAGNOSIS — D56.3 BETA THALASSEMIA MINOR: Primary | ICD-10-CM

## 2023-03-09 PROCEDURE — 3288F PR FALLS RISK ASSESSMENT DOCUMENTED: ICD-10-PCS | Mod: CPTII,S$GLB,, | Performed by: INTERNAL MEDICINE

## 2023-03-09 PROCEDURE — 3008F BODY MASS INDEX DOCD: CPT | Mod: CPTII,S$GLB,, | Performed by: INTERNAL MEDICINE

## 2023-03-09 PROCEDURE — 99499 RISK ADDL DX/OHS AUDIT: ICD-10-PCS | Mod: S$GLB,,, | Performed by: INTERNAL MEDICINE

## 2023-03-09 PROCEDURE — 3008F PR BODY MASS INDEX (BMI) DOCUMENTED: ICD-10-PCS | Mod: CPTII,S$GLB,, | Performed by: INTERNAL MEDICINE

## 2023-03-09 PROCEDURE — 4010F ACE/ARB THERAPY RXD/TAKEN: CPT | Mod: CPTII,S$GLB,, | Performed by: INTERNAL MEDICINE

## 2023-03-09 PROCEDURE — 99205 PR OFFICE/OUTPT VISIT, NEW, LEVL V, 60-74 MIN: ICD-10-PCS | Mod: S$GLB,,, | Performed by: INTERNAL MEDICINE

## 2023-03-09 PROCEDURE — 99999 PR PBB SHADOW E&M-EST. PATIENT-LVL III: ICD-10-PCS | Mod: PBBFAC,,, | Performed by: INTERNAL MEDICINE

## 2023-03-09 PROCEDURE — 99205 OFFICE O/P NEW HI 60 MIN: CPT | Mod: S$GLB,,, | Performed by: INTERNAL MEDICINE

## 2023-03-09 PROCEDURE — 1157F ADVNC CARE PLAN IN RCRD: CPT | Mod: CPTII,S$GLB,, | Performed by: INTERNAL MEDICINE

## 2023-03-09 PROCEDURE — 1101F PT FALLS ASSESS-DOCD LE1/YR: CPT | Mod: CPTII,S$GLB,, | Performed by: INTERNAL MEDICINE

## 2023-03-09 PROCEDURE — 4010F PR ACE/ARB THEARPY RXD/TAKEN: ICD-10-PCS | Mod: CPTII,S$GLB,, | Performed by: INTERNAL MEDICINE

## 2023-03-09 PROCEDURE — 1159F MED LIST DOCD IN RCRD: CPT | Mod: CPTII,S$GLB,, | Performed by: INTERNAL MEDICINE

## 2023-03-09 PROCEDURE — 3044F PR MOST RECENT HEMOGLOBIN A1C LEVEL <7.0%: ICD-10-PCS | Mod: CPTII,S$GLB,, | Performed by: INTERNAL MEDICINE

## 2023-03-09 PROCEDURE — 1157F PR ADVANCE CARE PLAN OR EQUIV PRESENT IN MEDICAL RECORD: ICD-10-PCS | Mod: CPTII,S$GLB,, | Performed by: INTERNAL MEDICINE

## 2023-03-09 PROCEDURE — 1101F PR PT FALLS ASSESS DOC 0-1 FALLS W/OUT INJ PAST YR: ICD-10-PCS | Mod: CPTII,S$GLB,, | Performed by: INTERNAL MEDICINE

## 2023-03-09 PROCEDURE — 3044F HG A1C LEVEL LT 7.0%: CPT | Mod: CPTII,S$GLB,, | Performed by: INTERNAL MEDICINE

## 2023-03-09 PROCEDURE — 99999 PR PBB SHADOW E&M-EST. PATIENT-LVL III: CPT | Mod: PBBFAC,,, | Performed by: INTERNAL MEDICINE

## 2023-03-09 PROCEDURE — 3288F FALL RISK ASSESSMENT DOCD: CPT | Mod: CPTII,S$GLB,, | Performed by: INTERNAL MEDICINE

## 2023-03-09 PROCEDURE — 99499 UNLISTED E&M SERVICE: CPT | Mod: S$GLB,,, | Performed by: INTERNAL MEDICINE

## 2023-03-09 PROCEDURE — 1159F PR MEDICATION LIST DOCUMENTED IN MEDICAL RECORD: ICD-10-PCS | Mod: CPTII,S$GLB,, | Performed by: INTERNAL MEDICINE

## 2023-03-09 NOTE — PROGRESS NOTES
PATIENT: Dani Gastelum Jr.  MRN: [id number]  DATE: 3/9/2023    Diagnosis:   1. Beta thalassemia    2. Coronary artery calcification seen on CAT scan        Chief Complaint: Anemia      Subjective:    History of Present Illness: Mr. Gastelum is a 67 y.o. male who presents for evaluation and management of beta thalassemia. He had hemoglobin electrophoresis in 2008 confirming this diagnosis. His hemoglobin has historically run between 12-13 however in the past year it has been 11.6-->11.9 most recently. MCV has remained relatively stable around 75-78 for past couple years. Iron studies have been consistently normal. He had B12 checked recently which was elevated; he does not that he has taken multivitamin containing B12. He reports that he exercises regularly; he denies SOB and CP. He denies bleeding. He is uptodate on age-appropriate cancer screening. He does note that he has 2 brothers with multiple myeloma and he had labs to screen for that a few years ago and those were negative. He had a CT coronary calcium score yesterday which was elevated; he is very distraught by this news. I suggested it would be appropriate to consult with a cardiologist and he accepts referral.        Past medical, surgical, family, and social histories have been reviewed and updated below.    Past Medical History:   Past Medical History:   Diagnosis Date    ALLERGIC RHINITIS     Allergy     BPH (benign prostatic hypertrophy)     Hyperlipidemia     Hypertension     Thalassemia, beta     Thyroid cancer     Vertigo, intermittent        Past Surgical History:   Past Surgical History:   Procedure Laterality Date    DISSECTION OF NECK Left 10/16/2020    Procedure: DISSECTION, NECK Central;  Surgeon: Una Lobato MD;  Location: [facility];  Service: General;  Laterality: Left;    KNEE SURGERY Right     NASAL SEPTUM SURGERY      SHOULDER SURGERY Right     right rotator cuff    THYROIDECTOMY N/A 10/16/2020    Procedure: THYROIDECTOMY- total;   Surgeon: Una Lobato MD;  Location: [facility];  Service: General;  Laterality: N/A;    TRANSURETHRAL RESECTION OF PROSTATE      VASECTOMY         Family History:   Family History   Problem Relation Age of Onset    Hyperlipidemia Mother     Gout Mother     Hypertension Mother     Hyperlipidemia Father     Hypertension Father     Kidney disease Father     Cancer Father         Lung and Renal Cancer    Cancer Brother         myeloma    Cancer Brother         prostate    Heart failure Maternal Grandmother     Heart failure Paternal Grandfather     Heart attack Neg Hx     Heart disease Neg Hx     Stroke Neg Hx        Social History:  reports that he has never smoked. He has never used smokeless tobacco. He reports that he does not drink alcohol and does not use drugs.    Allergies:  Review of patient's allergies indicates:  No Known Allergies    Medications:  Current Outpatient Medications   Medication Sig Dispense Refill    aspirin 81 MG Chew Take 81 mg by mouth once daily.      co-enzyme Q-10 30 mg capsule Take 30 mg by mouth once daily.      dorzolamide-timolol 2-0.5% (COSOPT) 22.3-6.8 mg/mL ophthalmic solution INSTILL 1 DROP IN BOTH EYES TWICE DAILY AS DIRECTED      fish oil-omega-3 fatty acids 300-1,000 mg capsule Take 2 g by mouth once daily.      fluticasone (FLONASE) 50 mcg/actuation nasal spray SPRAY TWICE IN NOSTRIL(S) DAILY AS NEEDED FOR ALLERGIC RHINITIS 1 Bottle 6    FOLIC ACID/MV,FE,OTHER MIN (CENTRUM ORAL) Take by mouth once daily.       glucosamine-chondroitin 500-400 mg tablet Take 1 tablet by mouth 3 (three) times daily. Pt taking one pill once a day.      latanoprost 0.005 % ophthalmic solution INT 1 GTT IN OU QD HS  3    levothyroxine (SYNTHROID, LEVOTHROID) 175 MCG tablet Take 1 tablet (175 mcg total) by mouth before breakfast. 90 tablet 3    losartan (COZAAR) 100 MG tablet TAKE 1 TABLET(100 MG) BY MOUTH EVERY DAY 30 tablet 11    ondansetron (ZOFRAN-ODT) 4 MG TbDL Take 4 mg by mouth once.       "pravastatin (PRAVACHOL) 40 MG tablet TAKE 1 TABLET BY MOUTH EVERY DAY 90 tablet 2    VOLTAREN 1 % Gel        No current facility-administered medications for this visit.       ECOG Performance Status:   ECOG SCORE    0 - Fully active-able to carry on all pre-disease performance without restriction         Objective:      Vitals:   Vitals:    03/09/23 0934   Weight: 101.7 kg (224 lb 3.3 oz)   Height: 5' 11" (1.803 m)     BMI: Body mass index is 31.27 kg/m².    Physical Exam  Vitals and nursing note reviewed.   Constitutional:       General: He is not in acute distress.     Appearance: Normal appearance. He is normal weight. He is not toxic-appearing.   HENT:      Head: Normocephalic and atraumatic.   Eyes:      General: No scleral icterus.     Conjunctiva/sclera: Conjunctivae normal.   Cardiovascular:      Rate and Rhythm: Normal rate and regular rhythm.      Heart sounds: Normal heart sounds.   Pulmonary:      Effort: Pulmonary effort is normal.      Breath sounds: Normal breath sounds. No wheezing, rhonchi or rales.   Abdominal:      General: Abdomen is flat. Bowel sounds are normal.      Palpations: Abdomen is soft.      Tenderness: There is no abdominal tenderness.   Musculoskeletal:         General: No tenderness or deformity.      Cervical back: Neck supple.   Lymphadenopathy:      Cervical: No cervical adenopathy.   Skin:     General: Skin is warm and dry.      Coloration: Skin is not jaundiced.      Findings: No bruising or erythema.   Neurological:      General: No focal deficit present.      Mental Status: He is alert and oriented to person, place, and time. Mental status is at baseline.   Psychiatric:         Mood and Affect: Mood normal.         Behavior: Behavior normal.         Thought Content: Thought content normal.       Laboratory Data:  Hemoglobin Bands  Hemoglobin Electrophoresis,Hgb A2 [place].  Collected: 07/24/08 1527   Result status: Final   Resulting lab: [facility]   Value: text   Comment: " Hemoglobin Bands:   HB A AND INCREASED HB A2;   REQUIRES CBC TO EVALUATE FOR MICROCYTOSIS FOR INTERPRETATION;   IF MCV IS DECREASED,   PATTERN IS CONSISTENT WITH BETA THALASSEMIA MINOR.   Interpreted by: Lily NIEVES              Assessment:       1. Beta thalassemia minor    2. Coronary artery calcification seen on CAT scan      We discussed diagnosis of beta thalassemia minor. Iron studies suggest normal iron status and B12 is elevated. Low suspicion for other contributing factors. I offered to check additional iron studies, hemolysis labs, and folate (and myeloma studies) if he would like however I do suspect these would not meaningfully alter need for any treatments.  He is very anxious about the results of his recent coronary calcium CT would like to consult with a cardiologist.     Plan:     Beta thalassemia at baseline, asymptomatic. No further workup necessary at this time. Patient advised to return if anemia is worsening or if he has other new concerns, but otherwise beta thalassemia minor requires no specific follow-up.    Referral to cardiologist.       Marcello Mcnally MD, FACP  Hematology/Oncology  [facility] - [address], [address]  [place] [address]  Phone: [phone]  Fax: [phone]    Total time of this visit, including time spent face to face with patient and/or via video/audio, and also in preparing for today's visit for MDM and documentation. (Medical Decision Making, including consideration of possible diagnoses, management options, complex medical record review, review of diagnostic tests and information, consideration and discussion of significant complications based on comorbidities, and discussion with providers involved with the care of the patient) 61 minutes. Greater than 50% was spent face to face with the patient counseling and coordinating care.

## 2023-03-30 ENCOUNTER — PES CALL (OUTPATIENT)
Dept: ADMINISTRATIVE | Facility: CLINIC | Age: 68
End: 2023-03-30
Payer: MEDICARE

## 2023-04-05 ENCOUNTER — OFFICE VISIT (OUTPATIENT)
Dept: CARDIOLOGY | Facility: CLINIC | Age: 68
End: 2023-04-05
Payer: MEDICARE

## 2023-04-05 VITALS
DIASTOLIC BLOOD PRESSURE: 60 MMHG | HEIGHT: 71 IN | WEIGHT: 219.44 LBS | BODY MASS INDEX: 30.72 KG/M2 | HEART RATE: 67 BPM | SYSTOLIC BLOOD PRESSURE: 152 MMHG

## 2023-04-05 DIAGNOSIS — E78.49 OTHER HYPERLIPIDEMIA: ICD-10-CM

## 2023-04-05 DIAGNOSIS — I25.10 CORONARY ARTERY CALCIFICATION SEEN ON CAT SCAN: Chronic | ICD-10-CM

## 2023-04-05 DIAGNOSIS — R07.9 CHEST PAIN, UNSPECIFIED TYPE: Primary | ICD-10-CM

## 2023-04-05 DIAGNOSIS — I10 ESSENTIAL HYPERTENSION: Chronic | ICD-10-CM

## 2023-04-05 PROCEDURE — 1157F PR ADVANCE CARE PLAN OR EQUIV PRESENT IN MEDICAL RECORD: ICD-10-PCS | Mod: CPTII,S$GLB,, | Performed by: INTERNAL MEDICINE

## 2023-04-05 PROCEDURE — 99204 PR OFFICE/OUTPT VISIT, NEW, LEVL IV, 45-59 MIN: ICD-10-PCS | Mod: S$GLB,,, | Performed by: INTERNAL MEDICINE

## 2023-04-05 PROCEDURE — 3077F SYST BP >= 140 MM HG: CPT | Mod: CPTII,S$GLB,, | Performed by: INTERNAL MEDICINE

## 2023-04-05 PROCEDURE — 1157F ADVNC CARE PLAN IN RCRD: CPT | Mod: CPTII,S$GLB,, | Performed by: INTERNAL MEDICINE

## 2023-04-05 PROCEDURE — 99999 PR PBB SHADOW E&M-EST. PATIENT-LVL IV: CPT | Mod: PBBFAC,,, | Performed by: INTERNAL MEDICINE

## 2023-04-05 PROCEDURE — 3288F PR FALLS RISK ASSESSMENT DOCUMENTED: ICD-10-PCS | Mod: CPTII,S$GLB,, | Performed by: INTERNAL MEDICINE

## 2023-04-05 PROCEDURE — 1126F AMNT PAIN NOTED NONE PRSNT: CPT | Mod: CPTII,S$GLB,, | Performed by: INTERNAL MEDICINE

## 2023-04-05 PROCEDURE — 3078F DIAST BP <80 MM HG: CPT | Mod: CPTII,S$GLB,, | Performed by: INTERNAL MEDICINE

## 2023-04-05 PROCEDURE — 1160F PR REVIEW ALL MEDS BY PRESCRIBER/CLIN PHARMACIST DOCUMENTED: ICD-10-PCS | Mod: CPTII,S$GLB,, | Performed by: INTERNAL MEDICINE

## 2023-04-05 PROCEDURE — 3078F PR MOST RECENT DIASTOLIC BLOOD PRESSURE < 80 MM HG: ICD-10-PCS | Mod: CPTII,S$GLB,, | Performed by: INTERNAL MEDICINE

## 2023-04-05 PROCEDURE — 3008F PR BODY MASS INDEX (BMI) DOCUMENTED: ICD-10-PCS | Mod: CPTII,S$GLB,, | Performed by: INTERNAL MEDICINE

## 2023-04-05 PROCEDURE — 3044F PR MOST RECENT HEMOGLOBIN A1C LEVEL <7.0%: ICD-10-PCS | Mod: CPTII,S$GLB,, | Performed by: INTERNAL MEDICINE

## 2023-04-05 PROCEDURE — 3077F PR MOST RECENT SYSTOLIC BLOOD PRESSURE >= 140 MM HG: ICD-10-PCS | Mod: CPTII,S$GLB,, | Performed by: INTERNAL MEDICINE

## 2023-04-05 PROCEDURE — 1126F PR PAIN SEVERITY QUANTIFIED, NO PAIN PRESENT: ICD-10-PCS | Mod: CPTII,S$GLB,, | Performed by: INTERNAL MEDICINE

## 2023-04-05 PROCEDURE — 4010F PR ACE/ARB THEARPY RXD/TAKEN: ICD-10-PCS | Mod: CPTII,S$GLB,, | Performed by: INTERNAL MEDICINE

## 2023-04-05 PROCEDURE — 1159F MED LIST DOCD IN RCRD: CPT | Mod: CPTII,S$GLB,, | Performed by: INTERNAL MEDICINE

## 2023-04-05 PROCEDURE — 4010F ACE/ARB THERAPY RXD/TAKEN: CPT | Mod: CPTII,S$GLB,, | Performed by: INTERNAL MEDICINE

## 2023-04-05 PROCEDURE — 99204 OFFICE O/P NEW MOD 45 MIN: CPT | Mod: S$GLB,,, | Performed by: INTERNAL MEDICINE

## 2023-04-05 PROCEDURE — 1160F RVW MEDS BY RX/DR IN RCRD: CPT | Mod: CPTII,S$GLB,, | Performed by: INTERNAL MEDICINE

## 2023-04-05 PROCEDURE — 3008F BODY MASS INDEX DOCD: CPT | Mod: CPTII,S$GLB,, | Performed by: INTERNAL MEDICINE

## 2023-04-05 PROCEDURE — 99999 PR PBB SHADOW E&M-EST. PATIENT-LVL IV: ICD-10-PCS | Mod: PBBFAC,,, | Performed by: INTERNAL MEDICINE

## 2023-04-05 PROCEDURE — 1159F PR MEDICATION LIST DOCUMENTED IN MEDICAL RECORD: ICD-10-PCS | Mod: CPTII,S$GLB,, | Performed by: INTERNAL MEDICINE

## 2023-04-05 PROCEDURE — 3288F FALL RISK ASSESSMENT DOCD: CPT | Mod: CPTII,S$GLB,, | Performed by: INTERNAL MEDICINE

## 2023-04-05 PROCEDURE — 1101F PR PT FALLS ASSESS DOC 0-1 FALLS W/OUT INJ PAST YR: ICD-10-PCS | Mod: CPTII,S$GLB,, | Performed by: INTERNAL MEDICINE

## 2023-04-05 PROCEDURE — 3044F HG A1C LEVEL LT 7.0%: CPT | Mod: CPTII,S$GLB,, | Performed by: INTERNAL MEDICINE

## 2023-04-05 PROCEDURE — 1101F PT FALLS ASSESS-DOCD LE1/YR: CPT | Mod: CPTII,S$GLB,, | Performed by: INTERNAL MEDICINE

## 2023-04-05 RX ORDER — ROSUVASTATIN CALCIUM 40 MG/1
40 TABLET, COATED ORAL NIGHTLY
Qty: 90 TABLET | Refills: 3 | Status: SHIPPED | OUTPATIENT
Start: 2023-04-05 | End: 2024-04-04

## 2023-04-05 RX ORDER — AMLODIPINE BESYLATE 5 MG/1
5 TABLET ORAL DAILY
Qty: 30 TABLET | Refills: 11 | Status: SHIPPED | OUTPATIENT
Start: 2023-04-05 | End: 2024-04-04

## 2023-04-05 NOTE — PROGRESS NOTES
Subjective:   Patient ID:  Dani Gastelum Jr. is a 67 y.o. male who presents for evaluation of No chief complaint on file.      HPI: Very pleasant man referred to this clinic for evaluation of his coronary artery disease.  In 2016, he saw Dr. Tanner who ordered a calcium scoring CT and that score was 374.  He had a vasodilator stress test with SPECT imaging subsequently and it was free of evidence of obstructive disease.  He has continued on his medical regimen including pravastatin 40mg daily since then.  A repeat calcium scoring CT was repeated for unclear reasons and it was 645.    He denies CASTANEDA, palpitations, PND/orthopnea, lightheadedness and syncope.  He works out at the gym regularly and feels well doing his workouts almost every day.    He says his home BPs run 140s/70s-80s.    Past Medical History:   Diagnosis Date    ALLERGIC RHINITIS     Allergy     BPH (benign prostatic hypertrophy)     Hyperlipidemia     Hypertension     Thalassemia, beta     Thyroid cancer     Vertigo, intermittent        Past Surgical History:   Procedure Laterality Date    DISSECTION OF NECK Left 10/16/2020    Procedure: DISSECTION, NECK Central;  Surgeon: Una Lobato MD;  Location: [facility];  Service: General;  Laterality: Left;    KNEE SURGERY Right     NASAL SEPTUM SURGERY      SHOULDER SURGERY Right     right rotator cuff    THYROIDECTOMY N/A 10/16/2020    Procedure: THYROIDECTOMY- total;  Surgeon: Una Lobato MD;  Location: [facility];  Service: General;  Laterality: N/A;    TRANSURETHRAL RESECTION OF PROSTATE      VASECTOMY         Social History     Tobacco Use    Smoking status: Never    Smokeless tobacco: Never   Substance Use Topics    Alcohol use: No    Drug use: No       Family History   Problem Relation Age of Onset    Hyperlipidemia Mother     Gout Mother     Hypertension Mother     Hyperlipidemia Father     Hypertension Father     Kidney disease Father     Cancer Father         Lung and Renal Cancer     Cancer Brother         myeloma    Cancer Brother         prostate    Heart failure Maternal Grandmother     Heart failure Paternal Grandfather     Heart attack Neg Hx     Heart disease Neg Hx     Stroke Neg Hx        Current Outpatient Medications   Medication Sig    aspirin 81 MG Chew Take 81 mg by mouth once daily.    co-enzyme Q-10 30 mg capsule Take 30 mg by mouth once daily.    dorzolamide-timolol 2-0.5% (COSOPT) 22.3-6.8 mg/mL ophthalmic solution INSTILL 1 DROP IN BOTH EYES TWICE DAILY AS DIRECTED    fish oil-omega-3 fatty acids 300-1,000 mg capsule Take 2 g by mouth once daily.    fluticasone (FLONASE) 50 mcg/actuation nasal spray SPRAY TWICE IN NOSTRIL(S) DAILY AS NEEDED FOR ALLERGIC RHINITIS    FOLIC ACID/MV,FE,OTHER MIN (CENTRUM ORAL) Take by mouth once daily.     glucosamine-chondroitin 500-400 mg tablet Take 1 tablet by mouth 3 (three) times daily. Pt taking one pill once a day.    latanoprost 0.005 % ophthalmic solution INT 1 GTT IN OU QD HS    levothyroxine (SYNTHROID, LEVOTHROID) 175 MCG tablet Take 1 tablet (175 mcg total) by mouth before breakfast.    losartan (COZAAR) 100 MG tablet TAKE 1 TABLET(100 MG) BY MOUTH EVERY DAY    ondansetron (ZOFRAN-ODT) 4 MG TbDL Take 4 mg by mouth once.    pravastatin (PRAVACHOL) 40 MG tablet TAKE 1 TABLET BY MOUTH EVERY DAY    VOLTAREN 1 % Gel      No current facility-administered medications for this visit.       Review of patient's allergies indicates:  No Known Allergies    ROS  The review of systems is negative except as above.    Objective:   Physical Exam  Vitals reviewed.   Constitutional:       Appearance: He is well-developed.   HENT:      Head: Normocephalic and atraumatic.   Eyes:      General: No scleral icterus.     Conjunctiva/sclera: Conjunctivae normal.   Neck:      Vascular: No JVD.   Cardiovascular:      Rate and Rhythm: Normal rate and regular rhythm.      Pulses: Intact distal pulses.      Heart sounds: Normal heart sounds. No murmur heard.    No  friction rub. No gallop.   Pulmonary:      Effort: Pulmonary effort is normal.      Breath sounds: Normal breath sounds. No wheezing or rales.   Abdominal:      General: Bowel sounds are normal. There is no distension.      Palpations: Abdomen is soft.      Tenderness: There is no abdominal tenderness.   Musculoskeletal:         General: Normal range of motion.      Cervical back: Normal range of motion and neck supple.   Skin:     General: Skin is warm and dry.      Findings: No erythema or rash.   Neurological:      Mental Status: He is alert and oriented to person, place, and time.   Psychiatric:         Behavior: Behavior normal.         Thought Content: Thought content normal.         Judgment: Judgment normal.       Lab Results   Component Value Date    WBC 5.40 01/30/2023    HGB 11.9 (L) 01/30/2023    HCT 39.2 (L) 01/30/2023    MCV 78 (L) 01/30/2023     01/30/2023         Chemistry        Component Value Date/Time     01/30/2023 0803    K 4.1 01/30/2023 0803     01/30/2023 0803    CO2 21 (L) 01/30/2023 0803    BUN 15 01/30/2023 0803    CREATININE 1.2 01/30/2023 0803     01/30/2023 0803        Component Value Date/Time    CALCIUM 9.3 01/30/2023 0803    ALKPHOS 47 (L) 01/30/2023 0803    AST 16 01/30/2023 0803    ALT 19 01/30/2023 0803    BILITOT 0.6 01/30/2023 0803    ESTGFRAFRICA >60.0 07/21/2022 0856    EGFRNONAA 56.1 (A) 07/21/2022 0856            Lab Results   Component Value Date    CHOL 140 01/30/2023    CHOL 138 07/21/2022    CHOL 155 03/17/2022     Lab Results   Component Value Date    HDL 34 (L) 01/30/2023    HDL 29 (L) 07/21/2022    HDL 39 (L) 03/17/2022     Lab Results   Component Value Date    LDLCALC 95.2 01/30/2023    LDLCALC 96.2 07/21/2022    LDLCALC 103.0 03/17/2022     Lab Results   Component Value Date    TRIG 54 01/30/2023    TRIG 64 07/21/2022    TRIG 65 03/17/2022     Lab Results   Component Value Date    CHOLHDL 24.3 01/30/2023    CHOLHDL 21.0 07/21/2022     CHOLHDL 25.2 03/17/2022       Lab Results   Component Value Date    TSH 0.339 (L) 01/30/2023       Lab Results   Component Value Date    HGBA1C 5.4 02/10/2023         Assessment:     1. Chest pain, unspecified type    2. Coronary artery calcification seen on CAT scan    3. Essential hypertension    4. Other hyperlipidemia        Plan:     Switch pravastatin 40 to rosuvastatin 40.    Add amlodipine 5 mg.    Diet/exercise goals reinforced.    Hand washing and social distancing stressed.    F/U 12 months

## 2023-04-20 ENCOUNTER — PES CALL (OUTPATIENT)
Dept: ADMINISTRATIVE | Facility: CLINIC | Age: 68
End: 2023-04-20
Payer: MEDICARE

## 2023-04-28 ENCOUNTER — LAB VISIT (OUTPATIENT)
Dept: LAB | Facility: HOSPITAL | Age: 68
End: 2023-04-28
Attending: INTERNAL MEDICINE
Payer: MEDICARE

## 2023-04-28 DIAGNOSIS — E89.0 POSTOPERATIVE HYPOTHYROIDISM: ICD-10-CM

## 2023-04-28 LAB
T4 FREE SERPL-MCNC: 1.28 NG/DL (ref 0.71–1.51)
TSH SERPL DL<=0.005 MIU/L-ACNC: 0.06 UIU/ML (ref 0.4–4)

## 2023-04-28 PROCEDURE — 36415 COLL VENOUS BLD VENIPUNCTURE: CPT | Mod: PN | Performed by: INTERNAL MEDICINE

## 2023-04-28 PROCEDURE — 84443 ASSAY THYROID STIM HORMONE: CPT | Performed by: INTERNAL MEDICINE

## 2023-04-28 PROCEDURE — 84439 ASSAY OF FREE THYROXINE: CPT | Performed by: INTERNAL MEDICINE

## 2023-05-01 ENCOUNTER — PATIENT MESSAGE (OUTPATIENT)
Dept: ENDOCRINOLOGY | Facility: CLINIC | Age: 68
End: 2023-05-01
Payer: MEDICARE

## 2023-05-01 DIAGNOSIS — E89.0 POSTOPERATIVE HYPOTHYROIDISM: Primary | ICD-10-CM

## 2023-05-01 RX ORDER — LEVOTHYROXINE SODIUM 175 UG/1
TABLET ORAL
Qty: 90 TABLET | Refills: 3 | Status: SHIPPED | OUTPATIENT
Start: 2023-05-01 | End: 2023-06-29

## 2023-05-10 ENCOUNTER — TELEPHONE (OUTPATIENT)
Dept: INTERNAL MEDICINE | Facility: CLINIC | Age: 68
End: 2023-05-10
Payer: MEDICARE

## 2023-05-10 NOTE — TELEPHONE ENCOUNTER
Dr alvarez gave 30 x 11 9/29/22.    Last time pharmacist gave him 90 pills.  Today only 30.    They told him that is how  ordered it.  I told him to call us when down to week supply and we can send in 90 day rx  To monique.

## 2023-05-10 NOTE — TELEPHONE ENCOUNTER
----- Message from Kaye Mack sent at 5/10/2023  1:54 PM CDT -----  Contact: Dani   Dani would like a call back. He got a script for his medication Losartan 100 mg filled He said he usually gets a 90 day script but it was only a 30 day script.

## 2023-05-11 ENCOUNTER — OFFICE VISIT (OUTPATIENT)
Dept: OTOLARYNGOLOGY | Facility: CLINIC | Age: 68
End: 2023-05-11
Payer: MEDICARE

## 2023-05-11 ENCOUNTER — CLINICAL SUPPORT (OUTPATIENT)
Dept: OTOLARYNGOLOGY | Facility: CLINIC | Age: 68
End: 2023-05-11
Payer: MEDICARE

## 2023-05-11 VITALS
BODY MASS INDEX: 31.01 KG/M2 | SYSTOLIC BLOOD PRESSURE: 123 MMHG | DIASTOLIC BLOOD PRESSURE: 74 MMHG | TEMPERATURE: 98 F | WEIGHT: 222.31 LBS | HEART RATE: 62 BPM

## 2023-05-11 DIAGNOSIS — H60.63 CHRONIC OTITIS EXTERNA OF BOTH EARS, UNSPECIFIED TYPE: ICD-10-CM

## 2023-05-11 DIAGNOSIS — H90.3 SENSORINEURAL HEARING LOSS (SNHL) OF BOTH EARS: ICD-10-CM

## 2023-05-11 DIAGNOSIS — H90.3 SENSORINEURAL HEARING LOSS (SNHL) OF BOTH EARS: Primary | ICD-10-CM

## 2023-05-11 DIAGNOSIS — J31.0 CHRONIC RHINITIS: Chronic | ICD-10-CM

## 2023-05-11 DIAGNOSIS — H93.19 TINNITUS, UNSPECIFIED LATERALITY: Primary | Chronic | ICD-10-CM

## 2023-05-11 DIAGNOSIS — H93.19 TINNITUS, UNSPECIFIED LATERALITY: ICD-10-CM

## 2023-05-11 DIAGNOSIS — H61.23 BILATERAL IMPACTED CERUMEN: ICD-10-CM

## 2023-05-11 DIAGNOSIS — H69.90 DYSFUNCTION OF EUSTACHIAN TUBE, UNSPECIFIED LATERALITY: Chronic | ICD-10-CM

## 2023-05-11 PROCEDURE — 3078F DIAST BP <80 MM HG: CPT | Mod: CPTII,S$GLB,, | Performed by: OTOLARYNGOLOGY

## 2023-05-11 PROCEDURE — 69210 REMOVE IMPACTED EAR WAX UNI: CPT | Mod: S$GLB,,, | Performed by: OTOLARYNGOLOGY

## 2023-05-11 PROCEDURE — 99999 PR PBB SHADOW E&M-EST. PATIENT-LVL IV: CPT | Mod: PBBFAC,,, | Performed by: OTOLARYNGOLOGY

## 2023-05-11 PROCEDURE — 92557 PR COMPREHENSIVE HEARING TEST: ICD-10-PCS | Mod: S$GLB,,, | Performed by: PHYSICIAN ASSISTANT

## 2023-05-11 PROCEDURE — 1126F AMNT PAIN NOTED NONE PRSNT: CPT | Mod: CPTII,S$GLB,, | Performed by: OTOLARYNGOLOGY

## 2023-05-11 PROCEDURE — 3288F FALL RISK ASSESSMENT DOCD: CPT | Mod: CPTII,S$GLB,, | Performed by: OTOLARYNGOLOGY

## 2023-05-11 PROCEDURE — 1101F PR PT FALLS ASSESS DOC 0-1 FALLS W/OUT INJ PAST YR: ICD-10-PCS | Mod: CPTII,S$GLB,, | Performed by: OTOLARYNGOLOGY

## 2023-05-11 PROCEDURE — 3078F PR MOST RECENT DIASTOLIC BLOOD PRESSURE < 80 MM HG: ICD-10-PCS | Mod: CPTII,S$GLB,, | Performed by: OTOLARYNGOLOGY

## 2023-05-11 PROCEDURE — 1157F ADVNC CARE PLAN IN RCRD: CPT | Mod: CPTII,S$GLB,, | Performed by: OTOLARYNGOLOGY

## 2023-05-11 PROCEDURE — 92557 COMPREHENSIVE HEARING TEST: CPT | Mod: S$GLB,,, | Performed by: PHYSICIAN ASSISTANT

## 2023-05-11 PROCEDURE — 1159F MED LIST DOCD IN RCRD: CPT | Mod: CPTII,S$GLB,, | Performed by: OTOLARYNGOLOGY

## 2023-05-11 PROCEDURE — 1157F PR ADVANCE CARE PLAN OR EQUIV PRESENT IN MEDICAL RECORD: ICD-10-PCS | Mod: CPTII,S$GLB,, | Performed by: OTOLARYNGOLOGY

## 2023-05-11 PROCEDURE — 4010F ACE/ARB THERAPY RXD/TAKEN: CPT | Mod: CPTII,S$GLB,, | Performed by: OTOLARYNGOLOGY

## 2023-05-11 PROCEDURE — 92567 TYMPANOMETRY: CPT | Mod: S$GLB,,, | Performed by: PHYSICIAN ASSISTANT

## 2023-05-11 PROCEDURE — 92567 PR TYMPA2METRY: ICD-10-PCS | Mod: S$GLB,,, | Performed by: PHYSICIAN ASSISTANT

## 2023-05-11 PROCEDURE — 99204 OFFICE O/P NEW MOD 45 MIN: CPT | Mod: 25,S$GLB,, | Performed by: OTOLARYNGOLOGY

## 2023-05-11 PROCEDURE — 3074F SYST BP LT 130 MM HG: CPT | Mod: CPTII,S$GLB,, | Performed by: OTOLARYNGOLOGY

## 2023-05-11 PROCEDURE — 3044F HG A1C LEVEL LT 7.0%: CPT | Mod: CPTII,S$GLB,, | Performed by: OTOLARYNGOLOGY

## 2023-05-11 PROCEDURE — 3008F BODY MASS INDEX DOCD: CPT | Mod: CPTII,S$GLB,, | Performed by: OTOLARYNGOLOGY

## 2023-05-11 PROCEDURE — 3288F PR FALLS RISK ASSESSMENT DOCUMENTED: ICD-10-PCS | Mod: CPTII,S$GLB,, | Performed by: OTOLARYNGOLOGY

## 2023-05-11 PROCEDURE — 4010F PR ACE/ARB THEARPY RXD/TAKEN: ICD-10-PCS | Mod: CPTII,S$GLB,, | Performed by: OTOLARYNGOLOGY

## 2023-05-11 PROCEDURE — 3008F PR BODY MASS INDEX (BMI) DOCUMENTED: ICD-10-PCS | Mod: CPTII,S$GLB,, | Performed by: OTOLARYNGOLOGY

## 2023-05-11 PROCEDURE — 1160F PR REVIEW ALL MEDS BY PRESCRIBER/CLIN PHARMACIST DOCUMENTED: ICD-10-PCS | Mod: CPTII,S$GLB,, | Performed by: OTOLARYNGOLOGY

## 2023-05-11 PROCEDURE — 3074F PR MOST RECENT SYSTOLIC BLOOD PRESSURE < 130 MM HG: ICD-10-PCS | Mod: CPTII,S$GLB,, | Performed by: OTOLARYNGOLOGY

## 2023-05-11 PROCEDURE — 1126F PR PAIN SEVERITY QUANTIFIED, NO PAIN PRESENT: ICD-10-PCS | Mod: CPTII,S$GLB,, | Performed by: OTOLARYNGOLOGY

## 2023-05-11 PROCEDURE — 99999 PR PBB SHADOW E&M-EST. PATIENT-LVL IV: ICD-10-PCS | Mod: PBBFAC,,, | Performed by: OTOLARYNGOLOGY

## 2023-05-11 PROCEDURE — 69210 EAR CERUMEN REMOVAL: ICD-10-PCS | Mod: S$GLB,,, | Performed by: OTOLARYNGOLOGY

## 2023-05-11 PROCEDURE — 3044F PR MOST RECENT HEMOGLOBIN A1C LEVEL <7.0%: ICD-10-PCS | Mod: CPTII,S$GLB,, | Performed by: OTOLARYNGOLOGY

## 2023-05-11 PROCEDURE — 1160F RVW MEDS BY RX/DR IN RCRD: CPT | Mod: CPTII,S$GLB,, | Performed by: OTOLARYNGOLOGY

## 2023-05-11 PROCEDURE — 1101F PT FALLS ASSESS-DOCD LE1/YR: CPT | Mod: CPTII,S$GLB,, | Performed by: OTOLARYNGOLOGY

## 2023-05-11 PROCEDURE — 1159F PR MEDICATION LIST DOCUMENTED IN MEDICAL RECORD: ICD-10-PCS | Mod: CPTII,S$GLB,, | Performed by: OTOLARYNGOLOGY

## 2023-05-11 PROCEDURE — 99204 PR OFFICE/OUTPT VISIT, NEW, LEVL IV, 45-59 MIN: ICD-10-PCS | Mod: 25,S$GLB,, | Performed by: OTOLARYNGOLOGY

## 2023-05-11 RX ORDER — FLUTICASONE PROPIONATE 50 MCG
1 SPRAY, SUSPENSION (ML) NASAL 2 TIMES DAILY
Qty: 11.1 ML | Refills: 3 | Status: SHIPPED | OUTPATIENT
Start: 2023-05-11 | End: 2023-08-09

## 2023-05-11 NOTE — PATIENT INSTRUCTIONS
I would recommend the use of a wax softening drop, either over the counter Debrox, baby oil, or mineral oil, on a weekly basis.  I also instructed the patient to avoid Qtips.     Audiogram was reviewed in detail with the patient, and they understand their hearing loss.    If and when they are a candidate for hearing aids, they were given information on how to contact the audiologist for this appointment.  I recommend annual audiograms as well as hearing protection in noise.       We had a long discussion regarding the anatomy and function of the eustachian tube.  We discussed that the eustachian tube acts as a pump to keep the appropriate amount of pressure behind the ear drum.  I gave the patient a prescription for a nasal steroid spray to be used on a daily basis, and we discussed that it will take 2-3 weeks of daily use to achieve maximal effectiveness.  We also discussed otologic valsalva daily for gently depressurizing the middle ear.       How do you use a Nasal Corticosteroid Spray?    Make sure you understand your dosing instructions. Spray only the number of prescribed sprays in each nostril. Read the package instructions before using your spray the first time.    Most corticosteroid sprays suggest the following steps:    Wash your hands well.    Gently blow your nose to clear the passageway.    Shake the container several times.    Tilt your head slightly downward.  Use the opposite hand from the nostril you will be spraying to hold the spray bottle.    Block one nostril with your finger.  Insert the nasal applicator into the other nostril.    Aim the spray toward the outer wall of the nostril.  Inhale slowly through the nose and press the .    Breathe out and repeat to apply the prescribed number of sprays.  Repeat these steps for the other nostril.     Avoid sneezing or blowing your nose right after spraying.          WHAT IS TINNITUS?  The perception of sound heard in one or both sides  of the head. Some refer to it as a ringing, noise, buzzing, roaring, clicking, wooshing, crickets, heartbeat, music, or other noises. There are varying levels of severity. The tinnitus may be constant or periodic. Common complaints include difficulty sleeping, struggling to understand other's speech, depression, and problems focusing.  Tinnitus is a symptom, not a disease. It affects 10-15% of adults in the [place].    WHAT CAN YOU DO?  You should seek medical care if you suspect you have tinnitus and tell your physician if your symptoms are affecting your daily life. Tinnitus may cause anxiety, depression, insomnia, negative emotions, and withdrawal. It's okay to seek help as your symptoms may be managed, and common.    HOW IS TINNITUS DIAGNOSED?  A doctor can diagnose tinnitus after a physical examination and interview. The examination may rule out conditions causing the tinnitus such as wax impaction or fluid behind the eardrum. Tinnitus may also be related to hearing loss, especially hearing loss from frequent noise exposure. A hearing test may be performed if you are experiencing tinnitus.    TREATMENT FOR TINNITUS?  Treatment may improve on its own but if it doesn't there are several ways to manage your symptoms although there is no cure. Possible treatment options include amplification (hearing aids), sound therapy (maskers,white noise,etc.), TMJ treatment, cognitive behavioral therapy, relaxation exercises, and managing your medications.  There is not enough evidence to suggest that over the counter products help patients with tinnitus. Acupuncture can neither be recommended or discouraged due to lack of evidence as well.    Source: American Academy of Otolaryngology-Head And Neck Surgery    ORGANIZATIONS AND LINKS FOR TINNITUS AND HEARING LOSS    American Academy of Audiology      www.audiology.org  American Tinnitus Associate        www.kem.org  American Academy of Otolaryngology, Head & Neck  Surgery www.entnet.org  American Auditory Society     www.amauditorysoc.org  Better Hearing [place]      www.betterhearing.org  EarHelp        www.earhelp.co.uk/  Hearing Education and Awareness for [employer] (HEAR)  www.hearnet.[employer]  Hearing Loss Association of [place]    www.hearingloss.[employer]   Hear-It        www.hear-it.org  Healthy Hearing       www.[employer] [place].[employer]itech   Sight and Hearing Association     www.sightandhearing.org

## 2023-05-11 NOTE — PROGRESS NOTES
Chief Complaint   Patient presents with    Cerumen Impaction    Tinnitus     Right ear        HPI:  Patient is a very pleasant 67 y.o. male here to see me today for the first time for evaluation of tinnitus. reports tinnitus that has been gradually progressing over the last many year(s).  He notes that it is primarily bilateral. He states that the tinnitus does not interfere with communication, concentration, sleep, and enjoyment of life. He  also admits to hearing loss that has been gradually progressing over the last few years.  He has not noted any difference in hearing between the ears, with both ears being the better hearing ear. He has not had any recent issues with ear pain or ear drainage. He denies a family history of hearing loss, and has not had any previous otologic surgery. He has any history of significant loud noise exposure.He denies issues with dizziness.    Patient notes history of nasal congestion, rhinitis, ear popping that he does feel interferes with his hearing.  He has been prescribed Flonase many years ago by his PCP, but is not currently using any medications for rhinitis symptoms.            Past Medical History:   Diagnosis Date    ALLERGIC RHINITIS     Allergy     BPH (benign prostatic hypertrophy)     Hyperlipidemia     Hypertension     Thalassemia, beta     Thyroid cancer     Vertigo, intermittent      Social History     Socioeconomic History    Marital status:    Occupational History    Occupation:      Employer: [employer]   Tobacco Use    Smoking status: Never    Smokeless tobacco: Never   Substance and Sexual Activity    Alcohol use: No    Drug use: No    Sexual activity: Yes     Partners: Female     Birth control/protection: None     Past Surgical History:   Procedure Laterality Date    DISSECTION OF NECK Left 10/16/2020    Procedure: DISSECTION, NECK Central;  Surgeon: Una Lobato MD;  Location: [facility];  Service: General;  Laterality:  Left;    KNEE SURGERY Right     NASAL SEPTUM SURGERY      SHOULDER SURGERY Right     right rotator cuff    THYROIDECTOMY N/A 10/16/2020    Procedure: THYROIDECTOMY- total;  Surgeon: Una Lobato MD;  Location: [facility];  Service: General;  Laterality: N/A;    TRANSURETHRAL RESECTION OF PROSTATE      VASECTOMY       Family History   Problem Relation Age of Onset    Hyperlipidemia Mother     Gout Mother     Hypertension Mother     Hyperlipidemia Father     Hypertension Father     Kidney disease Father     Cancer Father         Lung and Renal Cancer    Cancer Brother         myeloma    Cancer Brother         prostate    Heart failure Maternal Grandmother     Heart failure Paternal Grandfather     Heart attack Neg Hx     Heart disease Neg Hx     Stroke Neg Hx            Review of Systems  General: negative for chills, fever or weight loss  Psychological: negative for mood changes or depression  Ophthalmic: negative for blurry vision, photophobia or eye pain  ENT: see HPI  Respiratory: no cough, shortness of breath, or wheezing  Cardiovascular: no chest pain or dyspnea on exertion  Gastrointestinal: no abdominal pain, change in bowel habits, or black/ bloody stools  Musculoskeletal: negative for gait disturbance or muscular weakness  Neurological: no syncope or seizures; no ataxia  Dermatological: negative for pruritis,  rash and jaundice  Hematologic/lymphatic: no easy bruising, no new adenopathy      Physical Exam:    Vitals:    05/11/23 0933   BP: 123/74   Pulse: 62   Temp: 98.1 °F (36.7 °C)         Constitutional:   He is oriented to person, place, and time. Vital signs are normal. He appears well-developed and well-nourished. He appears alert. He is cooperative.  Non-toxic appearance. Normal speech.      Head:  Normocephalic and atraumatic. Salivary glands normal.  Facial strength is normal.      Ears:    Right Ear: Tympanic membrane is not perforated, not erythematous and not retracted. No middle ear  effusion.   Left Ear: Tympanic membrane is not perforated, not erythematous and not retracted.  No middle ear effusion.   Ears:      Nose:  Mucosal edema (congested mucosa throughout) present. No rhinorrhea, septal deviation, nasal septal hematoma or polyps. No epistaxis. Turbinate hypertrophy (2+ size, boggy, congested bilaterally).  No turbinate masses.  Right sinus exhibits no maxillary sinus tenderness and no frontal sinus tenderness. Left sinus exhibits no maxillary sinus tenderness and no frontal sinus tenderness.     Mouth/Throat  Oropharynx clear and moist without lesions or asymmetry, normal uvula midline, lips, teeth, and gums normal and oropharynx normal. Normal dentition. No uvula swelling or oral lesions. No oropharyngeal exudate, posterior oropharyngeal edema or posterior oropharyngeal erythema. Tonsillar erythema, tonsillar hyperemia, tonsillar exudate.  Mirror exam not performed due to patient tolerance.  Mirror exam not performed due to patient tolerance.      Neck:  Neck normal without thyromegaly masses, asymmetry, normal tracheal structure, crepitus, and tenderness, thyroid normal, trachea normal, full range of motion with neck supple and no adenopathy. No JVD present. Carotid bruit is not present. Thyroid tenderness is present. No edema and no erythema present. No thyroid mass and no thyromegaly present.     He has no cervical adenopathy.     Cardiovascular:    Normal rate, regular rhythm, normal heart sounds and rate and rhythm, heart sounds, and pulses normal.              Pulmonary/Chest:   Effort and breath sounds normal.     Psychiatric:   He has a normal mood and affect. His speech is normal and behavior is normal.     Neurological:   He is alert and oriented to person, place, and time. He has neurological normal, alert and oriented. No cranial nerve deficit.     Skin:   No abrasions, lacerations, lesions, or rashes.     Ear Cerumen Removal    Date/Time: 5/11/2023 9:40 AM  Performed by:  Glenda Macdonald MD  Authorized by: Glenda Macdonald MD     Consent Done?:  Yes (Verbal)  Location details:  Both ears  Procedure type: curette    Procedure type comment:  Forceps  Cerumen  Removal Results:  Cerumen completely removed  Patient tolerance:  Patient tolerated the procedure well with no immediate complications     Dry skin and skin irritation bilateral ear canals, consistent with chronic otitis externa.        Audiogram: Interpreted by me and reviewed with the patient today.  Bilateral high-frequency sensorineural hearing loss, symmetric.  Tympanograms type a bilaterally.  Speech discrimination symmetric.              Assessment:    ICD-10-CM ICD-9-CM    1. Tinnitus, unspecified laterality  H93.19 388.30 Ambulatory referral/consult to ENT      2. Sensorineural hearing loss (SNHL) of both ears  H90.3 389.18 Ambulatory referral/consult to ENT      3. Chronic rhinitis  J31.0 472.0 fluticasone propionate (FLONASE) 50 mcg/actuation nasal spray      4. Dysfunction of Eustachian tube, unspecified laterality  H69.80 381.81 fluticasone propionate (FLONASE) 50 mcg/actuation nasal spray      5. Chronic otitis externa of both ears, unspecified type  H60.63 380.23       6. Bilateral impacted cerumen  H61.23 380.4 Ear Cerumen Removal        The primary encounter diagnosis was Tinnitus, unspecified laterality. Diagnoses of Sensorineural hearing loss (SNHL) of both ears, Chronic rhinitis, Dysfunction of Eustachian tube, unspecified laterality, Chronic otitis externa of both ears, unspecified type, and Bilateral impacted cerumen were also pertinent to this visit.      Plan:    Audiogram was reviewed in detail with the patient, and they understand their hearing loss.    If and when they are a candidate for hearing aids, they were given information on how to contact the audiologist for this appointment.  I recommend annual audiograms as well as hearing protection in noise.  Gave patient information and website links for  tinnitus and coping strategies.    I would recommend the use of a wax softening drop, either over the counter Debrox, baby oil, or mineral oil, on a weekly basis.  I also instructed the patient to avoid Qtips.    We had a long discussion regarding the anatomy and function of the eustachian tube.  We discussed that the eustachian tube acts as a pump to keep the appropriate amount of pressure behind the ear drum.  I gave the patient a prescription for a nasal steroid spray to be used on a daily basis, and we discussed that it will take 2-3 weeks of daily use to achieve maximal effectiveness.  We also discussed otologic valsalva daily for gently depressurizing the middle ear.        Glenda Macdonald MD

## 2023-05-13 NOTE — PROGRESS NOTES
Dani Gastelum Jr., a 67 y.o. male, was seen today in the clinic for an audiologic evaluation.  Mr. Gastelum reports tinnitus and hearing loss that has been gradually progressing over the years.          Audiogram results revealed a bilateral mild to moderate sensorineural hearing loss.  Speech reception thresholds were noted at 20 dB in the right ear and 15 dB in the left ear.  Speech discrimination scores were 88% in the right ear and 88% in the left ear.  Tympanometry revealed Type A in the right ear and Type Ad in the left ear.     Recommendations:  Otologic evaluation  Annual audiogram  Hearing protection when in noise  Hearing aid consultation         Post-Care Instructions: I reviewed with the patient in detail post-care instructions. Patient is to wear sunprotection, and avoid picking at any of the treated lesions. Pt may apply Vaseline to crusted or scabbing areas. Include Z78.9 (Other Specified Conditions Influencing Health Status) As An Associated Diagnosis?: No Medical Necessity Clause: This procedure was medically necessary because the lesions that were treated were: Consent: The patient's consent was obtained including but not limited to risks of crusting, scabbing, blistering, scarring, darker or lighter pigmentary change, recurrence, incomplete removal and infection. Medical Necessity Information: It is in your best interest to select a reason for this procedure from the list below. All of these items fulfill various CMS LCD requirements except the new and changing color options. Detail Level: Detailed Number Of Freeze-Thaw Cycles: 2 freeze-thaw cycles Duration Of Freeze Thaw-Cycle (Seconds): 0

## 2023-06-07 ENCOUNTER — TELEPHONE (OUTPATIENT)
Dept: INTERNAL MEDICINE | Facility: CLINIC | Age: 68
End: 2023-06-07
Payer: MEDICARE

## 2023-06-07 RX ORDER — LOSARTAN POTASSIUM 100 MG/1
100 TABLET ORAL DAILY
Qty: 90 TABLET | Refills: 3 | Status: SHIPPED | OUTPATIENT
Start: 2023-06-07

## 2023-06-07 NOTE — TELEPHONE ENCOUNTER
----- Message from Chad Elizabeth sent at 6/7/2023 10:05 AM CDT -----  Contact: [phone]  Requesting an RX refill or new RX.  Is this a refill or new RX: refill  RX name and strength (copy/paste from chart):  losartan (COZAAR) 100 MG tablet  Is this a 30 day or 90 day RX: 90  Pharmacy name and phone # (copy/paste from chart):    [id number] - [facility] [address]  [address]  [facility] [address]  Phone: [phone] Fax: [phone]      The doctors have asked that we provide their patients with the following 2 reminders -- prescription refills can take up to 72 hours, and a friendly reminder that in the future you can use your [facility] account to request refills: call back

## 2023-06-20 ENCOUNTER — TELEPHONE (OUTPATIENT)
Dept: ENDOCRINOLOGY | Facility: CLINIC | Age: 68
End: 2023-06-20
Payer: MEDICARE

## 2023-06-20 NOTE — TELEPHONE ENCOUNTER
Spoke with patient with his concern , I told patient his labs is scheduled and if he need an appointment the provider will inform me ,

## 2023-06-26 ENCOUNTER — LAB VISIT (OUTPATIENT)
Dept: LAB | Facility: HOSPITAL | Age: 68
End: 2023-06-26
Attending: INTERNAL MEDICINE
Payer: MEDICARE

## 2023-06-26 DIAGNOSIS — E89.0 POSTOPERATIVE HYPOTHYROIDISM: ICD-10-CM

## 2023-06-26 LAB — TSH SERPL DL<=0.005 MIU/L-ACNC: 0.04 UIU/ML (ref 0.4–4)

## 2023-06-26 PROCEDURE — 84443 ASSAY THYROID STIM HORMONE: CPT | Performed by: INTERNAL MEDICINE

## 2023-06-26 PROCEDURE — 36415 COLL VENOUS BLD VENIPUNCTURE: CPT | Mod: PN | Performed by: INTERNAL MEDICINE

## 2023-06-26 PROCEDURE — 84439 ASSAY OF FREE THYROXINE: CPT | Performed by: INTERNAL MEDICINE

## 2023-06-27 LAB — T4 FREE SERPL-MCNC: 1.36 NG/DL (ref 0.71–1.51)

## 2023-06-29 ENCOUNTER — TELEPHONE (OUTPATIENT)
Dept: ENDOCRINOLOGY | Facility: CLINIC | Age: 68
End: 2023-06-29
Payer: MEDICARE

## 2023-06-29 DIAGNOSIS — E89.0 POSTOPERATIVE HYPOTHYROIDISM: Primary | ICD-10-CM

## 2023-06-29 RX ORDER — LEVOTHYROXINE SODIUM 150 UG/1
150 TABLET ORAL
Qty: 90 TABLET | Refills: 3 | Status: SHIPPED | OUTPATIENT
Start: 2023-06-29 | End: 2023-08-25

## 2023-06-29 NOTE — TELEPHONE ENCOUNTER
Please advise patient that I reviewed his labs. It looks like he is still getting too much thyroid hormone. I recommend we reduce his dose to 150 mcg once daily. I'd like to repeat his TSH level in 8 weeks.

## 2023-06-30 ENCOUNTER — PES CALL (OUTPATIENT)
Dept: ADMINISTRATIVE | Facility: CLINIC | Age: 68
End: 2023-06-30
Payer: MEDICARE

## 2023-07-26 ENCOUNTER — OFFICE VISIT (OUTPATIENT)
Dept: URGENT CARE | Facility: CLINIC | Age: 68
End: 2023-07-26
Payer: MEDICARE

## 2023-07-26 VITALS
WEIGHT: 220 LBS | DIASTOLIC BLOOD PRESSURE: 70 MMHG | OXYGEN SATURATION: 98 % | RESPIRATION RATE: 18 BRPM | SYSTOLIC BLOOD PRESSURE: 114 MMHG | BODY MASS INDEX: 30.8 KG/M2 | HEART RATE: 71 BPM | HEIGHT: 71 IN | TEMPERATURE: 98 F

## 2023-07-26 DIAGNOSIS — J30.9 ALLERGIC RHINITIS, UNSPECIFIED SEASONALITY, UNSPECIFIED TRIGGER: ICD-10-CM

## 2023-07-26 DIAGNOSIS — R09.81 SINUS CONGESTION: ICD-10-CM

## 2023-07-26 DIAGNOSIS — J06.9 ACUTE URI: ICD-10-CM

## 2023-07-26 DIAGNOSIS — R53.83 FATIGUE, UNSPECIFIED TYPE: Primary | ICD-10-CM

## 2023-07-26 LAB
BILIRUB UR QL STRIP: NEGATIVE
CTP QC/QA: YES
GLUCOSE SERPL-MCNC: 106 MG/DL (ref 70–110)
GLUCOSE UR QL STRIP: NEGATIVE
HGB, POC: 11.6 G/DL (ref 13–17)
KETONES UR QL STRIP: NEGATIVE
LEUKOCYTE ESTERASE UR QL STRIP: NEGATIVE
PH, POC UA: 5 (ref 5–8)
POC BLOOD, URINE: NEGATIVE
POC NITRATES, URINE: NEGATIVE
PROT UR QL STRIP: NEGATIVE
SARS-COV-2 AG RESP QL IA.RAPID: NEGATIVE
SP GR UR STRIP: 1.02 (ref 1–1.03)
UROBILINOGEN UR STRIP-ACNC: NORMAL (ref 0.3–2.2)

## 2023-07-26 PROCEDURE — 82962 GLUCOSE BLOOD TEST: CPT | Mod: S$GLB,,, | Performed by: FAMILY MEDICINE

## 2023-07-26 PROCEDURE — 99214 OFFICE O/P EST MOD 30 MIN: CPT | Mod: S$GLB,,, | Performed by: FAMILY MEDICINE

## 2023-07-26 PROCEDURE — 81003 URINALYSIS AUTO W/O SCOPE: CPT | Mod: QW,S$GLB,, | Performed by: FAMILY MEDICINE

## 2023-07-26 PROCEDURE — 85018 HEMOGLOBIN: CPT | Mod: QW,S$GLB,, | Performed by: FAMILY MEDICINE

## 2023-07-26 PROCEDURE — 93005 EKG 12-LEAD: ICD-10-PCS | Mod: S$GLB,,, | Performed by: FAMILY MEDICINE

## 2023-07-26 PROCEDURE — 93010 ELECTROCARDIOGRAM REPORT: CPT | Mod: S$GLB,,, | Performed by: INTERNAL MEDICINE

## 2023-07-26 PROCEDURE — 85018 POCT HEMOGLOBIN: ICD-10-PCS | Mod: QW,S$GLB,, | Performed by: FAMILY MEDICINE

## 2023-07-26 PROCEDURE — 81003 POCT URINALYSIS, DIPSTICK, AUTOMATED, W/O SCOPE: ICD-10-PCS | Mod: QW,S$GLB,, | Performed by: FAMILY MEDICINE

## 2023-07-26 PROCEDURE — 99214 PR OFFICE/OUTPT VISIT, EST, LEVL IV, 30-39 MIN: ICD-10-PCS | Mod: S$GLB,,, | Performed by: FAMILY MEDICINE

## 2023-07-26 PROCEDURE — 82962 POCT GLUCOSE, HAND-HELD DEVICE: ICD-10-PCS | Mod: S$GLB,,, | Performed by: FAMILY MEDICINE

## 2023-07-26 PROCEDURE — 93010 EKG 12-LEAD: ICD-10-PCS | Mod: S$GLB,,, | Performed by: INTERNAL MEDICINE

## 2023-07-26 PROCEDURE — 87811 SARS-COV-2 COVID19 W/OPTIC: CPT | Mod: QW,S$GLB,, | Performed by: FAMILY MEDICINE

## 2023-07-26 PROCEDURE — 87811 SARS CORONAVIRUS 2 ANTIGEN POCT, MANUAL READ: ICD-10-PCS | Mod: QW,S$GLB,, | Performed by: FAMILY MEDICINE

## 2023-07-26 PROCEDURE — 93005 ELECTROCARDIOGRAM TRACING: CPT | Mod: S$GLB,,, | Performed by: FAMILY MEDICINE

## 2023-07-26 NOTE — PROGRESS NOTES
"Subjective:      Patient ID: Dani Gastelum Jr. is a 67 y.o. male.    Vitals:  height is 5' 11" (1.803 m) and weight is 99.8 kg (220 lb). His oral temperature is 98.1 °F (36.7 °C). His blood pressure is 114/70 and his pulse is 71. His respiration is 18 and oxygen saturation is 98%.     Chief Complaint: Fatigue    Patient presents with complaint of sinus congestion, body aches and excessive fatigue for last 3 days. Pt denies fever, chills, CP, SOB, HA, weakness/dizziness, N/V, diarrhea, abdominal pain, dysuria, loss of smell or taste.  Patient is requesting a covid test.  Patient reports history of sinus allergies.    Fatigue  This is a new problem. The current episode started in the past 7 days. The problem occurs constantly. The problem has been unchanged. Associated symptoms include fatigue. Pertinent negatives include no congestion, coughing, headaches or sore throat. Nothing aggravates the symptoms. He has tried nothing for the symptoms. The treatment provided no relief.     Constitution: Positive for fatigue.   HENT:  Negative for congestion and sore throat.    Respiratory:  Negative for cough.    Neurological:  Negative for headaches.    Objective:     Physical Exam   Constitutional: He is oriented to person, place, and time. He appears well-developed. He is cooperative.   HENT:   Head: Normocephalic and atraumatic.   Ears:   Right Ear: Hearing, tympanic membrane, external ear and ear canal normal. impacted cerumen  Left Ear: Hearing, tympanic membrane, external ear and ear canal normal. impacted cerumen  Nose: Congestion present. No mucosal edema, rhinorrhea or nasal deformity. No epistaxis. Right sinus exhibits no maxillary sinus tenderness and no frontal sinus tenderness. Left sinus exhibits no maxillary sinus tenderness and no frontal sinus tenderness.   Mouth/Throat: Uvula is midline, oropharynx is clear and moist and mucous membranes are normal. No trismus in the jaw. Normal dentition. No uvula swelling. " No oropharyngeal exudate or posterior oropharyngeal erythema.   Eyes: Conjunctivae and lids are normal.   Neck: Trachea normal and phonation normal. Neck supple.   Cardiovascular: Normal rate, regular rhythm, normal heart sounds and normal pulses.   Pulmonary/Chest: Effort normal and breath sounds normal.   Abdominal: Normal appearance and bowel sounds are normal. He exhibits no distension. Soft. There is no abdominal tenderness. There is no left CVA tenderness and no right CVA tenderness.   Musculoskeletal: Normal range of motion.         General: Normal range of motion.      Cervical back: He exhibits no tenderness.   Lymphadenopathy:     He has no cervical adenopathy.   Neurological: He is alert, oriented to person, place, and time and at baseline. He displays no weakness. No cranial nerve deficit or sensory deficit. He exhibits normal muscle tone.   Skin: Skin is warm, dry and intact.   Psychiatric: His speech is normal and behavior is normal. Mood, judgment and thought content normal.   Nursing note and vitals reviewed.    Assessment:     1. Fatigue, unspecified type    2. Acute URI    3. Sinus congestion    4. Allergic rhinitis, unspecified seasonality, unspecified trigger        Plan:   Negative COVID test.  Considering patient's significant cardiac history ECG is done which shows normal sinus rhythm and nonspecific T-wave abnormality.  Changes are consistent with previous EKG 4 months ago.  Glucose and UA are normal.  Hemoglobin is 11.6 which is close to his baseline.  Discussed results/diagnosis/plan with patient in clinic. Advised to f/u with PCP within 2-5 days. ER precautions given if symptoms get any worse. All questions answered. Patient verbally understood and agreed with treatment plan.     Fatigue, unspecified type  -     SARS Coronavirus 2 Antigen, POCT Manual Read  -     POCT Glucose, Hand-Held Device  -     POCT hemoglobin  -     POCT Urinalysis, Dipstick, Automated, W/O Scope  -     EKG 12-lead;  Future    Acute URI    Sinus congestion    Allergic rhinitis, unspecified seasonality, unspecified trigger

## 2023-08-24 ENCOUNTER — LAB VISIT (OUTPATIENT)
Dept: LAB | Facility: HOSPITAL | Age: 68
End: 2023-08-24
Attending: INTERNAL MEDICINE
Payer: MEDICARE

## 2023-08-24 DIAGNOSIS — E89.0 POSTOPERATIVE HYPOTHYROIDISM: ICD-10-CM

## 2023-08-24 LAB
T4 FREE SERPL-MCNC: 1.38 NG/DL (ref 0.71–1.51)
TSH SERPL DL<=0.005 MIU/L-ACNC: 0.12 UIU/ML (ref 0.4–4)

## 2023-08-24 PROCEDURE — 84443 ASSAY THYROID STIM HORMONE: CPT | Performed by: INTERNAL MEDICINE

## 2023-08-24 PROCEDURE — 36415 COLL VENOUS BLD VENIPUNCTURE: CPT | Mod: PN | Performed by: INTERNAL MEDICINE

## 2023-08-24 PROCEDURE — 84439 ASSAY OF FREE THYROXINE: CPT | Performed by: INTERNAL MEDICINE

## 2023-08-25 ENCOUNTER — TELEPHONE (OUTPATIENT)
Dept: ENDOCRINOLOGY | Facility: CLINIC | Age: 68
End: 2023-08-25
Payer: MEDICARE

## 2023-08-25 DIAGNOSIS — E89.0 POSTOPERATIVE HYPOTHYROIDISM: Primary | ICD-10-CM

## 2023-08-25 RX ORDER — LEVOTHYROXINE SODIUM 137 UG/1
137 TABLET ORAL
Qty: 90 TABLET | Refills: 3 | Status: SHIPPED | OUTPATIENT
Start: 2023-08-25 | End: 2024-08-24

## 2023-08-25 NOTE — TELEPHONE ENCOUNTER
Please advise patient that I reviewed his labs. It looks like he is still getting too much thyroid hormone. I recommend we reduce his dose to 137 mcg once daily. I'd like to repeat his TSH level in 8 weeks.

## 2023-09-25 ENCOUNTER — TELEPHONE (OUTPATIENT)
Dept: INTERNAL MEDICINE | Facility: CLINIC | Age: 68
End: 2023-09-25
Payer: MEDICARE

## 2023-09-25 DIAGNOSIS — I10 ESSENTIAL HYPERTENSION: Primary | ICD-10-CM

## 2023-09-25 DIAGNOSIS — E78.49 OTHER HYPERLIPIDEMIA: ICD-10-CM

## 2023-09-25 NOTE — TELEPHONE ENCOUNTER
Naomi Rich MA   Sent:   3:28 PM   To: Naomi Rich MA [facility] J. Smith Jr.   MRN: [id number] : 1955   Pt Home: [phone]     Entered: [phone]        Message       ----- Message -----   From: Yusuf Perry LPN   Sent: 2023   3:00 PM CDT   To: Sergio Mao   Subject: FW: Appointment Request                               ----- Message -----   From: Dani Gastelum Jr.   Sent: 2023   3:54 PM CDT   To: [facility] Clinical Support   Subject: Appointment Request                                Appointment Request From: Dani Gastelum Jr.      With Provider: Frandy Hill MD [[facility]]      Preferred Date Range: Any      Preferred Times: Any Time      Reason for visit: routine labs ?-and visit  to  office      Comments:   cbc/psa

## 2023-10-29 ENCOUNTER — OCCUPATIONAL HEALTH (OUTPATIENT)
Dept: URGENT CARE | Facility: CLINIC | Age: 68
End: 2023-10-29

## 2023-10-29 VITALS
WEIGHT: 220 LBS | RESPIRATION RATE: 16 BRPM | BODY MASS INDEX: 30.8 KG/M2 | HEIGHT: 71 IN | DIASTOLIC BLOOD PRESSURE: 82 MMHG | TEMPERATURE: 97 F | HEART RATE: 72 BPM | SYSTOLIC BLOOD PRESSURE: 127 MMHG | OXYGEN SATURATION: 96 %

## 2023-10-29 DIAGNOSIS — Z23 ENCOUNTER FOR VACCINATION: Primary | ICD-10-CM

## 2023-10-29 PROCEDURE — 90694 VACC AIIV4 NO PRSRV 0.5ML IM: CPT | Mod: S$GLB,,, | Performed by: NURSE PRACTITIONER

## 2023-10-29 PROCEDURE — 90694 FLU VACCINE - QUADRIVALENT - ADJUVANTED: ICD-10-PCS | Mod: S$GLB,,, | Performed by: NURSE PRACTITIONER

## 2023-10-29 NOTE — PROGRESS NOTES
"Subjective:      Patient ID: Dain Gastelum Jr. is a 68 y.o. male.    Vitals:  height is 5' 11" (1.803 m) and weight is 99.8 kg (220 lb). His temporal temperature is 97.3 °F (36.3 °C). His blood pressure is 127/82 and his pulse is 72. His respiration is 16 and oxygen saturation is 96%.     Chief Complaint: Flu Vaccine    Pt is a 68 y.o. male wanting to receive the flu vaccine.        Constitution: Negative.   HENT: Negative.     Neck: neck negative.   Cardiovascular: Negative.    Respiratory: Negative.        Objective:     Physical Exam   HENT:   Head: Normocephalic.   Pulmonary/Chest: Effort normal.   Abdominal: Normal appearance.   Neurological: He is alert.       Assessment:     1. Encounter for vaccination        Plan:     Mr. Gastelum presents for Flu vaccine.   Encounter for vaccination  -     Influenza (FLUAD) - Quadrivalent (Adjuvanted) *Preferred* (65+) (PF)                    "

## 2023-11-06 ENCOUNTER — OFFICE VISIT (OUTPATIENT)
Dept: CARDIOLOGY | Facility: CLINIC | Age: 68
End: 2023-11-06
Payer: MEDICARE

## 2023-11-06 ENCOUNTER — HOSPITAL ENCOUNTER (OUTPATIENT)
Dept: CARDIOLOGY | Facility: CLINIC | Age: 68
Discharge: HOME | End: 2023-11-06
Payer: MEDICARE

## 2023-11-06 VITALS
HEIGHT: 71 IN | BODY MASS INDEX: 31.42 KG/M2 | WEIGHT: 224.44 LBS | OXYGEN SATURATION: 96 % | HEART RATE: 64 BPM | DIASTOLIC BLOOD PRESSURE: 72 MMHG | SYSTOLIC BLOOD PRESSURE: 155 MMHG

## 2023-11-06 DIAGNOSIS — I10 ESSENTIAL HYPERTENSION: Chronic | ICD-10-CM

## 2023-11-06 DIAGNOSIS — I25.10 CORONARY ARTERY CALCIFICATION SEEN ON CAT SCAN: Primary | Chronic | ICD-10-CM

## 2023-11-06 DIAGNOSIS — E78.00 PURE HYPERCHOLESTEROLEMIA: ICD-10-CM

## 2023-11-06 DIAGNOSIS — R00.2 HEART PALPITATIONS: ICD-10-CM

## 2023-11-06 DIAGNOSIS — Z01.818 PRE-OPERATIVE CLEARANCE: Chronic | ICD-10-CM

## 2023-11-06 PROCEDURE — 1157F PR ADVANCE CARE PLAN OR EQUIV PRESENT IN MEDICAL RECORD: ICD-10-PCS | Mod: CPTII,S$GLB,, | Performed by: INTERNAL MEDICINE

## 2023-11-06 PROCEDURE — 93000 ELECTROCARDIOGRAM COMPLETE: CPT | Mod: S$GLB,,, | Performed by: INTERNAL MEDICINE

## 2023-11-06 PROCEDURE — 1125F PR PAIN SEVERITY QUANTIFIED, PAIN PRESENT: ICD-10-PCS | Mod: CPTII,S$GLB,, | Performed by: INTERNAL MEDICINE

## 2023-11-06 PROCEDURE — 3288F FALL RISK ASSESSMENT DOCD: CPT | Mod: CPTII,S$GLB,, | Performed by: INTERNAL MEDICINE

## 2023-11-06 PROCEDURE — 1157F ADVNC CARE PLAN IN RCRD: CPT | Mod: CPTII,S$GLB,, | Performed by: INTERNAL MEDICINE

## 2023-11-06 PROCEDURE — 93000 EKG 12-LEAD: ICD-10-PCS | Mod: S$GLB,,, | Performed by: INTERNAL MEDICINE

## 2023-11-06 PROCEDURE — 3008F PR BODY MASS INDEX (BMI) DOCUMENTED: ICD-10-PCS | Mod: CPTII,S$GLB,, | Performed by: INTERNAL MEDICINE

## 2023-11-06 PROCEDURE — 1159F MED LIST DOCD IN RCRD: CPT | Mod: CPTII,S$GLB,, | Performed by: INTERNAL MEDICINE

## 2023-11-06 PROCEDURE — 99999 PR PBB SHADOW E&M-EST. PATIENT-LVL IV: ICD-10-PCS | Mod: PBBFAC,,, | Performed by: INTERNAL MEDICINE

## 2023-11-06 PROCEDURE — 1101F PR PT FALLS ASSESS DOC 0-1 FALLS W/OUT INJ PAST YR: ICD-10-PCS | Mod: CPTII,S$GLB,, | Performed by: INTERNAL MEDICINE

## 2023-11-06 PROCEDURE — 3078F PR MOST RECENT DIASTOLIC BLOOD PRESSURE < 80 MM HG: ICD-10-PCS | Mod: CPTII,S$GLB,, | Performed by: INTERNAL MEDICINE

## 2023-11-06 PROCEDURE — 1125F AMNT PAIN NOTED PAIN PRSNT: CPT | Mod: CPTII,S$GLB,, | Performed by: INTERNAL MEDICINE

## 2023-11-06 PROCEDURE — 3077F SYST BP >= 140 MM HG: CPT | Mod: CPTII,S$GLB,, | Performed by: INTERNAL MEDICINE

## 2023-11-06 PROCEDURE — 4010F PR ACE/ARB THEARPY RXD/TAKEN: ICD-10-PCS | Mod: CPTII,S$GLB,, | Performed by: INTERNAL MEDICINE

## 2023-11-06 PROCEDURE — 99214 OFFICE O/P EST MOD 30 MIN: CPT | Mod: S$GLB,,, | Performed by: INTERNAL MEDICINE

## 2023-11-06 PROCEDURE — 3044F HG A1C LEVEL LT 7.0%: CPT | Mod: CPTII,S$GLB,, | Performed by: INTERNAL MEDICINE

## 2023-11-06 PROCEDURE — 1159F PR MEDICATION LIST DOCUMENTED IN MEDICAL RECORD: ICD-10-PCS | Mod: CPTII,S$GLB,, | Performed by: INTERNAL MEDICINE

## 2023-11-06 PROCEDURE — 99214 PR OFFICE/OUTPT VISIT, EST, LEVL IV, 30-39 MIN: ICD-10-PCS | Mod: S$GLB,,, | Performed by: INTERNAL MEDICINE

## 2023-11-06 PROCEDURE — 3008F BODY MASS INDEX DOCD: CPT | Mod: CPTII,S$GLB,, | Performed by: INTERNAL MEDICINE

## 2023-11-06 PROCEDURE — 99999 PR PBB SHADOW E&M-EST. PATIENT-LVL IV: CPT | Mod: PBBFAC,,, | Performed by: INTERNAL MEDICINE

## 2023-11-06 PROCEDURE — 3078F DIAST BP <80 MM HG: CPT | Mod: CPTII,S$GLB,, | Performed by: INTERNAL MEDICINE

## 2023-11-06 PROCEDURE — 4010F ACE/ARB THERAPY RXD/TAKEN: CPT | Mod: CPTII,S$GLB,, | Performed by: INTERNAL MEDICINE

## 2023-11-06 PROCEDURE — 3044F PR MOST RECENT HEMOGLOBIN A1C LEVEL <7.0%: ICD-10-PCS | Mod: CPTII,S$GLB,, | Performed by: INTERNAL MEDICINE

## 2023-11-06 PROCEDURE — 3288F PR FALLS RISK ASSESSMENT DOCUMENTED: ICD-10-PCS | Mod: CPTII,S$GLB,, | Performed by: INTERNAL MEDICINE

## 2023-11-06 PROCEDURE — 1101F PT FALLS ASSESS-DOCD LE1/YR: CPT | Mod: CPTII,S$GLB,, | Performed by: INTERNAL MEDICINE

## 2023-11-06 PROCEDURE — 3077F PR MOST RECENT SYSTOLIC BLOOD PRESSURE >= 140 MM HG: ICD-10-PCS | Mod: CPTII,S$GLB,, | Performed by: INTERNAL MEDICINE

## 2023-11-06 RX ORDER — PRAVASTATIN SODIUM 40 MG/1
TABLET ORAL
COMMUNITY
Start: 2023-06-16 | End: 2023-11-06

## 2023-11-06 NOTE — PROGRESS NOTES
"Subjective:   Patient ID:  Dani Gastelum Jr. is a 68 y.o. male who presents for follow-up of Pre-op Exam  Very pleasant man referred to this clinic for evaluation of his coronary artery disease.  In 2016, he saw Dr. Tanner who ordered a calcium scoring CT and that score was 374.  He had a vasodilator stress test with SPECT imaging subsequently and it was free of evidence of obstructive disease.  He has continued on his medical regimen including pravastatin 40mg daily since then.  A repeat calcium scoring CT was repeated for unclear reasons and it was 645.     He denies CASTANEDA, palpitations, PND/orthopnea, lightheadedness and syncope.  He works out at the gym regularly and feels well doing his workouts almost every day.     He says his home BPs run 140s/70s-80s.    HPI:   Preop clearance for knee surgery-arthritis TOTAL KNEE REPLACEMENT ROBOTIC ASSISTED,LEFT  can easily do 4 METS  Lifts weights and does elliptical  No chest pain, Orthopnea, PND of heart failure symptoms.   Denies palpitations or fluttering in the chest  BP at home is normal and says its high due to knee pain    EKG: NSR, non specific ST-T changes. No difference from prior.     Patient Active Problem List   Diagnosis    Chronic rhinitis    Hyperlipidemia    Vertigo, intermittent    Joint pain    Localized primary osteoarthritis of lower leg    Medial meniscus tear    Essential hypertension    S/P knee surgery, medial menisectomy    S/P TURP    DDD (degenerative disc disease), thoracic    Primary localized osteoarthrosis, lower leg    Prostate cancer    Heart palpitations    Thalassemia, beta    Coronary artery calcification seen on CAT scan    Neck pain    Low back pain    Cervical spondylosis with radiculopathy    Papillary thyroid carcinoma    Postoperative hypothyroidism    Tinnitus of right ear    Glaucoma    History of prostate cancer     BP (!) 155/72   Pulse 64   Ht 5' 11" (1.803 m)   Wt 101.8 kg (224 lb 6.9 oz)   SpO2 96%   BMI 31.30 " kg/m²   Body mass index is 31.3 kg/m².  CrCl cannot be calculated (Patient's most recent lab result is older than the maximum 7 days allowed.).    Lab Results   Component Value Date     01/30/2023    K 4.1 01/30/2023     01/30/2023    CO2 21 (L) 01/30/2023    BUN 15 01/30/2023    CREATININE 1.2 01/30/2023     01/30/2023    HGBA1C 5.4 02/10/2023    AST 16 01/30/2023    ALT 19 01/30/2023    ALBUMIN 3.6 01/30/2023    PROT 7.0 01/30/2023    BILITOT 0.6 01/30/2023    WBC 5.40 01/30/2023    HGB 11.6 (A) 07/26/2023    HGB 11.9 (L) 01/30/2023    HCT 39.2 (L) 01/30/2023    MCV 78 (L) 01/30/2023     01/30/2023    INR 1.1 04/20/2022    INR 1.0 06/05/2013    PSA 0.61 02/22/2019    TSH 0.115 (L) 08/24/2023    CHOL 140 01/30/2023    HDL 34 (L) 01/30/2023    LDLCALC 95.2 01/30/2023    TRIG 54 01/30/2023       Current Outpatient Medications   Medication Sig    amLODIPine (NORVASC) 5 MG tablet Take 1 tablet (5 mg total) by mouth once daily.    co-enzyme Q-10 30 mg capsule Take 30 mg by mouth once daily.    dorzolamide-timolol 2-0.5% (COSOPT) 22.3-6.8 mg/mL ophthalmic solution INSTILL 1 DROP IN BOTH EYES TWICE DAILY AS DIRECTED    fish oil-omega-3 fatty acids 300-1,000 mg capsule Take 2 g by mouth once daily.    FOLIC ACID/MV,FE,OTHER MIN (CENTRUM ORAL) Take 1 tablet by mouth once daily.    glucosamine-chondroitin 500-400 mg tablet Take 1 tablet by mouth 3 (three) times daily. Pt taking one pill once a day.    latanoprost 0.005 % ophthalmic solution INT 1 GTT IN OU QD HS    levothyroxine (SYNTHROID) 137 MCG Tab tablet Take 1 tablet (137 mcg total) by mouth before breakfast.    losartan (COZAAR) 100 MG tablet Take 1 tablet (100 mg total) by mouth once daily.    rosuvastatin (CRESTOR) 40 MG Tab Take 1 tablet (40 mg total) by mouth every evening.    VOLTAREN 1 % Gel as needed.    aspirin 81 MG Chew Take 81 mg by mouth once daily.    ondansetron (ZOFRAN-ODT) 4 MG TbDL Take 4 mg by mouth once.     No current  facility-administered medications for this visit.       Review of Systems   Constitutional: Negative for chills, decreased appetite, malaise/fatigue, night sweats, weight gain and weight loss.   Eyes:  Negative for blurred vision, double vision, visual disturbance and visual halos.   Cardiovascular:  Negative for chest pain, claudication, cyanosis, dyspnea on exertion, irregular heartbeat, leg swelling, near-syncope, orthopnea, palpitations, paroxysmal nocturnal dyspnea and syncope.   Respiratory:  Negative for cough, hemoptysis, snoring, sputum production and wheezing.    Endocrine: Negative for cold intolerance, heat intolerance, polydipsia and polyphagia.   Hematologic/Lymphatic: Negative for adenopathy and bleeding problem. Does not bruise/bleed easily.   Skin:  Negative for flushing, itching, poor wound healing and rash.   Musculoskeletal:  Positive for arthritis. Negative for back pain, falls, gout, joint pain, joint swelling, muscle cramps, muscle weakness, myalgias, neck pain and stiffness.   Gastrointestinal:  Negative for bloating, abdominal pain, anorexia, diarrhea, dysphagia, excessive appetite, flatus, hematemesis, jaundice, melena and nausea.   Genitourinary:  Negative for hesitancy and incomplete emptying.   Neurological:  Negative for aphonia, brief paralysis, difficulty with concentration, disturbances in coordination, excessive daytime sleepiness, dizziness, focal weakness, light-headedness, loss of balance and weakness.   Psychiatric/Behavioral:  Negative for altered mental status, depression, hallucinations, hypervigilance, memory loss, substance abuse and suicidal ideas. The patient does not have insomnia and is not nervous/anxious.        Objective:   Physical Exam  Constitutional:       General: He is not in acute distress.     Appearance: He is well-developed. He is not diaphoretic.   HENT:      Head: Normocephalic and atraumatic.      Nose: Nose normal.      Mouth/Throat:      Pharynx: No  oropharyngeal exudate.   Eyes:      General: No scleral icterus.        Right eye: No discharge.         Left eye: No discharge.      Conjunctiva/sclera: Conjunctivae normal.      Pupils: Pupils are equal, round, and reactive to light.   Neck:      Thyroid: No thyromegaly.      Vascular: No JVD.      Trachea: No tracheal deviation.   Cardiovascular:      Rate and Rhythm: Normal rate and regular rhythm.      Pulses: Intact distal pulses.      Heart sounds: Normal heart sounds. No murmur heard.     No friction rub. No gallop.   Pulmonary:      Effort: Pulmonary effort is normal. No respiratory distress.      Breath sounds: Normal breath sounds. No stridor. No wheezing or rales.   Chest:      Chest wall: No tenderness.   Abdominal:      General: Bowel sounds are normal. There is no distension.      Palpations: Abdomen is soft. There is no mass.      Tenderness: There is no abdominal tenderness.   Musculoskeletal:         General: No tenderness.      Cervical back: Normal range of motion and neck supple.   Lymphadenopathy:      Cervical: No cervical adenopathy.   Skin:     General: Skin is warm.      Coloration: Skin is not pale.      Findings: No erythema or rash.   Neurological:      Mental Status: He is alert and oriented to person, place, and time.      Cranial Nerves: No cranial nerve deficit.      Motor: No abnormal muscle tone.      Coordination: Coordination normal.      Deep Tendon Reflexes: Reflexes are normal and symmetric. Reflexes normal.   Psychiatric:         Behavior: Behavior normal.         Thought Content: Thought content normal.         Judgment: Judgment normal.         Assessment:     1. Coronary artery calcification seen on CAT scan    2. Essential hypertension    3. Heart palpitations    4. Pure hypercholesterolemia    5. Pre-operative clearance        Plan:   Dani was seen today for pre-op exam.    Diagnoses and all orders for this visit:    Coronary artery calcification seen on CAT  scan    Essential hypertension  -     Lipid Panel; Future    Heart palpitations    Pure hypercholesterolemia  -     Lipid Panel; Future    Pre-operative clearance    Doing well. Will repeat lipids. Low threshold for adding zetia.   RCRI score of 1 with perioperative morbidity and mortality of 1.3%. Patient is low risk and can proceed with non cardiac surgery.     Counseled on importance of heart healthy diet low in saturated and trans fat and salt as well gradually starting a regular aerobic exercise regimen with goal of 30min 5x/week. Recommend BP diary. Call if systolic BP > 130 mmHg on checking repeatedly    RTC with Dr. Lorenzo 4 months

## 2023-11-07 ENCOUNTER — PATIENT MESSAGE (OUTPATIENT)
Dept: CARDIOLOGY | Facility: CLINIC | Age: 68
End: 2023-11-07
Payer: MEDICARE

## 2024-01-04 ENCOUNTER — LAB VISIT (OUTPATIENT)
Dept: LAB | Facility: HOSPITAL | Age: 69
End: 2024-01-04
Payer: MEDICARE

## 2024-01-04 DIAGNOSIS — E78.49 OTHER HYPERLIPIDEMIA: ICD-10-CM

## 2024-01-04 DIAGNOSIS — I10 ESSENTIAL HYPERTENSION: ICD-10-CM

## 2024-01-04 LAB
ALBUMIN SERPL BCP-MCNC: 3.8 G/DL (ref 3.5–5.2)
ALP SERPL-CCNC: 74 U/L (ref 55–135)
ALT SERPL W/O P-5'-P-CCNC: 28 U/L (ref 10–44)
ANION GAP SERPL CALC-SCNC: 8 MMOL/L (ref 8–16)
AST SERPL-CCNC: 20 U/L (ref 10–40)
BASOPHILS # BLD AUTO: 0.04 K/UL (ref 0–0.2)
BASOPHILS NFR BLD: 0.6 % (ref 0–1.9)
BILIRUB SERPL-MCNC: 0.6 MG/DL (ref 0.1–1)
BUN SERPL-MCNC: 17 MG/DL (ref 8–23)
CALCIUM SERPL-MCNC: 9.3 MG/DL (ref 8.7–10.5)
CHLORIDE SERPL-SCNC: 105 MMOL/L (ref 95–110)
CHOLEST SERPL-MCNC: 127 MG/DL (ref 120–199)
CHOLEST/HDLC SERPL: 3.3 {RATIO} (ref 2–5)
CO2 SERPL-SCNC: 29 MMOL/L (ref 23–29)
CREAT SERPL-MCNC: 1.2 MG/DL (ref 0.5–1.4)
DIFFERENTIAL METHOD BLD: ABNORMAL
EOSINOPHIL # BLD AUTO: 0.7 K/UL (ref 0–0.5)
EOSINOPHIL NFR BLD: 10.1 % (ref 0–8)
ERYTHROCYTE [DISTWIDTH] IN BLOOD BY AUTOMATED COUNT: 15 % (ref 11.5–14.5)
EST. GFR  (NO RACE VARIABLE): >60 ML/MIN/1.73 M^2
GLUCOSE SERPL-MCNC: 114 MG/DL (ref 70–110)
HCT VFR BLD AUTO: 39.3 % (ref 40–54)
HDLC SERPL-MCNC: 38 MG/DL (ref 40–75)
HDLC SERPL: 29.9 % (ref 20–50)
HGB BLD-MCNC: 12.3 G/DL (ref 14–18)
IMM GRANULOCYTES # BLD AUTO: 0.01 K/UL (ref 0–0.04)
IMM GRANULOCYTES NFR BLD AUTO: 0.1 % (ref 0–0.5)
LDLC SERPL CALC-MCNC: 78.6 MG/DL (ref 63–159)
LYMPHOCYTES # BLD AUTO: 3.6 K/UL (ref 1–4.8)
LYMPHOCYTES NFR BLD: 51.3 % (ref 18–48)
MCH RBC QN AUTO: 24.3 PG (ref 27–31)
MCHC RBC AUTO-ENTMCNC: 31.3 G/DL (ref 32–36)
MCV RBC AUTO: 78 FL (ref 82–98)
MONOCYTES # BLD AUTO: 0.6 K/UL (ref 0.3–1)
MONOCYTES NFR BLD: 8.1 % (ref 4–15)
NEUTROPHILS # BLD AUTO: 2.1 K/UL (ref 1.8–7.7)
NEUTROPHILS NFR BLD: 29.8 % (ref 38–73)
NONHDLC SERPL-MCNC: 89 MG/DL
NRBC BLD-RTO: 0 /100 WBC
PLATELET # BLD AUTO: 287 K/UL (ref 150–450)
PMV BLD AUTO: 10 FL (ref 9.2–12.9)
POTASSIUM SERPL-SCNC: 4.4 MMOL/L (ref 3.5–5.1)
PROT SERPL-MCNC: 7.4 G/DL (ref 6–8.4)
RBC # BLD AUTO: 5.07 M/UL (ref 4.6–6.2)
SODIUM SERPL-SCNC: 142 MMOL/L (ref 136–145)
TRIGL SERPL-MCNC: 52 MG/DL (ref 30–150)
WBC # BLD AUTO: 7.04 K/UL (ref 3.9–12.7)

## 2024-01-04 PROCEDURE — 80061 LIPID PANEL: CPT | Performed by: INTERNAL MEDICINE

## 2024-01-04 PROCEDURE — 85025 COMPLETE CBC W/AUTO DIFF WBC: CPT | Performed by: INTERNAL MEDICINE

## 2024-01-04 PROCEDURE — 36415 COLL VENOUS BLD VENIPUNCTURE: CPT | Mod: PN | Performed by: INTERNAL MEDICINE

## 2024-01-04 PROCEDURE — 80053 COMPREHEN METABOLIC PANEL: CPT | Performed by: INTERNAL MEDICINE

## 2024-01-07 PROCEDURE — 93010 ELECTROCARDIOGRAM REPORT: CPT | Mod: ,,, | Performed by: INTERNAL MEDICINE

## 2024-01-07 PROCEDURE — 99900041 HC LEFT WITHOUT BEING SEEN- EMERGENCY

## 2024-01-07 PROCEDURE — 93005 ELECTROCARDIOGRAM TRACING: CPT

## 2024-01-08 ENCOUNTER — HOSPITAL ENCOUNTER (EMERGENCY)
Facility: OTHER | Age: 69
Discharge: LEFT BEFORE DISCHARGE | End: 2024-01-08
Payer: MEDICARE

## 2024-01-08 VITALS
DIASTOLIC BLOOD PRESSURE: 72 MMHG | HEART RATE: 71 BPM | RESPIRATION RATE: 18 BRPM | SYSTOLIC BLOOD PRESSURE: 125 MMHG | TEMPERATURE: 98 F | OXYGEN SATURATION: 96 %

## 2024-01-08 DIAGNOSIS — R07.9 CHEST PAIN: ICD-10-CM

## 2024-01-11 ENCOUNTER — OFFICE VISIT (OUTPATIENT)
Dept: INTERNAL MEDICINE | Facility: CLINIC | Age: 69
End: 2024-01-11
Payer: MEDICARE

## 2024-01-11 VITALS
OXYGEN SATURATION: 97 % | SYSTOLIC BLOOD PRESSURE: 128 MMHG | TEMPERATURE: 98 F | DIASTOLIC BLOOD PRESSURE: 76 MMHG | HEIGHT: 71 IN | HEART RATE: 71 BPM | BODY MASS INDEX: 30.4 KG/M2 | RESPIRATION RATE: 16 BRPM | WEIGHT: 217.13 LBS

## 2024-01-11 DIAGNOSIS — Z12.5 ENCOUNTER FOR PROSTATE CANCER SCREENING: ICD-10-CM

## 2024-01-11 DIAGNOSIS — I10 ESSENTIAL HYPERTENSION: Primary | ICD-10-CM

## 2024-01-11 DIAGNOSIS — D56.1 BETA THALASSEMIA: ICD-10-CM

## 2024-01-11 DIAGNOSIS — R07.9 CHEST PAIN, UNSPECIFIED TYPE: ICD-10-CM

## 2024-01-11 DIAGNOSIS — I25.10 CORONARY ARTERY CALCIFICATION SEEN ON CAT SCAN: ICD-10-CM

## 2024-01-11 DIAGNOSIS — E78.49 OTHER HYPERLIPIDEMIA: ICD-10-CM

## 2024-01-11 DIAGNOSIS — R94.31 ABNORMAL EKG: ICD-10-CM

## 2024-01-11 DIAGNOSIS — E89.0 POSTOPERATIVE HYPOTHYROIDISM: ICD-10-CM

## 2024-01-11 PROCEDURE — 99999 PR PBB SHADOW E&M-EST. PATIENT-LVL V: CPT | Mod: PBBFAC,,, | Performed by: INTERNAL MEDICINE

## 2024-01-11 PROCEDURE — 99214 OFFICE O/P EST MOD 30 MIN: CPT | Mod: S$GLB,,, | Performed by: INTERNAL MEDICINE

## 2024-01-12 NOTE — PROGRESS NOTES
Subjective:       Patient ID: Dani Gastelum Jr. is a 68 y.o. male.    Chief Complaint: Follow-up, Hypertension, and Hyperlipidemia    HPI  The patient presents for follow-up of medical conditions which include hypertension hyperlipidemia, osteoarthritis, beta thalassemia, and recent episode of chest pain.  The patient is status post ER visit [facility] for recurrent substernal chest pain which took place over a 3 hour period.  He denied experiencing any associated shortness of breath.  His symptoms resolved prior to his presentation for his ER visit.  He states an EKG was obtained.  He was subsequently released.  No blood testing was performed.  Since his ER visit he has not experienced any chest discomfort or shortness of breath.  He does have history of coronary artery calcifications on CT scan.  He does not have a personal history of history of known obstructive coronary artery disease.  He has been followed by his cardiologist.  We discussed obtaining a follow-up evaluation in view of his recent symptoms.    The patient has hypertension.  He states his blood pressures ranged from 130-140 systolic at home.  Diastolic readings have been normal.  The patient's office blood pressure readings have usually been less than 130/80.  The patient denies experiencing any headaches, dizziness, PND, orthopnea, or ankle edema.    The patient is status post left total knee replacement on 12/04/2023 at [facility] performed by Dr. Herrera.  He is completing physical therapy this week.  He thinks he has made good progress.  He is not experiencing any severe pain at the present time.  Mild postop swelling is still noted at the joint site.  The patient had right total knee replacement surgery performed on 05/02/2022 and had recovered well from that surgery.    The patient is followed by endocrinology for postop hypothyroidism following thyroidectomy surgery for papillary thyroid carcinoma.  His endocrinologist is managing his a  thyroid replacement therapy.    Patient had history of prostate cancer in 2019 diagnosed the time of a TURP for BPH.  Two chips of tissue were positive for [place] 6 prostate adenocarcinoma. He has been followed by his urologist (Vini Sandy).    Immunization record was reviewed.    Screening tests were reviewed.    Review of Systems   Constitutional:  Positive for fatigue. Negative for activity change, appetite change, chills, fever and unexpected weight change.   HENT:  Negative for nasal congestion, ear pain, nosebleeds and postnasal drip.    Eyes:  Negative for pain, redness, itching and visual disturbance.   Respiratory:  Negative for cough, chest tightness, shortness of breath and wheezing.    Cardiovascular:  Positive for chest pain. Negative for palpitations and leg swelling.   Gastrointestinal:  Negative for abdominal pain, blood in stool, constipation, nausea and vomiting.   Genitourinary:  Negative for difficulty urinating, dysuria, frequency, hematuria and urgency.   Musculoskeletal:  Positive for arthralgias. Negative for back pain, gait problem, joint swelling, myalgias, neck pain and neck stiffness.   Integumentary:  Negative for color change and rash.   Neurological:  Negative for dizziness, seizures, syncope, weakness, light-headedness, numbness and headaches.   Hematological:  Does not bruise/bleed easily.   Psychiatric/Behavioral:  Negative for agitation, confusion, hallucinations and sleep disturbance. The patient is not nervous/anxious.             Physical Exam  Vitals and nursing note reviewed.   Constitutional:       General: He is not in acute distress.     Appearance: Normal appearance. He is well-developed.   HENT:      Head: Normocephalic and atraumatic.      Right Ear: External ear normal.      Left Ear: External ear normal.      Mouth/Throat:      Pharynx: Oropharynx is clear. No oropharyngeal exudate.   Eyes:      General: No scleral icterus.     Extraocular Movements: Extraocular  movements intact.      Conjunctiva/sclera: Conjunctivae normal.   Neck:      Thyroid: No thyromegaly.      Vascular: No JVD.   Cardiovascular:      Rate and Rhythm: Normal rate and regular rhythm.      Pulses: Normal pulses.      Heart sounds: Normal heart sounds. No murmur heard.     No friction rub. No gallop.   Pulmonary:      Effort: Pulmonary effort is normal. No respiratory distress.      Breath sounds: Normal breath sounds. No wheezing or rales.   Abdominal:      General: Bowel sounds are normal. There is no distension.      Palpations: Abdomen is soft. There is no mass.      Tenderness: There is no abdominal tenderness. There is no guarding.   Musculoskeletal:         General: Swelling present. No tenderness. Normal range of motion.      Cervical back: Normal range of motion and neck supple.      Right lower leg: No edema.      Left lower leg: No edema.      Comments: Left knee:  Mild postop swelling is present.  No local tenderness is noted.  Range of motion is good.    Right knee:  No swelling is present.  Good range of motion is present.  No local tenderness is present.   Lymphadenopathy:      Cervical: No cervical adenopathy.   Skin:     General: Skin is warm and dry.      Findings: No rash.   Neurological:      General: No focal deficit present.      Mental Status: He is alert and oriented to person, place, and time.      Cranial Nerves: No cranial nerve deficit.      Motor: No abnormal muscle tone.      Deep Tendon Reflexes: Reflexes are normal and symmetric.   Psychiatric:         Behavior: Behavior normal.         Thought Content: Thought content normal.           Lab Visit on 01/04/2024   Component Date Value Ref Range Status    Sodium 01/04/2024 142  136 - 145 mmol/L Final    Potassium 01/04/2024 4.4  3.5 - 5.1 mmol/L Final    Chloride 01/04/2024 105  95 - 110 mmol/L Final    CO2 01/04/2024 29  23 - 29 mmol/L Final    Glucose 01/04/2024 114 (H)  70 - 110 mg/dL Final    BUN 01/04/2024 17  8 - 23  mg/dL Final    Creatinine 01/04/2024 1.2  0.5 - 1.4 mg/dL Final    Calcium 01/04/2024 9.3  8.7 - 10.5 mg/dL Final    Total Protein 01/04/2024 7.4  6.0 - 8.4 g/dL Final    Albumin 01/04/2024 3.8  3.5 - 5.2 g/dL Final    Total Bilirubin 01/04/2024 0.6  0.1 - 1.0 mg/dL Final    Comment: For infants and newborns, interpretation of results should be based  on gestational age, weight and in agreement with clinical  observations.    Premature Infant recommended reference ranges:  Up to 24 hours.............<8.0 mg/dL  Up to 48 hours............<12.0 mg/dL  3-5 days..................<15.0 mg/dL  6-29 days.................<15.0 mg/dL      Alkaline Phosphatase 01/04/2024 74  55 - 135 U/L Final    AST 01/04/2024 20  10 - 40 U/L Final    ALT 01/04/2024 28  10 - 44 U/L Final    eGFR 01/04/2024 >60.0  >60 mL/min/1.73 m^2 Final    Anion Gap 01/04/2024 8  8 - 16 mmol/L Final    Cholesterol 01/04/2024 127  120 - 199 mg/dL Final    Comment: The National Cholesterol Education Program (NCEP) has set the  following guidelines (reference ranges) for Cholesterol:  Optimal.....................<200 mg/dL  Borderline High.............200-239 mg/dL  High........................> or = 240 mg/dL      Triglycerides 01/04/2024 52  30 - 150 mg/dL Final    Comment: The National Cholesterol Education Program (NCEP) has set the  following guidelines (reference values) for triglycerides:  Normal......................<150 mg/dL  Borderline High.............150-199 mg/dL  High........................200-499 mg/dL      HDL 01/04/2024 38 (L)  40 - 75 mg/dL Final    Comment: The National Cholesterol Education Program (NCEP) has set the  following guidelines (reference values) for HDL Cholesterol:  Low...............<40 mg/dL  Optimal...........>60 mg/dL      LDL Cholesterol 01/04/2024 78.6  63.0 - 159.0 mg/dL Final    Comment: The National Cholesterol Education Program (NCEP) has set the  following guidelines (reference values) for LDL  Cholesterol:  Optimal.......................<130 mg/dL  Borderline High...............130-159 mg/dL  High..........................160-189 mg/dL  Very High.....................>190 mg/dL      HDL/Cholesterol Ratio 01/04/2024 29.9  20.0 - 50.0 % Final    Total Cholesterol/HDL Ratio 01/04/2024 3.3  2.0 - 5.0 Final    Non-HDL Cholesterol 01/04/2024 89  mg/dL Final    Comment: Risk category and Non-HDL cholesterol goals:  Coronary heart disease (CHD)or equivalent (10-year risk of CHD >20%):  Non-HDL cholesterol goal     <130 mg/dL  Two or more CHD risk factors and 10-year risk of CHD <= 20%:  Non-HDL cholesterol goal     <160 mg/dL  0 to 1 CHD risk factor:  Non-HDL cholesterol goal     <190 mg/dL      WBC 01/04/2024 7.04  3.90 - 12.70 K/uL Final    RBC 01/04/2024 5.07  4.60 - 6.20 M/uL Final    Hemoglobin 01/04/2024 12.3 (L)  14.0 - 18.0 g/dL Final    Hematocrit 01/04/2024 39.3 (L)  40.0 - 54.0 % Final    MCV 01/04/2024 78 (L)  82 - 98 fL Final    MCH 01/04/2024 24.3 (L)  27.0 - 31.0 pg Final    MCHC 01/04/2024 31.3 (L)  32.0 - 36.0 g/dL Final    RDW 01/04/2024 15.0 (H)  11.5 - 14.5 % Final    Platelets 01/04/2024 287  150 - 450 K/uL Final    MPV 01/04/2024 10.0  9.2 - 12.9 fL Final    Immature Granulocytes 01/04/2024 0.1  0.0 - 0.5 % Final    Gran # (ANC) 01/04/2024 2.1  1.8 - 7.7 K/uL Final    Immature Grans (Abs) 01/04/2024 0.01  0.00 - 0.04 K/uL Final    Comment: Mild elevation in immature granulocytes is non specific and   can be seen in a variety of conditions including stress response,   acute inflammation, trauma and pregnancy. Correlation with other   laboratory and clinical findings is essential.      Lymph # 01/04/2024 3.6  1.0 - 4.8 K/uL Final    Mono # 01/04/2024 0.6  0.3 - 1.0 K/uL Final    Eos # 01/04/2024 0.7 (H)  0.0 - 0.5 K/uL Final    Baso # 01/04/2024 0.04  0.00 - 0.20 K/uL Final    nRBC 01/04/2024 0  0 /100 WBC Final    Gran % 01/04/2024 29.8 (L)  38.0 - 73.0 % Final    Lymph % 01/04/2024 51.3 (H)   18.0 - 48.0 % Final    Mono % 01/04/2024 8.1  4.0 - 15.0 % Final    Eosinophil % 01/04/2024 10.1 (H)  0.0 - 8.0 % Final    Basophil % 01/04/2024 0.6  0.0 - 1.9 % Final    Differential Method 01/04/2024 Automated   Final     Results for orders placed or performed during the hospital encounter of 01/08/24   EKG 12-lead    Collection Time: 01/07/24 11:48 PM    Narrative    Test Reason : R07.9,    Vent. Rate : 070 BPM     Atrial Rate : 070 BPM     P-R Int : 130 ms          QRS Dur : 084 ms      QT Int : 368 ms       P-R-T Axes : 072 034 -04 degrees     QTc Int : 397 ms    Normal sinus rhythm  T wave abnormality, consider inferior ischemia  Abnormal ECG    Confirmed by Radha Hinkle MD ([id number]) on 1/8/2024 8:15:39 AM    Referred By:             Confirmed By:Radha Hinkle MD       Assessment & Plan:      Dani was seen today for follow-up, hypertension and hyperlipidemia.  The patient will be referred to his cardiologist for further evaluation in view of recent episode of chest pain.  The patient is currently asymptomatic.    PSA level will be obtained next month as requested.    The patient will follow-up with Endocrinology regarding his thyroid replacement therapy.    Current medication will be continued for treatment of hypertension and hyperlipidemia.    Diagnoses and all orders for this visit:    Essential hypertension    Beta thalassemia    Other hyperlipidemia    Coronary artery calcification seen on CAT scan    Postoperative hypothyroidism    Chest pain, unspecified type  -     Ambulatory referral/consult to Cardiology; Future    Abnormal EKG  -     Ambulatory referral/consult to Cardiology; Future    Encounter for prostate cancer screening  -     PSA, Screening; Future         Follow up in about 6 months (around 7/11/2024).     Frandy Hill MD

## 2024-01-15 PROBLEM — I25.10 CORONARY ARTERY CALCIFICATION SEEN ON CAT SCAN: Chronic | Status: RESOLVED | Noted: 2017-03-13 | Resolved: 2024-01-15

## 2024-01-15 PROBLEM — E89.0 POSTOPERATIVE HYPOTHYROIDISM: Status: RESOLVED | Noted: 2020-11-20 | Resolved: 2024-01-15

## 2024-01-15 PROBLEM — Z85.46 HISTORY OF PROSTATE CANCER: Status: RESOLVED | Noted: 2019-07-26 | Resolved: 2024-01-15

## 2024-02-02 ENCOUNTER — LAB VISIT (OUTPATIENT)
Dept: LAB | Facility: HOSPITAL | Age: 69
End: 2024-02-02
Attending: INTERNAL MEDICINE
Payer: MEDICARE

## 2024-02-02 DIAGNOSIS — Z12.5 ENCOUNTER FOR PROSTATE CANCER SCREENING: ICD-10-CM

## 2024-02-02 LAB — COMPLEXED PSA SERPL-MCNC: 0.91 NG/ML (ref 0–4)

## 2024-02-02 PROCEDURE — 84153 ASSAY OF PSA TOTAL: CPT | Performed by: INTERNAL MEDICINE

## 2024-02-02 PROCEDURE — 36415 COLL VENOUS BLD VENIPUNCTURE: CPT | Mod: PN | Performed by: INTERNAL MEDICINE

## 2024-02-21 NOTE — TELEPHONE ENCOUNTER
----- Message from Roselyn Luna sent at 6/20/2023  1:29 PM CDT -----  Regarding: Follow up appt  Contact: [phone]  Hi pt has Labs scheduled for 06/26 and would like to know if a follow up is needed after the Labs? Pls call the pt at [phone] to spk with the pt.     Chief Complaint   Patient presents with    Annual Exam       HPI:  Hieu Duong is a 67 y.o. here for Medicare Annual Wellness Visit     Patient Active Problem List    Diagnosis Date Noted    Thrombocytosis 10/30/2023    Closed right hip fracture, initial encounter (HCC) 10/25/2023    Azotemia 10/25/2023    Anemia 10/25/2023    Depression 10/25/2023    Hypophosphatemia 10/25/2023    Type 1 diabetes mellitus without complication (HCC) 07/12/2021    Late effect of stroke 04/12/2021    Benign prostatic hyperplasia without lower urinary tract symptoms 01/04/2021    Chronic anticoagulation 01/04/2021    Essential hypertension 01/04/2021    Mixed hyperlipidemia 01/04/2021    Other insomnia 01/04/2021    IDDM (insulin dependent diabetes mellitus) 06/19/2019    History of CVA (cerebrovascular accident) 06/19/2019    History of GI bleed 06/19/2019    Vision loss        Current Outpatient Medications   Medication Sig Dispense Refill    hydrOXYzine HCl (ATARAX) 50 MG Tab TAKE 1 TABLET BY MOUTH THREE TIMES A DAY AS NEEDED FOR ANXIETY 270 Tablet 1    enalapril (VASOTEC) 2.5 MG Tab Take 1 Tablet by mouth every day. 100 Tablet 3    omeprazole (PRILOSEC) 40 MG delayed-release capsule Take 1 Capsule by mouth every morning before breakfast. EFORE BREAKFAST 100 Capsule 3    sildenafil citrate (VIAGRA) 100 MG tablet Take 100 mg by mouth 1 time a day as needed for Erectile Dysfunction.      Insulin Lispro-aabc, 1 U Dial, (OPALUMJEAAKASH KWDYLLANPEN) 100 UNIT/ML Solution Pen-injector Inject 3-4 Units under the skin 3 times a day before meals.      atorvastatin (LIPITOR) 20 MG Tab Take 1 Tablet by mouth every day. 30 Tablet 2    melatonin 3 MG Tab Take 2 Tablets by mouth at bedtime. 60 Tablet 2    warfarin (COUMADIN) 5 MG Tab TAKE ONE-HALF TO ONE TABLET BY MOUTH DAILY OR AS DIRECTED BY ANTICOAGULATION CLINIC 90 Tablet 1    Glucagon (GVOKE PFS) 1 MG/0.2ML Solution Prefilled Syringe Inject 1 Each under the skin.      diclofenac sodium  (VOLTAREN) 1 % Gel Apply 2 g topically. (Patient not taking: Reported on 12/11/2023)      escitalopram (LEXAPRO) 20 MG tablet Take 20 mg by mouth every day. (Patient not taking: Reported on 12/11/2023)      ferrous gluconate (FERGON) 324 (38 Fe) MG Tab Take 324 mg by mouth every day. (Patient not taking: Reported on 12/11/2023)      hydrOXYzine HCl (ATARAX) 50 MG Tab Take 1 Tablet by mouth 3 times a day as needed. (Patient not taking: Reported on 12/11/2023)      TRESIBA FLEXTOUCH 200 UNIT/ML Solution Pen-injector Inject 14 Units under the skin every day. (Patient not taking: Reported on 12/11/2023)      PREVIDENT 5000 BOOSTER PLUS 1.1 % Paste Apply 1 Application to teeth. (Patient not taking: Reported on 12/11/2023)      tizanidine (ZANAFLEX) 4 MG Tab Take 4 mg by mouth. (Patient not taking: Reported on 12/11/2023)      tizanidine (ZANAFLEX) 4 MG Tab Take 1 Tablet by mouth every 6 hours as needed (spasm). 30 Tablet 3    diclofenac sodium (VOLTAREN) 1 % Gel Apply 2 g topically 4 times a day as needed (spasm). 350 g 3    oxyCODONE immediate-release (ROXICODONE) 5 MG Tab Take 5 mg by mouth. (Patient not taking: Reported on 12/11/2023)      escitalopram (LEXAPRO) 20 MG tablet Take 1 Tablet by mouth every morning. 30 Tablet 3    omeprazole (PRILOSEC) 20 MG delayed-release capsule Take 1 Capsule by mouth every day. (Patient not taking: Reported on 12/11/2023) 30 Capsule 2    ferrous gluconate (FERGON) 324 (38 Fe) MG Tab Take 1 Tablet by mouth every morning with breakfast. 30 Tablet 2    Sodium Fluoride (PREVIDENT 5000 BOOSTER DT) Apply  to teeth.      Insulin Degludec (TRESIBA) 100 UNIT/ML Solution Inject 14 Units under the skin every day.       No current facility-administered medications for this visit.          Current supplements as per medication list.     Allergies: Tetanus toxoids and Tdap [dipth, acell pertus, tetanus]    Current social contact/activities: ***     He  reports that he has never smoked. He has  never used smokeless tobacco. He reports that he does not drink alcohol and does not use drugs.  Counseling given: Not Answered      ROS:    Gait: Uses {DEVICE:27928}  Ostomy: {AWV YES / NO:21192}  Other tubes: {AWV YES / NO:21192}  Amputations: {AWV YES / NO:21192}  Chronic oxygen use: {AWV YES / NO:21192}  Last eye exam: ***  Wears hearing aids: {AWV YES / NO:21192}   : {Urinary leakage?:20840}    Screening:  ***  Depression Screening  Little interest or pleasure in doing things?  0 - not at all  Feeling down, depressed , or hopeless? 0 - not at all  Patient Health Questionnaire Score: 0     If depressive symptoms identified deferred to follow up visit unless specifically addressed in assessment and plan.    Interpretation of PHQ-9 Total Score   Score Severity   1-4 No Depression   5-9 Mild Depression   10-14 Moderate Depression   15-19 Moderately Severe Depression   20-27 Severe Depression    Screening for Cognitive Impairment  Do you or any of your friends or family members have any concern about your memory?    Three Minute Recall (Banana, Sunrise, Chair)  /3    Richrad clock face with all 12 numbers and set the hands to show 20 past 8.  Yes    Cognitive concerns identified deferred for follow up unless specifically addressed in assessment and plan.    Fall Risk Assessment  Has the patient had two or more falls in the last year or any fall with injury in the last year?  No    Safety Assessment  Do you always wear your seatbelt?  Yes  Any changes to home needed to function safely? Yes  Difficulty hearing.  No  Patient counseled about all safety risks that were identified.    Functional Assessment ADLs  Are there any barriers preventing you from cooking for yourself or meeting nutritional needs?  No.    Are there any barriers preventing you from driving safely or obtaining transportation?  No.    Are there any barriers preventing you from using a telephone or calling for help?  No    Are there any barriers  preventing you from shopping?  No.    Are there any barriers preventing you from taking care of your own finances?  No    Are there any barriers preventing you from managing your medications?  No    Are there any barriers preventing you from showering, bathing or dressing yourself? No    Are there any barriers preventing you from doing housework or laundry? No  Are there any barriers preventing you from using the toilet?No  Are you currently engaging in any exercise or physical activity?  Yes.      Self-Assessment of Health  What is your perception of your health? Good  Do you sleep more than six hours a night? Yes  In the past 7 days, how much did pain keep you from doing your normal work? Some  Do you spend quality time with family or friends (virtually or in person)? Yes  Do you usually eat a heart healthy diet that constists of a variety of fruits, vegetables, whole grains and fiber? Yes  Do you eat foods high in fat and/or Fast Food more than three times per week? Yes    Advance Care Planning  Do you have an Advance Directive, Living Will, Durable Power of , or POLST? Yes    Living Will            Health Maintenance Summary            Overdue - Diabetes: Retinopathy Screening (Yearly) Overdue since 11/12/2021 11/12/2020  Done              Overdue - Annual Wellness Visit (Yearly) Overdue since 7/6/2022 07/06/2021  Done              Overdue - Diabetes: Monofilament / LE Exam (Yearly) Overdue since 7/7/2022 07/07/2021  Done              Ordered - Fasting Lipid Profile (Yearly) Ordered on 12/11/2023 01/13/2022  Outside Procedure: LIPID PROFILE    05/13/2021  Outside Procedure: LIPID PROFILE              Overdue - Pneumococcal Vaccine: 65+ Years (3 of 3 - PPSV23 or PCV20) Overdue since 12/19/2023 12/19/2018  Imm Admin: Pneumococcal polysaccharide vaccine (PPSV-23)    12/07/2015  Imm Admin: Pneumococcal Conjugate Vaccine (Prevnar/PCV-13)    06/28/2012  Imm Admin: Pneumococcal  polysaccharide vaccine (PPSV-23)              Overdue - Diabetes: Urine Protein Screening (Yearly) Overdue since 1/19/2024 01/19/2023  MICROALBUMIN CREAT RATIO URINE    01/13/2022  MICROALBUMIN CREAT RATIO URINE    07/07/2021  Done    05/13/2021  MICROALBUMIN CREAT RATIO URINE              A1c Screening (Every 6 Months) Tentatively due on 6/22/2024 12/22/2023  HEMOGLOBIN A1C    10/26/2023  HEMOGLOBIN A1C    10/22/2023  HEMOGLOBIN A1C    03/13/2023  POCT A1C    12/12/2022  POCT A1C    Only the first 5 history entries have been loaded, but more history exists.              SERUM CREATININE (Yearly) Next due on 12/22/2024 12/22/2023  COMP METABOLIC PANEL    12/22/2023  COMP METABOLIC PANEL    10/30/2023  Basic Metabolic Panel    10/26/2023  Comp Metabolic Panel (CMP)    10/24/2023  BASIC METABOLIC PANEL    Only the first 5 history entries have been loaded, but more history exists.              Colorectal Cancer Screening (Colonoscopy - Every 10 Years) Next due on 1/6/2031 01/06/2021  Colonoscopy (Done)    07/07/2010  Colonoscopy (Done)              Zoster (Shingles) Vaccines (Series Information) Completed      12/31/2020  Imm Admin: Zoster Vaccine Recombinant (RZV) (SHINGRIX)    09/08/2020  Imm Admin: Zoster Vaccine Recombinant (RZV) (SHINGRIX)    05/13/2013  Imm Admin: Zoster Vaccine Live (ZVL) (Zostavax) - HISTORICAL DATA              Influenza Vaccine (Series Information) Completed      10/09/2023  Outside Immunization: Influenza Vaccine, Quadrivalent, Adjuvanted    10/19/2022  Outside Immunization: Fluzone High-Dose Quad    09/22/2021  Imm Admin: Influenza Vaccine Adult HD    09/23/2020  Imm Admin: Influenza Vaccine Adult HD    08/05/2020  Imm Admin: Influenza Vaccine Quad Inj (Pf)    Only the first 5 history entries have been loaded, but more history exists.              COVID-19 Vaccine (Series Information) Completed      12/13/2023  Outside Immunization: COVID-19(PFR) 12yrs \T\ up     "09/27/2022  Imm Admin: MODERNA BIVALENT BOOSTER SARS-COV-2 VACCINE (6+)    04/16/2022  Imm Admin: MODERNA SARS-COV-2 VACCINE (12+)    11/03/2021  Imm Admin: MODERNA SARS-COV-2 VACCINE (12+)    04/01/2021  Imm Admin: MODERNA SARS-COV-2 VACCINE (12+)    Only the first 5 history entries have been loaded, but more history exists.              Hepatitis A Vaccine (Hep A) (Series Information) Aged Out      No completion history exists for this topic.              HPV Vaccines (Series Information) Aged Out      No completion history exists for this topic.              Polio Vaccine (Inactivated Polio) (Series Information) Aged Out      No completion history exists for this topic.              Meningococcal Immunization (Series Information) Aged Out      No completion history exists for this topic.              Discontinued - Hepatitis B Vaccine (Hep B)  Discontinued      No completion history exists for this topic.              Discontinued - IMM DTaP/Tdap/Td Vaccine  Discontinued      No completion history exists for this topic.              Discontinued - Hepatitis C Screening  Discontinued      No completion history exists for this topic.                    Patient Care Team:  Doug Molina M.D. as PCP - General (Family Medicine)        Social History     Tobacco Use    Smoking status: Never    Smokeless tobacco: Never   Substance Use Topics    Alcohol use: No    Drug use: No     Family History   Problem Relation Age of Onset    Diabetes Brother      He  has a past medical history of Diabetes (HCC), GI bleed, Hyperlipidemia, Hypertension, Stroke (HCC), and Vision loss.   No past surgical history on file.    Exam:   /60   Pulse 64   Temp 36.6 °C (97.9 °F) (Temporal)   Resp 18   Ht 1.702 m (5' 7\")   Wt 62.1 kg (137 lb)   SpO2 97%  Body mass index is 21.46 kg/m².    Hearing {GOOD/FAIR/POOR/EXCELLENT:64164}.    Dentition {DENTITION:43350}  Alert, oriented in no acute distress.  Eye contact is good, speech " goal directed, affect calm    Assessment and Plan. The following treatment and monitoring plan is recommended:  ***  There are no diagnoses linked to this encounter.    Services suggested: { AWV COORDINATION OF SERVICES:20405}  Health Care Screening: Age-appropriate preventive services recommended by USPTF and ACIP covered by Medicare were discussed today. Services ordered if indicated and agreed upon by the patient.  Referrals offered: Community-based lifestyle interventions to reduce health risks and promote self-management and wellness, fall prevention, nutrition, physical activity, tobacco-use cessation, weight loss, and mental health services as per orders if indicated.    Discussion today about general wellness and lifestyle habits:    Prevent falls and reduce trip hazards; Cautioned about securing or removing rugs.  Have a working fire alarm and carbon monoxide detector;   Engage in regular physical activity and social activities     Follow-up: No follow-ups on file.

## 2024-02-26 ENCOUNTER — TELEPHONE (OUTPATIENT)
Dept: OTOLARYNGOLOGY | Facility: CLINIC | Age: 69
End: 2024-02-26
Payer: MEDICARE

## 2024-02-26 NOTE — TELEPHONE ENCOUNTER
Returned call. Apt scheduled for 3/11. Pt thanked me and verbalized understanding.     ----- Message from Jasmeet Odom sent at 2/26/2024  2:01 PM CST -----  Type:  Sooner Apoointment Request    Caller is requesting a sooner appointment.  Caller declined first available appointment listed below.  Caller will not accept being placed on the waitlist and is requesting a message be sent to doctor.  Name of Caller:pt  When is the first available appointment?none with Dr Macdonald   Symptoms:hearing loss   Would the patient rather a call back or a response via [facility]? call  Best Call Back Number:[phone]  Additional Information: will like to discuss hearing aids

## 2024-03-11 ENCOUNTER — OFFICE VISIT (OUTPATIENT)
Dept: OTOLARYNGOLOGY | Facility: CLINIC | Age: 69
End: 2024-03-11
Payer: MEDICARE

## 2024-03-11 ENCOUNTER — CLINICAL SUPPORT (OUTPATIENT)
Dept: OTOLARYNGOLOGY | Facility: CLINIC | Age: 69
End: 2024-03-11
Payer: MEDICARE

## 2024-03-11 VITALS
DIASTOLIC BLOOD PRESSURE: 72 MMHG | WEIGHT: 225.5 LBS | SYSTOLIC BLOOD PRESSURE: 128 MMHG | HEART RATE: 56 BPM | BODY MASS INDEX: 31.46 KG/M2

## 2024-03-11 DIAGNOSIS — H91.8X3 ASYMMETRICAL HEARING LOSS: ICD-10-CM

## 2024-03-11 DIAGNOSIS — J31.0 CHRONIC RHINITIS: Chronic | ICD-10-CM

## 2024-03-11 DIAGNOSIS — H93.11 TINNITUS OF RIGHT EAR: ICD-10-CM

## 2024-03-11 DIAGNOSIS — H93.11 TINNITUS OF RIGHT EAR: Chronic | ICD-10-CM

## 2024-03-11 DIAGNOSIS — H90.3 SENSORINEURAL HEARING LOSS, BILATERAL: Primary | ICD-10-CM

## 2024-03-11 DIAGNOSIS — H90.3 SENSORINEURAL HEARING LOSS, BILATERAL: Primary | Chronic | ICD-10-CM

## 2024-03-11 DIAGNOSIS — R42 VERTIGO: ICD-10-CM

## 2024-03-11 PROCEDURE — 92557 COMPREHENSIVE HEARING TEST: CPT | Mod: S$GLB,,, | Performed by: AUDIOLOGIST

## 2024-03-11 PROCEDURE — 92567 TYMPANOMETRY: CPT | Mod: S$GLB,,, | Performed by: AUDIOLOGIST

## 2024-03-11 PROCEDURE — 99999 PR PBB SHADOW E&M-EST. PATIENT-LVL III: CPT | Mod: PBBFAC,,, | Performed by: OTOLARYNGOLOGY

## 2024-03-11 PROCEDURE — 99214 OFFICE O/P EST MOD 30 MIN: CPT | Mod: S$GLB,,, | Performed by: OTOLARYNGOLOGY

## 2024-03-11 NOTE — PROGRESS NOTES
Dani Gastelum  was seen today in the clinic for an audiologic evaluation.  His main complaint was hearing loss and constant tinnitus in the right ear.    Tympanometry revealed a Type A tympanogram for both ears.  Audiogram results revealed a mild sloping to moderated sensorineural hearing loss for the left ear and a mild sloping to severe sensorineural hearing loss for the right ear.  Speech reception thresholds were obtained at 25dB for the right ear and 20dB for the left ear.  Speech discrimination scores were 92% for the right ear and 88% for the left ear.    Recommendations:  Otologic evaluation  Annual evaluation  Hearing aid consult  Hearing protection in noise

## 2024-03-11 NOTE — PROGRESS NOTES
Chief Complaint   Patient presents with    Hearing Loss       HPI:  Patient is a 68 y.o. male who has previously seen me for hearing loss, rhinitis.      Since the last visit, the patient reports His hearing has worsened since last year.  He has discussed hearing aids with the audiologist today.  He feels the right-sided hearing is worse than the left with worse tinnitus on the right.  He also reports today a longstanding history of migraine headaches that do have more right-sided symptoms than left.  And he reports intermittent vertigo which has not been a recent issue but has occurred in the past.  He does not report having previous workup done with [facility] for the vertigo.    Active Ambulatory Problems     Diagnosis Date Noted    Chronic rhinitis     Vertigo, intermittent     Joint pain 07/31/2012    Localized primary osteoarthritis of lower leg 06/04/2013    Medial meniscus tear 06/04/2013    S/P knee surgery, medial menisectomy 07/01/2013    Primary localized osteoarthrosis, lower leg 08/27/2015    Thalassemia, beta     Neck pain 11/06/2019    Low back pain 11/06/2019    Cervical spondylosis with radiculopathy 01/03/2020    Papillary thyroid carcinoma 08/17/2020    Tinnitus of right ear 03/24/2022    Glaucoma 05/02/2022     Resolved Ambulatory Problems     Diagnosis Date Noted    Hyperlipidemia     BPH (benign prostatic hypertrophy)     Essential hypertension 06/05/2013    S/P TURP 09/04/2014    Chronic LBP 05/15/2015    DDD (degenerative disc disease), thoracic 05/15/2015    Prostate cancer 02/29/2016    Heart palpitations 10/04/2016    Coronary artery calcification seen on CAT scan 03/13/2017    Multinodular goiter 01/30/2020    Thyroid cancer 04/01/2020    Postoperative hypothyroidism 11/20/2020    History of prostate cancer 07/26/2019     Past Medical History:   Diagnosis Date    ALLERGIC RHINITIS     Allergy     BPH (benign prostatic hypertrophy)     Hypertension        Review of Systems  General: negative for  chills, fever or weight loss  Psychological: negative for mood changes or depression  Ophthalmic: negative for blurry vision, photophobia or eye pain  ENT: see HPI  Respiratory: no cough, shortness of breath, or wheezing  Cardiovascular: no chest pain or dyspnea on exertion  Gastrointestinal: no abdominal pain, change in bowel habits, or black/ bloody stools  Musculoskeletal: negative for gait disturbance or muscular weakness  Neurological: no syncope or seizures; no ataxia  Dermatological: negative for pruritis, rash and jaundice  Hematologic/lymphatic: no easy bruising, no new adenopathy    Physical Exam     Vitals:    03/11/24 1123   BP: 128/72   Pulse: (!) 56         Constitutional:   He is oriented to person, place, and time. Vital signs are normal. He appears well-developed and well-nourished. He appears alert. He is cooperative.  Non-toxic appearance. Normal speech.      Head:  Normocephalic and atraumatic. Salivary glands normal.  Facial strength is normal.     No nystagmus noted.     Ears:  Hearing normal to normal and whispered voice; external ear normal without scars, lesions, or masses; ear canal, tympanic membrane, and middle ear normal., right ear hearing normal to normal and whispered voice; external ear normal without scars, lesions, or masses; ear canal, tympanic membrane, and middle ear normal. and left ear hearing normal to normal and whispered voice; external ear normal without scars, lesions, or masses; ear canal, tympanic membrane, and middle ear normal..   Right Ear: Tympanic membrane is not perforated, not erythematous and not retracted. No middle ear effusion.   Left Ear: Tympanic membrane is not perforated, not erythematous and not retracted.  No middle ear effusion.     Nose:  Mucosal edema present. No rhinorrhea, septal deviation, nasal septal hematoma or polyps. No epistaxis. No turbinate masses and no turbinate hypertrophy (2+ size).  Right sinus exhibits no maxillary sinus tenderness  and no frontal sinus tenderness. Left sinus exhibits no maxillary sinus tenderness and no frontal sinus tenderness.     Mouth/Throat  Oropharynx clear and moist without lesions or asymmetry, normal uvula midline, lips, teeth, and gums normal and oropharynx normal. Normal dentition. No uvula swelling or oral lesions. No oropharyngeal exudate, posterior oropharyngeal edema or posterior oropharyngeal erythema. Mirror exam not performed due to patient tolerance.  Mirror exam not performed due to patient tolerance.      Neck:  Neck normal without thyromegaly masses, asymmetry, normal tracheal structure, crepitus, and tenderness, thyroid normal, trachea normal, full range of motion with neck supple and no adenopathy. No JVD present. Carotid bruit is not present. Thyroid tenderness is present. No edema and no erythema present. No thyroid mass and no thyromegaly present.     He has no cervical adenopathy.     Cardiovascular:    Normal rate, regular rhythm, normal heart sounds and rate and rhythm, heart sounds, and pulses normal.              Pulmonary/Chest:   Effort and breath sounds normal.     Psychiatric:   He has a normal mood and affect. His speech is normal and behavior is normal.     Neurological:   He is alert and oriented to person, place, and time. He has neurological normal, alert and oriented. No cranial nerve deficit.     Skin:   No abrasions, lacerations, lesions, or rashes.         Audiogram: Interpreted by me and reviewed with the patient today.    Bilateral sensorineural hearing loss mild sloping to severe high frequencies.  Worse overall on right than left.    Type a tympanograms bilaterally.                Assessment/Plan:      ICD-10-CM ICD-9-CM    1. Sensorineural hearing loss, bilateral  H90.3 389.18       2. Tinnitus of right ear  H93.11 388.30       3. Asymmetrical hearing loss  H91.8X3 389.9       4. Chronic rhinitis  J31.0 472.0       5. Vertigo  R42 780.4             I discussed with the patient  the ongoing asymmetry of his hearing as well as the history of intermittent vertigo.  We had a discussion about the possibility of hydrops as well as migraine associated vertigo, given his migraine history.    I suggested workup with [facility] and MRI due to the asymmetry, but the patient would like to hold off on these for now as both issues have been longstanding.  I counseled him on pros and cons of watchful waiting and he will let me know over the next few months if he would like to proceed with either test.      I gave him a copy of hydrops diet as well as migraine recommendations, and discussed with him the option of seeing neurology if the migraines continue.      He will follow-up with me in 1 year for annual audiogram or contact me sooner about any of the above-mentioned issues.    He is pursuing hearing aids was given a copy of his audiogram today.    Glenda Macdonald MD  [facility]

## 2024-03-11 NOTE — PATIENT INSTRUCTIONS
"We reviewed the patient's recent audiogram and hearing loss in detail.  We also discussed that he is a good candidate for hearing aids, if and when he the patient is motivated.  He was given handouts with information and pricing of hearing aids, and will contact audiology when ready to proceed.  We also discussed the use hearing protection when exposed to loud noise, including lawn equipment.      We discussed VNG and MRI for the asymmetry, but he would like to hold off on those for now and will contact me if he would like to proceed with those or with Neurology workup for the migraines/vertigo.        Hydrops Diet    The primary goal of treatment is to provide stable body fluid/blood levels so that secondary fluctuations in the inner ear fluid can be avoided. The secondary goal of treatment is to avoid migraine trigger foods, as migraine is commonly associated with Meniere's disease.     Aim for a 1.5 gram sodium intake diet. High sodium intake results in fluctuations in the inner ear fluid pressure and may increase your symptoms. Aim for a diet high in fresh fruits, vegetables and whole grains, and low in canned, frozen or processed foods.   One teaspoon of table salt has about 2 grams of sodium. Note that sodium (one of the two elements in table salt) is not exactly the same as sodium chloride (salt). There are many other foods and chemicals that we ingest that contain sodium.     Drink adequate amounts of fluid daily. This should include water, milk and low-sugar fruit juices (for example, cranberry or cranapple). Try to anticipate fluid loss which will occur with exercise or heat, and replace these fluids before they are lost. Some studies suggest that drinking more water helps, perhaps because it dilutes out the salt. Don't overdo it though - -one can get "water intoxication".     Avoid caffeine-containing fluids and foods (such as coffee, tea and chocolate). Caffeine has stimulant properties that may make " Meniere's symptoms worse. Caffeine also may make tinnitus louder. Two regular cups of coffee/day, or the equivalent in other beverages, is ususually safe however.     Limit your alcohol intake to one glass of beer or wine each day. Alcohol, especially red wine is a migraine trigger. Migraine and Meniere's are linked.     Avoid foods containing MSG (monosodium glutamate). This is often present in pre-packaged food products (such as flavored chips) and in Chinese food.     Distribute your food and fluid intake evenly throughout the day and from day to day. Eat approximately the same amount of food at each meal and do not skip meals. If you eat snacks, have them at regular times.     MIGRAINE  Finding out causes (or triggers) migraine (and migraine associated dizziness) and avoiding doing those things can help control attacks.     Stress, poor sleeping patterns, hormone changes, neck pain, smoking, and even weather changes have been shown to trigger symptoms.     Some migraine sufferers are sensitive to specific foods and are able to reduce their symptoms by avoiding those foods.    The list of foods that have been reported to cause migraine symptoms is quite long. But the most common foods are MSG (monosodium glutamate), aged cheese, red wine and beer, and chocolate. These foods contain substances called amines that are thought to trigger migraine symptoms in some people. There are many websites that provide lists of foods containing the different amines in great detail. The more common foods that have been identified as triggering a migraine may be summarized as follows:    Cheeses that are aged or ripened (such as Blue cheeses, Cheddar, Gouda, Brie, Parmesan, Edmond, Gruyere)  Alcohol, especially red wine or beer  Chocolate, cocoa, or carob  Milk products - sour cream, yogurt, cheesecake, buttermilk  Asian foods such as miso, tempeh, and foods prepared with soy sauce or Monosodium Glutamate (MSG)  Smoked, processed  or cured meats (such as hot dogs, hawthorne, sausage, bologna, salami, ham)  Pickled foods (such as pickles, sauerkraut, herring, olives)  Nuts and peanut butter  Baked items with yeast (yeast doughnuts, raised cakes and hot breads)  Certain fruits (citrus fruits, bananas, pineapple, avocados, figs)  Caffeine from coffee, tea or cola drinks    So how do you find out which foods might cause your attacks? Eliminating all the foods that might cause symptoms from the diet can be difficult. Some people have taken that approach and have switched to a diet of plain meat, rice, pasta or potato, plain vegetable and water. Then various migraine trigger foods are introduced one at a time to determine what foods might cause a headache or dizziness attack. The advantage of this technique is it is easy to see which foods cause symptoms. But many people find this method hard to follow because the diet is so limited.    An easier way to look at food triggers is to keep a detailed log of everything you eat and note when a migraine or dizziness attack happens. Look closely at the foods eaten within 24 hours of the attack. Some food triggers cause symptoms immediately; others will delay symptoms until the next day.    With either approach, eating regularly and drinking enough fluids throughout the day can help reduce symptoms. Low blood sugar from skipping meals can cause a headache or dizzy spell. Drinks containing Aspartame (NutraSweet) or caffeine can cause symptoms and should be avoided. Adopting a more healthful lifestyle can also produce positive changes in symptoms. Regular aerobic exercise is valuable in reducing stress, another potential migraine trigger.    Supplements may also be helpful in migraine:  Vitamin B2  Coenzyme Q10 (CoQ10)  Magnesium  Omega-3 Fatty  Acids    https://americanmigrainefoundation.org/  https://americanmigrainefoundation.org/resource-library/migraine-and-diet/  https://migraine.com/wp-content/uploads/2015/02/elimination_diet_comprehensive.pdf  https://americanmigrainefoundation.org/resource-library/planning-migraine-diet/

## 2024-04-08 RX ORDER — ROSUVASTATIN CALCIUM 40 MG/1
40 TABLET, COATED ORAL NIGHTLY
Qty: 90 TABLET | Refills: 3 | Status: SHIPPED | OUTPATIENT
Start: 2024-04-08 | End: 2025-04-08

## 2024-04-08 RX ORDER — AMLODIPINE BESYLATE 5 MG/1
5 TABLET ORAL DAILY
Qty: 90 TABLET | Refills: 3 | Status: SHIPPED | OUTPATIENT
Start: 2024-04-08 | End: 2025-04-08

## 2024-04-09 ENCOUNTER — TELEPHONE (OUTPATIENT)
Dept: INTERNAL MEDICINE | Facility: CLINIC | Age: 69
End: 2024-04-09
Payer: MEDICARE

## 2024-04-09 DIAGNOSIS — D56.1 THALASSEMIA, BETA: ICD-10-CM

## 2024-04-09 DIAGNOSIS — D56.1 BETA THALASSEMIA: ICD-10-CM

## 2024-04-09 DIAGNOSIS — I10 ESSENTIAL HYPERTENSION: Primary | ICD-10-CM

## 2024-04-09 NOTE — TELEPHONE ENCOUNTER
----- Message from Erica Garzon sent at 4/8/2024  5:27 PM CDT -----  Type:  Patient Returning Call    Who Called:pt  Who Left Message for Patient:pt  Does the patient know what this is regarding?:pt want to see if he can get his lab orders put in before his appt he see Dr Hill 07/11  Would the patient rather a call back or a response via [facility]? Call  Best Call Back Number:Click to [phone]  Additional Information: call back

## 2024-06-03 NOTE — TELEPHONE ENCOUNTER
No care due was identified.  Health [facility] Embedded Care Due Messages. Reference number: [id number].   6/03/2024 4:15:51 PM CDT

## 2024-06-04 RX ORDER — LOSARTAN POTASSIUM 100 MG/1
100 TABLET ORAL
Qty: 90 TABLET | Refills: 1 | Status: SHIPPED | OUTPATIENT
Start: 2024-06-04

## 2024-06-04 NOTE — TELEPHONE ENCOUNTER
Refill Decision Note   Dani Gastelum  is requesting a refill authorization.  Brief Assessment and Rationale for Refill:  Approve     Medication Therapy Plan:         Comments:     Note composed:3:53 AM 06/04/2024

## 2024-07-02 ENCOUNTER — LAB VISIT (OUTPATIENT)
Dept: LAB | Facility: HOSPITAL | Age: 69
End: 2024-07-02
Attending: INTERNAL MEDICINE
Payer: MEDICARE

## 2024-07-02 DIAGNOSIS — D56.1 BETA THALASSEMIA: ICD-10-CM

## 2024-07-02 DIAGNOSIS — I10 ESSENTIAL HYPERTENSION: ICD-10-CM

## 2024-07-02 DIAGNOSIS — D56.1 THALASSEMIA, BETA: ICD-10-CM

## 2024-07-02 LAB
ALBUMIN SERPL BCP-MCNC: 3.8 G/DL (ref 3.5–5.2)
ALP SERPL-CCNC: 39 U/L (ref 55–135)
ALT SERPL W/O P-5'-P-CCNC: 24 U/L (ref 10–44)
ANION GAP SERPL CALC-SCNC: 9 MMOL/L (ref 8–16)
AST SERPL-CCNC: 23 U/L (ref 10–40)
BASOPHILS # BLD AUTO: 0.03 K/UL (ref 0–0.2)
BASOPHILS NFR BLD: 0.5 % (ref 0–1.9)
BILIRUB SERPL-MCNC: 0.7 MG/DL (ref 0.1–1)
BUN SERPL-MCNC: 20 MG/DL (ref 8–23)
CALCIUM SERPL-MCNC: 9 MG/DL (ref 8.7–10.5)
CHLORIDE SERPL-SCNC: 105 MMOL/L (ref 95–110)
CHOLEST SERPL-MCNC: 117 MG/DL (ref 120–199)
CHOLEST/HDLC SERPL: 3.3 {RATIO} (ref 2–5)
CO2 SERPL-SCNC: 25 MMOL/L (ref 23–29)
CREAT SERPL-MCNC: 1.2 MG/DL (ref 0.5–1.4)
DIFFERENTIAL METHOD BLD: ABNORMAL
EOSINOPHIL # BLD AUTO: 0.2 K/UL (ref 0–0.5)
EOSINOPHIL NFR BLD: 3 % (ref 0–8)
ERYTHROCYTE [DISTWIDTH] IN BLOOD BY AUTOMATED COUNT: 14.2 % (ref 11.5–14.5)
EST. GFR  (NO RACE VARIABLE): >60 ML/MIN/1.73 M^2
ESTIMATED AVG GLUCOSE: 111 MG/DL (ref 68–131)
GLUCOSE SERPL-MCNC: 101 MG/DL (ref 70–110)
HBA1C MFR BLD: 5.5 % (ref 4–5.6)
HCT VFR BLD AUTO: 41.1 % (ref 40–54)
HDLC SERPL-MCNC: 35 MG/DL (ref 40–75)
HDLC SERPL: 29.9 % (ref 20–50)
HGB BLD-MCNC: 12.5 G/DL (ref 14–18)
IMM GRANULOCYTES # BLD AUTO: 0.01 K/UL (ref 0–0.04)
IMM GRANULOCYTES NFR BLD AUTO: 0.2 % (ref 0–0.5)
LDLC SERPL CALC-MCNC: 70.2 MG/DL (ref 63–159)
LYMPHOCYTES # BLD AUTO: 3.3 K/UL (ref 1–4.8)
LYMPHOCYTES NFR BLD: 57.4 % (ref 18–48)
MCH RBC QN AUTO: 24 PG (ref 27–31)
MCHC RBC AUTO-ENTMCNC: 30.4 G/DL (ref 32–36)
MCV RBC AUTO: 79 FL (ref 82–98)
MONOCYTES # BLD AUTO: 0.5 K/UL (ref 0.3–1)
MONOCYTES NFR BLD: 8.3 % (ref 4–15)
NEUTROPHILS # BLD AUTO: 1.7 K/UL (ref 1.8–7.7)
NEUTROPHILS NFR BLD: 30.6 % (ref 38–73)
NONHDLC SERPL-MCNC: 82 MG/DL
NRBC BLD-RTO: 0 /100 WBC
PLATELET # BLD AUTO: 248 K/UL (ref 150–450)
PMV BLD AUTO: 10 FL (ref 9.2–12.9)
POTASSIUM SERPL-SCNC: 4 MMOL/L (ref 3.5–5.1)
PROT SERPL-MCNC: 6.8 G/DL (ref 6–8.4)
RBC # BLD AUTO: 5.21 M/UL (ref 4.6–6.2)
SODIUM SERPL-SCNC: 139 MMOL/L (ref 136–145)
T4 FREE SERPL-MCNC: 1.1 NG/DL (ref 0.71–1.51)
TRIGL SERPL-MCNC: 59 MG/DL (ref 30–150)
TSH SERPL DL<=0.005 MIU/L-ACNC: 4.41 UIU/ML (ref 0.4–4)
VIT B12 SERPL-MCNC: >2000 PG/ML (ref 210–950)
WBC # BLD AUTO: 5.66 K/UL (ref 3.9–12.7)

## 2024-07-02 PROCEDURE — 84439 ASSAY OF FREE THYROXINE: CPT | Performed by: INTERNAL MEDICINE

## 2024-07-02 PROCEDURE — 82607 VITAMIN B-12: CPT | Performed by: INTERNAL MEDICINE

## 2024-07-02 PROCEDURE — 83036 HEMOGLOBIN GLYCOSYLATED A1C: CPT | Performed by: INTERNAL MEDICINE

## 2024-07-02 PROCEDURE — 36415 COLL VENOUS BLD VENIPUNCTURE: CPT | Mod: PN | Performed by: INTERNAL MEDICINE

## 2024-07-02 PROCEDURE — 84443 ASSAY THYROID STIM HORMONE: CPT | Performed by: INTERNAL MEDICINE

## 2024-07-02 PROCEDURE — 80061 LIPID PANEL: CPT | Performed by: INTERNAL MEDICINE

## 2024-07-02 PROCEDURE — 80053 COMPREHEN METABOLIC PANEL: CPT | Performed by: INTERNAL MEDICINE

## 2024-07-02 PROCEDURE — 85025 COMPLETE CBC W/AUTO DIFF WBC: CPT | Performed by: INTERNAL MEDICINE

## 2024-07-11 ENCOUNTER — OFFICE VISIT (OUTPATIENT)
Dept: INTERNAL MEDICINE | Facility: CLINIC | Age: 69
End: 2024-07-11
Payer: MEDICARE

## 2024-07-11 VITALS
HEART RATE: 64 BPM | TEMPERATURE: 98 F | SYSTOLIC BLOOD PRESSURE: 118 MMHG | HEIGHT: 71 IN | BODY MASS INDEX: 31.48 KG/M2 | DIASTOLIC BLOOD PRESSURE: 70 MMHG | WEIGHT: 224.88 LBS | RESPIRATION RATE: 16 BRPM

## 2024-07-11 DIAGNOSIS — Z85.46 HISTORY OF PROSTATE CANCER: ICD-10-CM

## 2024-07-11 DIAGNOSIS — D56.1 THALASSEMIA, BETA: ICD-10-CM

## 2024-07-11 DIAGNOSIS — E89.0 POSTOPERATIVE HYPOTHYROIDISM: ICD-10-CM

## 2024-07-11 DIAGNOSIS — I10 ESSENTIAL HYPERTENSION: Primary | ICD-10-CM

## 2024-07-11 DIAGNOSIS — I25.10 CORONARY ARTERY CALCIFICATION SEEN ON CAT SCAN: ICD-10-CM

## 2024-07-11 DIAGNOSIS — E78.49 OTHER HYPERLIPIDEMIA: ICD-10-CM

## 2024-07-11 PROBLEM — C73 PAPILLARY THYROID CARCINOMA: Status: RESOLVED | Noted: 2020-08-17 | Resolved: 2024-07-11

## 2024-07-11 PROCEDURE — 1101F PT FALLS ASSESS-DOCD LE1/YR: CPT | Mod: CPTII,S$GLB,, | Performed by: INTERNAL MEDICINE

## 2024-07-11 PROCEDURE — 3288F FALL RISK ASSESSMENT DOCD: CPT | Mod: CPTII,S$GLB,, | Performed by: INTERNAL MEDICINE

## 2024-07-11 PROCEDURE — 1157F ADVNC CARE PLAN IN RCRD: CPT | Mod: CPTII,S$GLB,, | Performed by: INTERNAL MEDICINE

## 2024-07-11 PROCEDURE — 3074F SYST BP LT 130 MM HG: CPT | Mod: CPTII,S$GLB,, | Performed by: INTERNAL MEDICINE

## 2024-07-11 PROCEDURE — 99999 PR PBB SHADOW E&M-EST. PATIENT-LVL III: CPT | Mod: PBBFAC,,, | Performed by: INTERNAL MEDICINE

## 2024-07-11 PROCEDURE — 3078F DIAST BP <80 MM HG: CPT | Mod: CPTII,S$GLB,, | Performed by: INTERNAL MEDICINE

## 2024-07-11 PROCEDURE — 3008F BODY MASS INDEX DOCD: CPT | Mod: CPTII,S$GLB,, | Performed by: INTERNAL MEDICINE

## 2024-07-11 PROCEDURE — 4010F ACE/ARB THERAPY RXD/TAKEN: CPT | Mod: CPTII,S$GLB,, | Performed by: INTERNAL MEDICINE

## 2024-07-11 PROCEDURE — 99214 OFFICE O/P EST MOD 30 MIN: CPT | Mod: S$GLB,,, | Performed by: INTERNAL MEDICINE

## 2024-07-11 PROCEDURE — 1125F AMNT PAIN NOTED PAIN PRSNT: CPT | Mod: CPTII,S$GLB,, | Performed by: INTERNAL MEDICINE

## 2024-07-11 PROCEDURE — 3044F HG A1C LEVEL LT 7.0%: CPT | Mod: CPTII,S$GLB,, | Performed by: INTERNAL MEDICINE

## 2024-07-11 NOTE — PROGRESS NOTES
Dictation #1  MRN:[id number]  [facility]:[id number] Subjective:       Patient ID: Dani Gastelum Jr. is a 68 y.o. male.    Chief Complaint: Hypertension (6 mos wlabs , had left knee replacement by dr stoddard in December. 12/4/23.   Pain at 2 today. ), numbness toe (Right foot/ big toe gets numbness.  Gets it in left toe less often.  ), and b12 deficiency (Was taking daily and stopped when b12 lab was high. )    HPI  The patient presents for follow-up of medical conditions which include hypertension, hypothyroidism, hyperlipidemia, beta thalassemia, personal history of prostate cancer, coronary artery calcification seen on CT scan.  The patient reports he is generally doing well.  He is still recovering since his left total knee replacement surgery.  Intermittent discomfort is noted.  No swelling is present.  She still has some difficulty climbing stairs at home.    He notices numbness in the right great toe.  He states his exercise tolerance is good.  He has only been limited by left knee discomfort.    He has hypertension.  He does not routinely check his blood pressure.    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    Review of Systems   Constitutional:  Negative for activity change, appetite change, fatigue and unexpected weight change.   HENT:  Negative for sinus pressure/congestion and sore throat.    Eyes:  Negative for visual disturbance.   Respiratory:  Negative for cough, chest tightness, shortness of breath and wheezing.    Cardiovascular:  Negative for chest pain, palpitations and leg swelling.   Gastrointestinal:  Negative for abdominal pain, blood in stool, nausea and vomiting.   Genitourinary:  Negative for dysuria, hematuria and urgency.   Musculoskeletal:  Negative for arthralgias, back pain, gait problem, joint swelling, myalgias and neck stiffness.   Integumentary:  Negative for color change and rash.   Neurological:  Negative for dizziness,  syncope, weakness, light-headedness, numbness and headaches.   Psychiatric/Behavioral:  Negative for sleep disturbance.             Physical Exam  Vitals and nursing note reviewed.   Constitutional:       General: He is not in acute distress.     Appearance: Normal appearance. He is well-developed.      Comments: The patient has gained 7 lb since 01/11/2024.   HENT:      Head: Normocephalic and atraumatic.   Eyes:      General: No scleral icterus.     Extraocular Movements: Extraocular movements intact.      Conjunctiva/sclera: Conjunctivae normal.   Neck:      Thyroid: No thyromegaly.      Vascular: No JVD.   Cardiovascular:      Rate and Rhythm: Normal rate and regular rhythm.      Heart sounds: Normal heart sounds. No murmur heard.     No friction rub. No gallop.   Pulmonary:      Effort: Pulmonary effort is normal. No respiratory distress.      Breath sounds: Normal breath sounds. No wheezing or rales.   Abdominal:      General: Bowel sounds are normal.      Palpations: Abdomen is soft. There is no mass.      Tenderness: There is no abdominal tenderness.   Musculoskeletal:         General: No tenderness. Normal range of motion.      Cervical back: Normal range of motion and neck supple.      Right lower leg: No edema.      Left lower leg: No edema.   Lymphadenopathy:      Cervical: No cervical adenopathy.   Skin:     General: Skin is warm and dry.      Findings: No rash.   Neurological:      Mental Status: He is alert and oriented to person, place, and time.      Comments: Gait is normal.   Psychiatric:         Mood and Affect: Mood normal.         Behavior: Behavior normal.           Lab Visit on 07/02/2024   Component Date Value Ref Range Status    Specimen UA 07/02/2024 Urine, Clean Catch   Final    Color, UA 07/02/2024 Yellow  Yellow, Straw, Radha Final    Appearance, UA 07/02/2024 Clear  Clear Final    pH, UA 07/02/2024 6.0  5.0 - 8.0 Final    Specific Gravity, UA 07/02/2024 1.020  1.005 - 1.030 Final     Protein, UA 07/02/2024 Negative  Negative Final    Comment: Recommend a 24 hour urine protein or a urine   protein/creatinine ratio if globulin induced proteinuria is  clinically suspected.      Glucose, UA 07/02/2024 Negative  Negative Final    Ketones, UA 07/02/2024 Negative  Negative Final    Bilirubin (UA) 07/02/2024 Negative  Negative Final    Occult Blood UA 07/02/2024 Negative  Negative Final    Nitrite, UA 07/02/2024 Negative  Negative Final    Leukocytes, UA 07/02/2024 Negative  Negative Final   Lab Visit on 07/02/2024   Component Date Value Ref Range Status    Sodium 07/02/2024 139  136 - 145 mmol/L Final    Potassium 07/02/2024 4.0  3.5 - 5.1 mmol/L Final    Chloride 07/02/2024 105  95 - 110 mmol/L Final    CO2 07/02/2024 25  23 - 29 mmol/L Final    Glucose 07/02/2024 101  70 - 110 mg/dL Final    BUN 07/02/2024 20  8 - 23 mg/dL Final    Creatinine 07/02/2024 1.2  0.5 - 1.4 mg/dL Final    Calcium 07/02/2024 9.0  8.7 - 10.5 mg/dL Final    Total Protein 07/02/2024 6.8  6.0 - 8.4 g/dL Final    Albumin 07/02/2024 3.8  3.5 - 5.2 g/dL Final    Total Bilirubin 07/02/2024 0.7  0.1 - 1.0 mg/dL Final    Comment: For infants and newborns, interpretation of results should be based  on gestational age, weight and in agreement with clinical  observations.    Premature Infant recommended reference ranges:  Up to 24 hours.............<8.0 mg/dL  Up to 48 hours............<12.0 mg/dL  3-5 days..................<15.0 mg/dL  6-29 days.................<15.0 mg/dL      Alkaline Phosphatase 07/02/2024 39 (L)  55 - 135 U/L Final    AST 07/02/2024 23  10 - 40 U/L Final    ALT 07/02/2024 24  10 - 44 U/L Final    eGFR 07/02/2024 >60.0  >60 mL/min/1.73 m^2 Final    Anion Gap 07/02/2024 9  8 - 16 mmol/L Final    Cholesterol 07/02/2024 117 (L)  120 - 199 mg/dL Final    Comment: The National Cholesterol Education Program (NCEP) has set the  following guidelines (reference ranges) for Cholesterol:  Optimal.....................<200  mg/dL  Borderline High.............200-239 mg/dL  High........................> or = 240 mg/dL      Triglycerides 07/02/2024 59  30 - 150 mg/dL Final    Comment: The National Cholesterol Education Program (NCEP) has set the  following guidelines (reference values) for triglycerides:  Normal......................<150 mg/dL  Borderline High.............150-199 mg/dL  High........................200-499 mg/dL      HDL 07/02/2024 35 (L)  40 - 75 mg/dL Final    Comment: The National Cholesterol Education Program (NCEP) has set the  following guidelines (reference values) for HDL Cholesterol:  Low...............<40 mg/dL  Optimal...........>60 mg/dL      LDL Cholesterol 07/02/2024 70.2  63.0 - 159.0 mg/dL Final    Comment: The National Cholesterol Education Program (NCEP) has set the  following guidelines (reference values) for LDL Cholesterol:  Optimal.......................<130 mg/dL  Borderline High...............130-159 mg/dL  High..........................160-189 mg/dL  Very High.....................>190 mg/dL      HDL/Cholesterol Ratio 07/02/2024 29.9  20.0 - 50.0 % Final    Total Cholesterol/HDL Ratio 07/02/2024 3.3  2.0 - 5.0 Final    Non-HDL Cholesterol 07/02/2024 82  mg/dL Final    Comment: Risk category and Non-HDL cholesterol goals:  Coronary heart disease (CHD)or equivalent (10-year risk of CHD >20%):  Non-HDL cholesterol goal     <130 mg/dL  Two or more CHD risk factors and 10-year risk of CHD <= 20%:  Non-HDL cholesterol goal     <160 mg/dL  0 to 1 CHD risk factor:  Non-HDL cholesterol goal     <190 mg/dL      WBC 07/02/2024 5.66  3.90 - 12.70 K/uL Final    RBC 07/02/2024 5.21  4.60 - 6.20 M/uL Final    Hemoglobin 07/02/2024 12.5 (L)  14.0 - 18.0 g/dL Final    Hematocrit 07/02/2024 41.1  40.0 - 54.0 % Final    MCV 07/02/2024 79 (L)  82 - 98 fL Final    MCH 07/02/2024 24.0 (L)  27.0 - 31.0 pg Final    MCHC 07/02/2024 30.4 (L)  32.0 - 36.0 g/dL Final    RDW 07/02/2024 14.2  11.5 - 14.5 % Final    Platelets  07/02/2024 248  150 - 450 K/uL Final    MPV 07/02/2024 10.0  9.2 - 12.9 fL Final    Immature Granulocytes 07/02/2024 0.2  0.0 - 0.5 % Final    Gran # (ANC) 07/02/2024 1.7 (L)  1.8 - 7.7 K/uL Final    Immature Grans (Abs) 07/02/2024 0.01  0.00 - 0.04 K/uL Final    Comment: Mild elevation in immature granulocytes is non specific and   can be seen in a variety of conditions including stress response,   acute inflammation, trauma and pregnancy. Correlation with other   laboratory and clinical findings is essential.      Lymph # 07/02/2024 3.3  1.0 - 4.8 K/uL Final    Mono # 07/02/2024 0.5  0.3 - 1.0 K/uL Final    Eos # 07/02/2024 0.2  0.0 - 0.5 K/uL Final    Baso # 07/02/2024 0.03  0.00 - 0.20 K/uL Final    nRBC 07/02/2024 0  0 /100 WBC Final    Gran % 07/02/2024 30.6 (L)  38.0 - 73.0 % Final    Lymph % 07/02/2024 57.4 (H)  18.0 - 48.0 % Final    Mono % 07/02/2024 8.3  4.0 - 15.0 % Final    Eosinophil % 07/02/2024 3.0  0.0 - 8.0 % Final    Basophil % 07/02/2024 0.5  0.0 - 1.9 % Final    Differential Method 07/02/2024 Automated   Final    Hemoglobin A1C 07/02/2024 5.5  4.0 - 5.6 % Final    Comment: ADA Screening Guidelines:  5.7-6.4%  Consistent with prediabetes  >or=6.5%  Consistent with diabetes    High levels of fetal hemoglobin interfere with the HbA1C  assay. Heterozygous hemoglobin variants (HbS, HgC, etc)do  not significantly interfere with this assay.   However, presence of multiple variants may affect accuracy.      Estimated Avg Glucose 07/02/2024 111  68 - 131 mg/dL Final    Vitamin B-12 07/02/2024 >2000 (H)  210 - 950 pg/mL Final    TSH 07/02/2024 4.409 (H)  0.400 - 4.000 uIU/mL Final    Free T4 07/02/2024 1.10  0.71 - 1.51 ng/dL Final       Assessment & Plan:      Dani was seen today for hypertension, numbness toe and b12 deficiency.  Patient will follow-up with endocrinology regarding thyroid management.    Current medications will be continued for treatment of hypertension and hyperlipidemia.    Updated  labs will be obtained in 6 months.    Diagnoses and all orders for this visit:    Essential hypertension    Other hyperlipidemia    Coronary artery calcification seen on CAT scan    Postoperative hypothyroidism    Thalassemia, beta    History of prostate cancer         Follow up in about 6 months (around 1/11/2025).     Frandy Hill MD

## 2024-07-16 ENCOUNTER — PATIENT MESSAGE (OUTPATIENT)
Dept: ENDOCRINOLOGY | Facility: CLINIC | Age: 69
End: 2024-07-16
Payer: MEDICARE

## 2024-07-16 DIAGNOSIS — E03.9 ACQUIRED HYPOTHYROIDISM: Primary | ICD-10-CM

## 2024-07-17 PROBLEM — E03.9 ACQUIRED HYPOTHYROIDISM: Status: ACTIVE | Noted: 2020-11-20

## 2024-07-17 RX ORDER — LEVOTHYROXINE SODIUM 150 UG/1
150 TABLET ORAL
Qty: 90 TABLET | Refills: 3 | Status: SHIPPED | OUTPATIENT
Start: 2024-07-17 | End: 2025-07-17

## 2024-09-04 ENCOUNTER — LAB VISIT (OUTPATIENT)
Dept: LAB | Facility: HOSPITAL | Age: 69
End: 2024-09-04
Attending: INTERNAL MEDICINE
Payer: MEDICARE

## 2024-09-04 DIAGNOSIS — E03.9 ACQUIRED HYPOTHYROIDISM: ICD-10-CM

## 2024-09-04 LAB — TSH SERPL DL<=0.005 MIU/L-ACNC: 0.85 UIU/ML (ref 0.4–4)

## 2024-09-04 PROCEDURE — 84443 ASSAY THYROID STIM HORMONE: CPT | Performed by: INTERNAL MEDICINE

## 2024-09-04 PROCEDURE — 36415 COLL VENOUS BLD VENIPUNCTURE: CPT | Mod: PN | Performed by: INTERNAL MEDICINE

## 2024-10-02 ENCOUNTER — OFFICE VISIT (OUTPATIENT)
Dept: PODIATRY | Facility: CLINIC | Age: 69
End: 2024-10-02
Payer: MEDICARE

## 2024-10-02 VITALS
HEART RATE: 55 BPM | WEIGHT: 226 LBS | TEMPERATURE: 98 F | HEIGHT: 71 IN | BODY MASS INDEX: 31.64 KG/M2 | SYSTOLIC BLOOD PRESSURE: 132 MMHG | DIASTOLIC BLOOD PRESSURE: 80 MMHG

## 2024-10-02 DIAGNOSIS — R20.2 NUMBNESS AND TINGLING OF BOTH FEET: Primary | ICD-10-CM

## 2024-10-02 DIAGNOSIS — R20.0 NUMBNESS AND TINGLING OF BOTH FEET: Primary | ICD-10-CM

## 2024-10-02 DIAGNOSIS — M48.02 CERVICAL STENOSIS OF SPINE: ICD-10-CM

## 2024-10-02 PROCEDURE — 4010F ACE/ARB THERAPY RXD/TAKEN: CPT | Mod: CPTII,S$GLB,, | Performed by: STUDENT IN AN ORGANIZED HEALTH CARE EDUCATION/TRAINING PROGRAM

## 2024-10-02 PROCEDURE — 1159F MED LIST DOCD IN RCRD: CPT | Mod: CPTII,S$GLB,, | Performed by: STUDENT IN AN ORGANIZED HEALTH CARE EDUCATION/TRAINING PROGRAM

## 2024-10-02 PROCEDURE — 99999 PR PBB SHADOW E&M-EST. PATIENT-LVL III: CPT | Mod: PBBFAC,,, | Performed by: STUDENT IN AN ORGANIZED HEALTH CARE EDUCATION/TRAINING PROGRAM

## 2024-10-02 PROCEDURE — 3044F HG A1C LEVEL LT 7.0%: CPT | Mod: CPTII,S$GLB,, | Performed by: STUDENT IN AN ORGANIZED HEALTH CARE EDUCATION/TRAINING PROGRAM

## 2024-10-02 PROCEDURE — 3288F FALL RISK ASSESSMENT DOCD: CPT | Mod: CPTII,S$GLB,, | Performed by: STUDENT IN AN ORGANIZED HEALTH CARE EDUCATION/TRAINING PROGRAM

## 2024-10-02 PROCEDURE — 1101F PT FALLS ASSESS-DOCD LE1/YR: CPT | Mod: CPTII,S$GLB,, | Performed by: STUDENT IN AN ORGANIZED HEALTH CARE EDUCATION/TRAINING PROGRAM

## 2024-10-02 PROCEDURE — 3075F SYST BP GE 130 - 139MM HG: CPT | Mod: CPTII,S$GLB,, | Performed by: STUDENT IN AN ORGANIZED HEALTH CARE EDUCATION/TRAINING PROGRAM

## 2024-10-02 PROCEDURE — 99203 OFFICE O/P NEW LOW 30 MIN: CPT | Mod: S$GLB,,, | Performed by: STUDENT IN AN ORGANIZED HEALTH CARE EDUCATION/TRAINING PROGRAM

## 2024-10-02 PROCEDURE — 1157F ADVNC CARE PLAN IN RCRD: CPT | Mod: CPTII,S$GLB,, | Performed by: STUDENT IN AN ORGANIZED HEALTH CARE EDUCATION/TRAINING PROGRAM

## 2024-10-02 PROCEDURE — 1125F AMNT PAIN NOTED PAIN PRSNT: CPT | Mod: CPTII,S$GLB,, | Performed by: STUDENT IN AN ORGANIZED HEALTH CARE EDUCATION/TRAINING PROGRAM

## 2024-10-02 PROCEDURE — 3079F DIAST BP 80-89 MM HG: CPT | Mod: CPTII,S$GLB,, | Performed by: STUDENT IN AN ORGANIZED HEALTH CARE EDUCATION/TRAINING PROGRAM

## 2024-10-02 PROCEDURE — 3008F BODY MASS INDEX DOCD: CPT | Mod: CPTII,S$GLB,, | Performed by: STUDENT IN AN ORGANIZED HEALTH CARE EDUCATION/TRAINING PROGRAM

## 2024-10-02 NOTE — PROGRESS NOTES
Subjective:     Patient    Dani Gastelum Jr. is a 68 y.o. male.    Problem    New to [facility]. Presents for years of numbness and tingling in both big toes, occasional flareups of pain at either heel and top of either foot. No incontinence, weakness, etc. No current spine pain but has known cervical spine issues. Not diabetic, no prior chronic alcohol use, takes multivitamin and B12 vitamin. Sleeps well.      History    History obtained from patient and review of medical records.     Past Medical History:   Diagnosis Date    ALLERGIC RHINITIS     Allergy     BPH (benign prostatic hypertrophy)     Hyperlipidemia     Hypertension     Thalassemia, beta     Thyroid cancer     Vertigo, intermittent        Past Surgical History:   Procedure Laterality Date    DISSECTION OF NECK Left 10/16/2020    Procedure: DISSECTION, NECK Central;  Surgeon: Una Lobato MD;  Location: [facility];  Service: General;  Laterality: Left;    KNEE SURGERY Right     NASAL SEPTUM SURGERY      SHOULDER SURGERY Right     right rotator cuff    THYROIDECTOMY N/A 10/16/2020    Procedure: THYROIDECTOMY- total;  Surgeon: Una Lobato MD;  Location: [facility];  Service: General;  Laterality: N/A;    TRANSURETHRAL RESECTION OF PROSTATE      VASECTOMY          Objective:     Vitals  Wt Readings from Last 1 Encounters:   10/02/24 102.5 kg (225 lb 15.5 oz)     Temp Readings from Last 1 Encounters:   10/02/24 98 °F (36.7 °C)     BP Readings from Last 1 Encounters:   10/02/24 132/80     Pulse Readings from Last 1 Encounters:   10/02/24 (!) 55       Dermatological Exam    Skin:  Pedal hair growth, skin color, and skin texture normal on right  Pedal hair growth, skin color, and skin texture normal on left    Nails:  All nails normal in length, thickness, color    Vascular Exam    Arteries:  Posterior tibial artery palpable on right  Dorsalis pedis artery palpable on right  Posterior tibial artery palpable on left  Dorsalis pedis artery  palpable on left    Veins:  Superficial veins unremarkable on right  Superficial veins unremarkable on left    Swelling:  None on right  None on left    Neurological Exam    Wayne touch test:  6/6 sites sensed, light touch intact    Provocation Sign:  + at tibial nerve bilaterally     Musculoskeletal Exam    Footwear:  Casual on right  Casual on left    Gait Exam:   Ambulatory Status: Ambulatory  Gait: Normal  Assistive Devices: None    Foot Progression Angle:  Normal on right  Normal on left    Right Lower Extremity Additional Findings:  Right foot and ankle function, strength, and range of motion unremarkable except as noted above.     Left Lower Extremity Additional Findings:  Left foot and ankle function, strength, and range of motion unremarkable except as noted above.    Imaging and Other Tests    Imaging:  Independently reviewed and interpreted imaging, findings are as follows: N/A     Assessment:     Encounter Diagnoses   Name Primary?    Numbness and tingling of both feet Yes    Cervical stenosis of spine         Plan:     I counseled the patient on his conditions, their implications and medical management.    Numbness and tingling in feet, cervical stenosis: chronic stable  -Highest suspicion is for cervical pathology causing nerve symptoms into the feet.   -Offered spinal PT, patient declined at this time.   -Recommended continued use of vibration gun, consider OTC TENS unit.     Return to clinic PRN.

## 2024-11-27 RX ORDER — LOSARTAN POTASSIUM 100 MG/1
100 TABLET ORAL
Qty: 90 TABLET | Refills: 2 | Status: SHIPPED | OUTPATIENT
Start: 2024-11-27

## 2024-11-27 NOTE — TELEPHONE ENCOUNTER
Refill Decision Note   Dani Gastelum  is requesting a refill authorization.  Brief Assessment and Rationale for Refill:  Approve     Medication Therapy Plan:        Comments:     Note composed:11:57 AM 11/27/2024

## 2024-11-27 NOTE — TELEPHONE ENCOUNTER
No care due was identified.  Health [facility] Embedded Care Due Messages. Reference number: [id number].   11/27/2024 9:03:48 AM CST

## 2025-01-09 ENCOUNTER — LAB VISIT (OUTPATIENT)
Dept: LAB | Facility: HOSPITAL | Age: 70
End: 2025-01-09
Attending: INTERNAL MEDICINE
Payer: MEDICARE

## 2025-01-09 DIAGNOSIS — I25.10 CORONARY ARTERY CALCIFICATION SEEN ON CAT SCAN: ICD-10-CM

## 2025-01-09 DIAGNOSIS — E89.0 POSTOPERATIVE HYPOTHYROIDISM: ICD-10-CM

## 2025-01-09 DIAGNOSIS — E78.49 OTHER HYPERLIPIDEMIA: ICD-10-CM

## 2025-01-09 DIAGNOSIS — I10 ESSENTIAL HYPERTENSION: ICD-10-CM

## 2025-01-09 LAB
ALBUMIN SERPL BCP-MCNC: 3.7 G/DL (ref 3.5–5.2)
ALP SERPL-CCNC: 44 U/L (ref 40–150)
ALT SERPL W/O P-5'-P-CCNC: 32 U/L (ref 10–44)
ANION GAP SERPL CALC-SCNC: 6 MMOL/L (ref 8–16)
AST SERPL-CCNC: 25 U/L (ref 10–40)
BASOPHILS # BLD AUTO: 0.04 K/UL (ref 0–0.2)
BASOPHILS NFR BLD: 0.7 % (ref 0–1.9)
BILIRUB SERPL-MCNC: 0.7 MG/DL (ref 0.1–1)
BUN SERPL-MCNC: 18 MG/DL (ref 8–23)
CALCIUM SERPL-MCNC: 9 MG/DL (ref 8.7–10.5)
CHLORIDE SERPL-SCNC: 109 MMOL/L (ref 95–110)
CHOLEST SERPL-MCNC: 102 MG/DL (ref 120–199)
CHOLEST/HDLC SERPL: 3.4 {RATIO} (ref 2–5)
CO2 SERPL-SCNC: 27 MMOL/L (ref 23–29)
CREAT SERPL-MCNC: 1.3 MG/DL (ref 0.5–1.4)
DIFFERENTIAL METHOD BLD: ABNORMAL
EOSINOPHIL # BLD AUTO: 0.2 K/UL (ref 0–0.5)
EOSINOPHIL NFR BLD: 2.8 % (ref 0–8)
ERYTHROCYTE [DISTWIDTH] IN BLOOD BY AUTOMATED COUNT: 14.5 % (ref 11.5–14.5)
EST. GFR  (NO RACE VARIABLE): 58.7 ML/MIN/1.73 M^2
GLUCOSE SERPL-MCNC: 101 MG/DL (ref 70–110)
HCT VFR BLD AUTO: 41.4 % (ref 40–54)
HDLC SERPL-MCNC: 30 MG/DL (ref 40–75)
HDLC SERPL: 29.4 % (ref 20–50)
HGB BLD-MCNC: 13.1 G/DL (ref 14–18)
IMM GRANULOCYTES # BLD AUTO: 0.01 K/UL (ref 0–0.04)
IMM GRANULOCYTES NFR BLD AUTO: 0.2 % (ref 0–0.5)
LDLC SERPL CALC-MCNC: 64.4 MG/DL (ref 63–159)
LYMPHOCYTES # BLD AUTO: 3 K/UL (ref 1–4.8)
LYMPHOCYTES NFR BLD: 52.7 % (ref 18–48)
MCH RBC QN AUTO: 24.5 PG (ref 27–31)
MCHC RBC AUTO-ENTMCNC: 31.6 G/DL (ref 32–36)
MCV RBC AUTO: 77 FL (ref 82–98)
MONOCYTES # BLD AUTO: 0.5 K/UL (ref 0.3–1)
MONOCYTES NFR BLD: 8.6 % (ref 4–15)
NEUTROPHILS # BLD AUTO: 2 K/UL (ref 1.8–7.7)
NEUTROPHILS NFR BLD: 35 % (ref 38–73)
NONHDLC SERPL-MCNC: 72 MG/DL
NRBC BLD-RTO: 0 /100 WBC
PLATELET # BLD AUTO: 249 K/UL (ref 150–450)
PMV BLD AUTO: 9.8 FL (ref 9.2–12.9)
POTASSIUM SERPL-SCNC: 4.4 MMOL/L (ref 3.5–5.1)
PROT SERPL-MCNC: 7.1 G/DL (ref 6–8.4)
RBC # BLD AUTO: 5.35 M/UL (ref 4.6–6.2)
SODIUM SERPL-SCNC: 142 MMOL/L (ref 136–145)
TRIGL SERPL-MCNC: 38 MG/DL (ref 30–150)
WBC # BLD AUTO: 5.69 K/UL (ref 3.9–12.7)

## 2025-01-09 PROCEDURE — 36415 COLL VENOUS BLD VENIPUNCTURE: CPT | Mod: PN | Performed by: INTERNAL MEDICINE

## 2025-01-09 PROCEDURE — 80053 COMPREHEN METABOLIC PANEL: CPT | Performed by: INTERNAL MEDICINE

## 2025-01-09 PROCEDURE — 80061 LIPID PANEL: CPT | Performed by: INTERNAL MEDICINE

## 2025-01-09 PROCEDURE — 85025 COMPLETE CBC W/AUTO DIFF WBC: CPT | Performed by: INTERNAL MEDICINE

## 2025-02-03 ENCOUNTER — LAB VISIT (OUTPATIENT)
Dept: LAB | Facility: HOSPITAL | Age: 70
End: 2025-02-03
Payer: MEDICARE

## 2025-02-03 ENCOUNTER — OFFICE VISIT (OUTPATIENT)
Dept: INTERNAL MEDICINE | Facility: CLINIC | Age: 70
End: 2025-02-03
Payer: MEDICARE

## 2025-02-03 VITALS
WEIGHT: 227.06 LBS | TEMPERATURE: 98 F | HEART RATE: 60 BPM | SYSTOLIC BLOOD PRESSURE: 128 MMHG | DIASTOLIC BLOOD PRESSURE: 84 MMHG | HEIGHT: 71 IN | OXYGEN SATURATION: 98 % | RESPIRATION RATE: 16 BRPM | BODY MASS INDEX: 31.79 KG/M2

## 2025-02-03 DIAGNOSIS — C61 MALIGNANT NEOPLASM OF PROSTATE: ICD-10-CM

## 2025-02-03 DIAGNOSIS — E78.49 OTHER HYPERLIPIDEMIA: ICD-10-CM

## 2025-02-03 DIAGNOSIS — M54.2 NECK PAIN: ICD-10-CM

## 2025-02-03 DIAGNOSIS — M54.9 BACK PAIN, UNSPECIFIED BACK LOCATION, UNSPECIFIED BACK PAIN LATERALITY, UNSPECIFIED CHRONICITY: ICD-10-CM

## 2025-02-03 DIAGNOSIS — E89.0 POSTOPERATIVE HYPOTHYROIDISM: ICD-10-CM

## 2025-02-03 DIAGNOSIS — I10 ESSENTIAL HYPERTENSION: ICD-10-CM

## 2025-02-03 DIAGNOSIS — D56.1 THALASSEMIA, BETA: ICD-10-CM

## 2025-02-03 DIAGNOSIS — I25.10 CORONARY ARTERY CALCIFICATION SEEN ON CAT SCAN: ICD-10-CM

## 2025-02-03 DIAGNOSIS — D64.9 HEMOGLOBIN LOW: ICD-10-CM

## 2025-02-03 DIAGNOSIS — I10 ESSENTIAL HYPERTENSION: Primary | ICD-10-CM

## 2025-02-03 DIAGNOSIS — R93.1 AGATSTON CAC SCORE 200-399: ICD-10-CM

## 2025-02-03 PROCEDURE — 3074F SYST BP LT 130 MM HG: CPT | Mod: CPTII,S$GLB,, | Performed by: INTERNAL MEDICINE

## 2025-02-03 PROCEDURE — 82728 ASSAY OF FERRITIN: CPT | Performed by: INTERNAL MEDICINE

## 2025-02-03 PROCEDURE — 85045 AUTOMATED RETICULOCYTE COUNT: CPT | Performed by: INTERNAL MEDICINE

## 2025-02-03 PROCEDURE — 84466 ASSAY OF TRANSFERRIN: CPT | Performed by: INTERNAL MEDICINE

## 2025-02-03 PROCEDURE — 99215 OFFICE O/P EST HI 40 MIN: CPT | Mod: S$GLB,,, | Performed by: INTERNAL MEDICINE

## 2025-02-03 PROCEDURE — 99999 PR PBB SHADOW E&M-EST. PATIENT-LVL V: CPT | Mod: PBBFAC,,, | Performed by: INTERNAL MEDICINE

## 2025-02-03 PROCEDURE — 1160F RVW MEDS BY RX/DR IN RCRD: CPT | Mod: CPTII,S$GLB,, | Performed by: INTERNAL MEDICINE

## 2025-02-03 PROCEDURE — 36415 COLL VENOUS BLD VENIPUNCTURE: CPT | Mod: PO | Performed by: INTERNAL MEDICINE

## 2025-02-03 PROCEDURE — 80053 COMPREHEN METABOLIC PANEL: CPT | Performed by: INTERNAL MEDICINE

## 2025-02-03 PROCEDURE — 3079F DIAST BP 80-89 MM HG: CPT | Mod: CPTII,S$GLB,, | Performed by: INTERNAL MEDICINE

## 2025-02-03 PROCEDURE — 84165 PROTEIN E-PHORESIS SERUM: CPT | Performed by: INTERNAL MEDICINE

## 2025-02-03 PROCEDURE — 84165 PROTEIN E-PHORESIS SERUM: CPT | Mod: 26,,, | Performed by: PATHOLOGY

## 2025-02-03 PROCEDURE — 1159F MED LIST DOCD IN RCRD: CPT | Mod: CPTII,S$GLB,, | Performed by: INTERNAL MEDICINE

## 2025-02-03 PROCEDURE — 1157F ADVNC CARE PLAN IN RCRD: CPT | Mod: CPTII,S$GLB,, | Performed by: INTERNAL MEDICINE

## 2025-02-03 PROCEDURE — 84153 ASSAY OF PSA TOTAL: CPT | Performed by: INTERNAL MEDICINE

## 2025-02-03 PROCEDURE — 85025 COMPLETE CBC W/AUTO DIFF WBC: CPT | Performed by: INTERNAL MEDICINE

## 2025-02-03 PROCEDURE — 3288F FALL RISK ASSESSMENT DOCD: CPT | Mod: CPTII,S$GLB,, | Performed by: INTERNAL MEDICINE

## 2025-02-03 PROCEDURE — 3008F BODY MASS INDEX DOCD: CPT | Mod: CPTII,S$GLB,, | Performed by: INTERNAL MEDICINE

## 2025-02-03 PROCEDURE — 1101F PT FALLS ASSESS-DOCD LE1/YR: CPT | Mod: CPTII,S$GLB,, | Performed by: INTERNAL MEDICINE

## 2025-02-03 PROCEDURE — 1126F AMNT PAIN NOTED NONE PRSNT: CPT | Mod: CPTII,S$GLB,, | Performed by: INTERNAL MEDICINE

## 2025-02-03 NOTE — PROGRESS NOTES
Subjective:       Patient ID: Dani Gastelum Jr. is a 69 y.o. male.    Chief Complaint: Anemia (6 MOS WLABS), Hypertension, and Hyperlipidemia    History of Present Illness    Dani presents today for follow-up of active medical conditions which include hypertension, hyperlipidemia, history of prostate cancer, low hemoglobin level, family history of multiple myeloma, and beta thalassemia.  The patient also has hypothyroidism and coronary artery calcification seen on CT scan.  He is due for follow-up with his cardiologist.    HYPERTENSION:  Home blood pressure readings show diastolic values typically in the 70s, consistent with previous in-office measurements. Systolic readings range from 120-140 mmHg, most commonly in the 130s. He takes amlodipine and losartan for management.    MEDICATIONS:  He takes rosuvastatin for cholesterol and levothyroxine for thyroid management. He takes all medications together but occasionally misses doses when not at home by day's end. He is completing an antibiotic course today as part of treatment for an infected tooth which was recently treated with a root canal.  No systemic symptoms have been noted.    MUSCULOSKELETAL PAIN:  He experiences bilateral ankle pain, more frequent in the right, occurring without apparent cause. The pain is primarily over his foot and around the ankle, coming and goes unpredictably. He also reports right great toe pain on both sides, noting this is the second occurrence. He has worsening back pain with morning stiffness, affecting both upper and lower back bilaterally, particularly near the abdominals and mid-back area. He has a history of scoliosis. The back pain does not limit his bending or mobility. He uses capsaicin cream for pain management, which helps him maintain daily activities.  The patient is concerned that he may have multiple myeloma in view of his family history-2 brothers with multiple myeloma and 1 cousin with myeloma.  He has been  reviewing his lab work and is questioning whether some of the flagged abnormal results could be due to an underlying malignancy such as myeloma.    NEUROPATHY:  He experiences intermittent numbness in both toes and fingers, predominantly affecting the right hand. 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 He was previously diagnosed with neuropathy but declined gabapentin and Klonopin due to concerns about sedation.    TINNITUS:  He has chronic constant tinnitus with rare intermittent episodes, which persists despite hearing aids. The condition was particularly bothersome this morning.    GENITOURINARY:  He reports increased nocturia approximately twice per night, which he attributes to increased evening fluid intake, particularly tea.    FAMILY HISTORY:  He has two brothers with multiple myeloma, and a first cousin who  from multiple myeloma seven years ago.      ROS:  ENT: +tinnitus  Genitourinary: +nocturia  Musculoskeletal: +joint pain, +back pain  Neurological: +numbness              Physical Exam  Vitals and nursing note reviewed.   Constitutional:       General: He is not in acute distress.     Appearance: Normal appearance. He is well-developed.   HENT:      Head: Normocephalic and atraumatic.      Right Ear: Tympanic membrane normal.      Left Ear: Tympanic membrane normal.      Ears:      Comments: A small amount of cerumen is present in both external auditory canals.  Eyes:      General: No scleral icterus.     Extraocular Movements: Extraocular movements intact.      Conjunctiva/sclera: Conjunctivae normal.   Neck:      Thyroid: No thyromegaly.      Vascular: No JVD.   Cardiovascular:      Rate and Rhythm: Normal rate and regular rhythm.      Heart sounds: Normal heart sounds. No murmur heard.     No friction rub. No gallop.   Pulmonary:      Effort: Pulmonary effort is normal. No respiratory distress.      Breath sounds: Normal breath sounds. No wheezing or  rales.   Abdominal:      General: Bowel sounds are normal.      Palpations: Abdomen is soft. There is no mass.      Tenderness: There is no abdominal tenderness.   Musculoskeletal:         General: No tenderness. Normal range of motion.      Cervical back: Normal range of motion and neck supple. No rigidity or tenderness.      Right lower leg: No edema.      Left lower leg: No edema.      Comments: Back: No lumbar paraspinous muscle spasm.  No vertebral percussion tenderness.  Scoliosis is present.  Negative straight leg raising test bilaterally.     Lymphadenopathy:      Cervical: No cervical adenopathy.   Skin:     General: Skin is warm and dry.      Findings: No rash.   Neurological:      Mental Status: He is alert and oriented to person, place, and time.      Comments: Gait is normal.   Psychiatric:         Mood and Affect: Mood normal.         Behavior: Behavior normal.           Lab Visit on 01/09/2025   Component Date Value Ref Range Status    Sodium 01/09/2025 142  136 - 145 mmol/L Final    Potassium 01/09/2025 4.4  3.5 - 5.1 mmol/L Final    Chloride 01/09/2025 109  95 - 110 mmol/L Final    CO2 01/09/2025 27  23 - 29 mmol/L Final    Glucose 01/09/2025 101  70 - 110 mg/dL Final    BUN 01/09/2025 18  8 - 23 mg/dL Final    Creatinine 01/09/2025 1.3  0.5 - 1.4 mg/dL Final    Calcium 01/09/2025 9.0  8.7 - 10.5 mg/dL Final    Total Protein 01/09/2025 7.1  6.0 - 8.4 g/dL Final    Albumin 01/09/2025 3.7  3.5 - 5.2 g/dL Final    Total Bilirubin 01/09/2025 0.7  0.1 - 1.0 mg/dL Final    Comment: For infants and newborns, interpretation of results should be based  on gestational age, weight and in agreement with clinical  observations.    Premature Infant recommended reference ranges:  Up to 24 hours.............<8.0 mg/dL  Up to 48 hours............<12.0 mg/dL  3-5 days..................<15.0 mg/dL  6-29 days.................<15.0 mg/dL      Alkaline Phosphatase 01/09/2025 44  40 - 150 U/L Final    AST 01/09/2025 25   10 - 40 U/L Final    ALT 01/09/2025 32  10 - 44 U/L Final    eGFR 01/09/2025 58.7 (A)  >60 mL/min/1.73 m^2 Final    Anion Gap 01/09/2025 6 (L)  8 - 16 mmol/L Final    Cholesterol 01/09/2025 102 (L)  120 - 199 mg/dL Final    Comment: The National Cholesterol Education Program (NCEP) has set the  following guidelines (reference ranges) for Cholesterol:  Optimal.....................<200 mg/dL  Borderline High.............200-239 mg/dL  High........................> or = 240 mg/dL      Triglycerides 01/09/2025 38  30 - 150 mg/dL Final    Comment: The National Cholesterol Education Program (NCEP) has set the  following guidelines (reference values) for triglycerides:  Normal......................<150 mg/dL  Borderline High.............150-199 mg/dL  High........................200-499 mg/dL      HDL 01/09/2025 30 (L)  40 - 75 mg/dL Final    Comment: The National Cholesterol Education Program (NCEP) has set the  following guidelines (reference values) for HDL Cholesterol:  Low...............<40 mg/dL  Optimal...........>60 mg/dL      LDL Cholesterol 01/09/2025 64.4  63.0 - 159.0 mg/dL Final    Comment: The National Cholesterol Education Program (NCEP) has set the  following guidelines (reference values) for LDL Cholesterol:  Optimal.......................<130 mg/dL  Borderline High...............130-159 mg/dL  High..........................160-189 mg/dL  Very High.....................>190 mg/dL      HDL/Cholesterol Ratio 01/09/2025 29.4  20.0 - 50.0 % Final    Total Cholesterol/HDL Ratio 01/09/2025 3.4  2.0 - 5.0 Final    Non-HDL Cholesterol 01/09/2025 72  mg/dL Final    Comment: Risk category and Non-HDL cholesterol goals:  Coronary heart disease (CHD)or equivalent (10-year risk of CHD >20%):  Non-HDL cholesterol goal     <130 mg/dL  Two or more CHD risk factors and 10-year risk of CHD <= 20%:  Non-HDL cholesterol goal     <160 mg/dL  0 to 1 CHD risk factor:  Non-HDL cholesterol goal     <190 mg/dL      WBC 01/09/2025  5.69  3.90 - 12.70 K/uL Final    RBC 01/09/2025 5.35  4.60 - 6.20 M/uL Final    Hemoglobin 01/09/2025 13.1 (L)  14.0 - 18.0 g/dL Final    Hematocrit 01/09/2025 41.4  40.0 - 54.0 % Final    MCV 01/09/2025 77 (L)  82 - 98 fL Final    MCH 01/09/2025 24.5 (L)  27.0 - 31.0 pg Final    MCHC 01/09/2025 31.6 (L)  32.0 - 36.0 g/dL Final    RDW 01/09/2025 14.5  11.5 - 14.5 % Final    Platelets 01/09/2025 249  150 - 450 K/uL Final    MPV 01/09/2025 9.8  9.2 - 12.9 fL Final    Immature Granulocytes 01/09/2025 0.2  0.0 - 0.5 % Final    Gran # (ANC) 01/09/2025 2.0  1.8 - 7.7 K/uL Final    Immature Grans (Abs) 01/09/2025 0.01  0.00 - 0.04 K/uL Final    Comment: Mild elevation in immature granulocytes is non specific and   can be seen in a variety of conditions including stress response,   acute inflammation, trauma and pregnancy. Correlation with other   laboratory and clinical findings is essential.      Lymph # 01/09/2025 3.0  1.0 - 4.8 K/uL Final    Mono # 01/09/2025 0.5  0.3 - 1.0 K/uL Final    Eos # 01/09/2025 0.2  0.0 - 0.5 K/uL Final    Baso # 01/09/2025 0.04  0.00 - 0.20 K/uL Final    nRBC 01/09/2025 0  0 /100 WBC Final    Gran % 01/09/2025 35.0 (L)  38.0 - 73.0 % Final    Lymph % 01/09/2025 52.7 (H)  18.0 - 48.0 % Final    Mono % 01/09/2025 8.6  4.0 - 15.0 % Final    Eosinophil % 01/09/2025 2.8  0.0 - 8.0 % Final    Basophil % 01/09/2025 0.7  0.0 - 1.9 % Final    Differential Method 01/09/2025 Automated   Final       Assessment & Plan:     Assessment & Plan     Evaluated blood pressure, noting home readings in 120-140/70s range   Assessed thyroid function, determining levels are stable on current levothyroxine   Considered family history of multiple myeloma and previous protein electrophoresis results from 2015 and 2019   Reviewed recent lab results, noting borderline EGFR (58.7) indicating potential kidney function changes   Analyzed CBC results, finding improvement in hematocrit and stable hemoglobin levels   Assessed  complaints of back pain, considering impact of known scoliosis   Evaluated report of foot and hand pain, considering neuropathy as a possible cause    THALASSEMIA, BETA:   Evaluated the patient's blood count, noting improvement since last year with hemoglobin at 13.1 and hematocrit at 41.   White blood cell count and platelets are normal.   Observed that hematocrit has been fluctuating between 39-42 over time.   Acknowledged the patient's concern about anemia and myeloma due to family history.   Confirmed that total protein levels are normal, which is often a sign checked for abnormal protein development in the blood.   As it has been a few years since the last test, recommended repeating the serum protein electrophoresis test to screen for abnormal proteins related to myeloma concerns.   Planned to order additional lab work, including serum protein electrophoresis, for comprehensive evaluation.    HYPERTENSION:   Continue amlodipine and Losartan for blood pressure management.   Current reading is 128/84.   Dani reports home readings with diastolic in the 70s and systolic ranging from 120 to 140, occasionally in the 130 range.    HYPERLIPIDEMIA:   Continue rosuvastatin for cholesterol management.   Cholesterol levels are low and satisfactory.   HDL is low, which is hereditary for the patient.    A referral to Cardiology for follow-up as recommended will be ordered.    THYROID DISORDER:   Continue levothyroxine for thyroid function management.      ANKLE AND FOOT PAIN:   Dani reports pain in right ankle and foot, with occasional pain in left ankle.   Advised continued use of capsaicin cream for pain management.    BACK PAIN:   Dani reports lower back pain, sometimes extending to left part of back and higher up, occurring for no apparent reason and present in the morning.   Dani has been diagnosed with scoliosis.   It's been a while since x-rays of the lumbar spine were taken.  Imaging studies of the cervical,  thoracic, and lumbar spine will be obtained.  At the request of the patient and referral to the back and spine clinic will also be ordered.   Advised occasional use of pain medication as needed.   Inquired about limitations in bending due to back pain.    KIDNEY FUNCTION:   eGFR is borderline at 58.7, lower than previous values (>60).   Recommend increased water intake to address potential dehydration affecting kidney function.   Advised to temporarily avoid non-steroidal anti-inflammatory medicines (e.g., naproxen, ibuprofen) due to fluctuations in kidney function.   Recommend using acetaminophen instead for pain relief as it's safer for kidney function.    NEUROPATHY:   Dani reports numbness in toes and fingers, particularly in the right hand.   Previous diagnosis of neuropathy by a podiatrist.   Podiatrist offered gabapentin or clonazepam for treatment, which patient declined.    TINNITUS:   Dani reports constant tinnitus that rarely comes and goes, and is present during the conversation.  He states the tinnitus is not obliterated with use of his hearing aids.    NOCTURIA:   Dani reports increased frequency of urination at night, going twice per night.   Inquired about fluid intake, particularly tea consumption.    OTHER INSTRUCTIONS:   Observed some wax buildup in the ear, but it's not blocking the ear canal.   Inquired about patient's energy levels.       Total visit time with the patient: 45 minutes.  Follow up in about 6 months (around 8/3/2025).     Frandy Hill MD

## 2025-02-04 LAB
ALBUMIN SERPL BCP-MCNC: 4 G/DL (ref 3.5–5.2)
ALBUMIN SERPL ELPH-MCNC: 4.13 G/DL (ref 3.35–5.55)
ALP SERPL-CCNC: 44 U/L (ref 40–150)
ALPHA1 GLOB SERPL ELPH-MCNC: 0.25 G/DL (ref 0.17–0.41)
ALPHA2 GLOB SERPL ELPH-MCNC: 0.67 G/DL (ref 0.43–0.99)
ALT SERPL W/O P-5'-P-CCNC: 29 U/L (ref 10–44)
ANION GAP SERPL CALC-SCNC: 10 MMOL/L (ref 8–16)
AST SERPL-CCNC: 27 U/L (ref 10–40)
B-GLOBULIN SERPL ELPH-MCNC: 0.77 G/DL (ref 0.5–1.1)
BASOPHILS # BLD AUTO: 0.03 K/UL (ref 0–0.2)
BASOPHILS NFR BLD: 0.5 % (ref 0–1.9)
BILIRUB SERPL-MCNC: 0.6 MG/DL (ref 0.1–1)
BUN SERPL-MCNC: 18 MG/DL (ref 8–23)
CALCIUM SERPL-MCNC: 9.6 MG/DL (ref 8.7–10.5)
CHLORIDE SERPL-SCNC: 106 MMOL/L (ref 95–110)
CO2 SERPL-SCNC: 24 MMOL/L (ref 23–29)
COMPLEXED PSA SERPL-MCNC: 0.94 NG/ML (ref 0–4)
CREAT SERPL-MCNC: 1.2 MG/DL (ref 0.5–1.4)
DIFFERENTIAL METHOD BLD: ABNORMAL
EOSINOPHIL # BLD AUTO: 0.1 K/UL (ref 0–0.5)
EOSINOPHIL NFR BLD: 2.1 % (ref 0–8)
ERYTHROCYTE [DISTWIDTH] IN BLOOD BY AUTOMATED COUNT: 14.3 % (ref 11.5–14.5)
EST. GFR  (NO RACE VARIABLE): >60 ML/MIN/1.73 M^2
FERRITIN SERPL-MCNC: 283 NG/ML (ref 20–300)
GAMMA GLOB SERPL ELPH-MCNC: 1.18 G/DL (ref 0.67–1.58)
GLUCOSE SERPL-MCNC: 93 MG/DL (ref 70–110)
HCT VFR BLD AUTO: 41.2 % (ref 40–54)
HGB BLD-MCNC: 13.1 G/DL (ref 14–18)
IMM GRANULOCYTES # BLD AUTO: 0.01 K/UL (ref 0–0.04)
IMM GRANULOCYTES NFR BLD AUTO: 0.2 % (ref 0–0.5)
IRON SERPL-MCNC: 98 UG/DL (ref 45–160)
LYMPHOCYTES # BLD AUTO: 3 K/UL (ref 1–4.8)
LYMPHOCYTES NFR BLD: 49.8 % (ref 18–48)
MCH RBC QN AUTO: 24.4 PG (ref 27–31)
MCHC RBC AUTO-ENTMCNC: 31.8 G/DL (ref 32–36)
MCV RBC AUTO: 77 FL (ref 82–98)
MONOCYTES # BLD AUTO: 0.5 K/UL (ref 0.3–1)
MONOCYTES NFR BLD: 8.7 % (ref 4–15)
NEUTROPHILS # BLD AUTO: 2.3 K/UL (ref 1.8–7.7)
NEUTROPHILS NFR BLD: 38.7 % (ref 38–73)
NRBC BLD-RTO: 0 /100 WBC
PLATELET # BLD AUTO: 255 K/UL (ref 150–450)
PMV BLD AUTO: 10.2 FL (ref 9.2–12.9)
POTASSIUM SERPL-SCNC: 4.7 MMOL/L (ref 3.5–5.1)
PROT SERPL-MCNC: 7 G/DL (ref 6–8.4)
PROT SERPL-MCNC: 7.5 G/DL (ref 6–8.4)
RBC # BLD AUTO: 5.36 M/UL (ref 4.6–6.2)
RETICS/RBC NFR AUTO: 2.6 % (ref 0.4–2)
SATURATED IRON: 27 % (ref 20–50)
SODIUM SERPL-SCNC: 140 MMOL/L (ref 136–145)
TOTAL IRON BINDING CAPACITY: 357 UG/DL (ref 250–450)
TRANSFERRIN SERPL-MCNC: 241 MG/DL (ref 200–375)
WBC # BLD AUTO: 6.06 K/UL (ref 3.9–12.7)

## 2025-02-06 LAB — PATHOLOGIST INTERPRETATION SPE: NORMAL

## 2025-02-17 ENCOUNTER — PATIENT MESSAGE (OUTPATIENT)
Dept: OTOLARYNGOLOGY | Facility: CLINIC | Age: 70
End: 2025-02-17
Payer: MEDICARE

## 2025-02-24 ENCOUNTER — LAB VISIT (OUTPATIENT)
Dept: LAB | Facility: HOSPITAL | Age: 70
End: 2025-02-24
Attending: INTERNAL MEDICINE
Payer: MEDICARE

## 2025-02-24 ENCOUNTER — OFFICE VISIT (OUTPATIENT)
Dept: CARDIOLOGY | Facility: CLINIC | Age: 70
End: 2025-02-24
Payer: MEDICARE

## 2025-02-24 VITALS
BODY MASS INDEX: 31.52 KG/M2 | SYSTOLIC BLOOD PRESSURE: 136 MMHG | HEART RATE: 62 BPM | WEIGHT: 226 LBS | DIASTOLIC BLOOD PRESSURE: 84 MMHG

## 2025-02-24 DIAGNOSIS — E78.2 MIXED HYPERLIPIDEMIA: Primary | ICD-10-CM

## 2025-02-24 DIAGNOSIS — I10 ESSENTIAL HYPERTENSION: Chronic | ICD-10-CM

## 2025-02-24 DIAGNOSIS — E78.2 MIXED HYPERLIPIDEMIA: ICD-10-CM

## 2025-02-24 DIAGNOSIS — R93.1 AGATSTON CORONARY ARTERY CALCIUM SCORE GREATER THAN 400: ICD-10-CM

## 2025-02-24 PROCEDURE — 93005 ELECTROCARDIOGRAM TRACING: CPT

## 2025-02-24 PROCEDURE — 93000 ELECTROCARDIOGRAM COMPLETE: CPT | Mod: S$GLB,,, | Performed by: INTERNAL MEDICINE

## 2025-02-24 PROCEDURE — 1101F PT FALLS ASSESS-DOCD LE1/YR: CPT | Mod: CPTII,S$GLB,, | Performed by: INTERNAL MEDICINE

## 2025-02-24 PROCEDURE — 1126F AMNT PAIN NOTED NONE PRSNT: CPT | Mod: CPTII,S$GLB,, | Performed by: INTERNAL MEDICINE

## 2025-02-24 PROCEDURE — 3079F DIAST BP 80-89 MM HG: CPT | Mod: CPTII,S$GLB,, | Performed by: INTERNAL MEDICINE

## 2025-02-24 PROCEDURE — 3075F SYST BP GE 130 - 139MM HG: CPT | Mod: CPTII,S$GLB,, | Performed by: INTERNAL MEDICINE

## 2025-02-24 PROCEDURE — 1157F ADVNC CARE PLAN IN RCRD: CPT | Mod: CPTII,S$GLB,, | Performed by: INTERNAL MEDICINE

## 2025-02-24 PROCEDURE — 83695 ASSAY OF LIPOPROTEIN(A): CPT | Performed by: INTERNAL MEDICINE

## 2025-02-24 PROCEDURE — 99999 PR PBB SHADOW E&M-EST. PATIENT-LVL III: CPT | Mod: PBBFAC,,, | Performed by: INTERNAL MEDICINE

## 2025-02-24 PROCEDURE — 1159F MED LIST DOCD IN RCRD: CPT | Mod: CPTII,S$GLB,, | Performed by: INTERNAL MEDICINE

## 2025-02-24 PROCEDURE — 3008F BODY MASS INDEX DOCD: CPT | Mod: CPTII,S$GLB,, | Performed by: INTERNAL MEDICINE

## 2025-02-24 PROCEDURE — 82172 ASSAY OF APOLIPOPROTEIN: CPT | Performed by: INTERNAL MEDICINE

## 2025-02-24 PROCEDURE — 3288F FALL RISK ASSESSMENT DOCD: CPT | Mod: CPTII,S$GLB,, | Performed by: INTERNAL MEDICINE

## 2025-02-24 PROCEDURE — 99204 OFFICE O/P NEW MOD 45 MIN: CPT | Mod: 25,S$GLB,, | Performed by: INTERNAL MEDICINE

## 2025-02-24 PROCEDURE — 36415 COLL VENOUS BLD VENIPUNCTURE: CPT | Performed by: INTERNAL MEDICINE

## 2025-02-24 NOTE — PROGRESS NOTES
Subjective:   02/24/2025     Patient ID:  Dani Gastelum Jr. is a 69 y.o. male who presents for evaulation of Establish Care (Calcium scores)       Patient comes in for assessment of an elevated calcium score, this has 6452 years ago when he was 67.  The score was 645, that is in the 92nd percentile.  He does not have a history of cardiac events.  He does have hypertension, appears well controlled.    Hypercholesterolemia is treated with rosuvastatin 40 mg daily.  His lab work from January shows total cholesterol of 102, HDL 30, LDL 65 and triglycerides 38.      An EKG today shows normal sinus rhythm, normal.      He does not have diabetes.      He does not smoke cigarettes.      He is retired .        Past Medical History:   Diagnosis Date    ALLERGIC RHINITIS     Allergy     BPH (benign prostatic hypertrophy)     Thalassemia, beta     Thyroid cancer     Vertigo, intermittent        Review of patient's allergies indicates:  No Known Allergies    Current Medications[1]     Objective:   Review of Systems   Cardiovascular:  Negative for chest pain, claudication, cyanosis, dyspnea on exertion, irregular heartbeat, leg swelling, near-syncope, orthopnea, palpitations, paroxysmal nocturnal dyspnea and syncope.         Vitals:    02/24/25 1420   BP: 136/84   Pulse: 62     Wt Readings from Last 3 Encounters:   02/24/25 102.5 kg (225 lb 15.5 oz)   02/03/25 103 kg (227 lb 1.2 oz)   10/02/24 102.5 kg (225 lb 15.5 oz)     Temp Readings from Last 3 Encounters:   02/03/25 97.7 °F (36.5 °C) (Skin)   10/02/24 98 °F (36.7 °C)   07/11/24 98.3 °F (36.8 °C) (Skin)     BP Readings from Last 3 Encounters:   02/24/25 136/84   02/03/25 128/84   10/02/24 132/80     Pulse Readings from Last 3 Encounters:   02/24/25 62   02/03/25 60   10/02/24 (!) 55             Physical Exam  Vitals reviewed.   Constitutional:       General: He is not in acute distress.     Appearance: He is well-developed.   HENT:      Head: Normocephalic and  atraumatic.      Nose: Nose normal.   Eyes:      Conjunctiva/sclera: Conjunctivae normal.      Pupils: Pupils are equal, round, and reactive to light.   Neck:      Vascular: No carotid bruit or JVD.   Cardiovascular:      Rate and Rhythm: Normal rate and regular rhythm.      Pulses: Normal pulses and intact distal pulses.      Heart sounds: Normal heart sounds. No murmur heard.     No friction rub. No gallop.   Pulmonary:      Effort: Pulmonary effort is normal. No respiratory distress.      Breath sounds: Normal breath sounds. No wheezing or rales.   Chest:      Chest wall: No tenderness.   Abdominal:      General: Bowel sounds are normal. There is no distension.      Palpations: Abdomen is soft.      Tenderness: There is no abdominal tenderness.   Musculoskeletal:         General: No tenderness or deformity. Normal range of motion.      Cervical back: Normal range of motion and neck supple.      Right lower leg: No edema.      Left lower leg: No edema.   Skin:     General: Skin is warm and dry.      Findings: No erythema or rash.   Neurological:      Mental Status: He is alert and oriented to person, place, and time.      Cranial Nerves: No cranial nerve deficit.      Motor: No abnormal muscle tone.      Coordination: Coordination normal.   Psychiatric:         Behavior: Behavior normal.         Thought Content: Thought content normal.         Judgment: Judgment normal.           Lab Results   Component Value Date    CHOL 102 (L) 01/09/2025    CHOL 117 (L) 07/02/2024    CHOL 127 01/04/2024     Lab Results   Component Value Date    HDL 30 (L) 01/09/2025    HDL 35 (L) 07/02/2024    HDL 38 (L) 01/04/2024     Lab Results   Component Value Date    LDLCALC 64.4 01/09/2025    LDLCALC 70.2 07/02/2024    LDLCALC 78.6 01/04/2024     Lab Results   Component Value Date    ALT 29 02/03/2025    AST 27 02/03/2025    AST 25 01/09/2025    AST 23 07/02/2024     Lab Results   Component Value Date    CREATININE 1.2 02/03/2025    BUN  18 02/03/2025     02/03/2025    K 4.7 02/03/2025    CO2 24 02/03/2025    CO2 27 01/09/2025    CO2 25 07/02/2024     Lab Results   Component Value Date    HGB 13.1 (L) 02/03/2025    HCT 41.2 02/03/2025    HCT 41.4 01/09/2025    HCT 41.1 07/02/2024           Normal sinus rhythm, normal EKG                Assessment and Plan:     Mixed hyperlipidemia  Comments:  Apolipoprotein B and lipoprotein small A today  Orders:  -     Ambulatory referral/consult to Cardiology  -     Lipoprotein A (LPA); Future; Expected date: 03/03/2025  -     Apolipoprotein B; Future; Expected date: 02/25/2025    Essential hypertension  Comments:  well controlled  Orders:  -     IN OFFICE EKG 12-LEAD (to [place])    Agatston coronary artery calcium score  of 645, 92nd percentile  Comments:  appears to be doing everything he can to prevent heart attack.         No follow-ups on file.          Future Appointments   Date Time Provider Department Center   2/24/2025  3:00 PM LAB, [facility] [place]   2/28/2025  9:15 AM METH XR1 300 LB LIMIT [facility] XRAY [place]   2/28/2025  9:30 AM METH XR1 300 LB LIMIT M[facility] XRAY [place]   2/28/2025  9:45 AM METH XR1 300 LB LIMIT METH XRAY [place]   2/28/2025 10:30 AM Radha Baca PA-C [facility] [place]   3/3/2025  1:00 PM Prudence Rizo CCC-A [facility]   3/3/2025  1:40 PM Glenda Macdonald MD [facility]   7/29/2025  7:30 AM LAB, [facility]   8/5/2025 10:30 AM Frandy Hill MD [facility] IM [place]                    [1]   Current Outpatient Medications:     amLODIPine (NORVASC) 5 MG tablet, Take 1 tablet (5 mg total) by mouth once daily., Disp: 90 tablet, Rfl: 3    aspirin 81 MG Chew, Take 81 mg by mouth once daily., Disp: , Rfl:     co-enzyme Q-10 30 mg capsule, Take 30 mg by mouth once daily., Disp: , Rfl:     dorzolamide-timolol 2-0.5% (COSOPT) 22.3-6.8 mg/mL ophthalmic solution, INSTILL 1 DROP IN BOTH EYES TWICE DAILY AS DIRECTED, Disp: ,  Rfl:     fish oil-omega-3 fatty acids 300-1,000 mg capsule, Take 2 g by mouth once daily., Disp: , Rfl:     FOLIC ACID/MV,FE,OTHER MIN (CENTRUM ORAL), Take 1 tablet by mouth once daily., Disp: , Rfl:     glucosamine-chondroitin 500-400 mg tablet, Take 1 tablet by mouth 3 (three) times daily. Pt taking one pill once a day., Disp: , Rfl:     latanoprost 0.005 % ophthalmic solution, INT 1 GTT IN OU QD HS, Disp: , Rfl: 3    levothyroxine (SYNTHROID) 150 MCG tablet, Take 1 tablet (150 mcg total) by mouth before breakfast., Disp: 90 tablet, Rfl: 3    losartan (COZAAR) 100 MG tablet, TAKE 1 TABLET(100 MG) BY MOUTH EVERY DAY, Disp: 90 tablet, Rfl: 2    rosuvastatin (CRESTOR) 40 MG Tab, Take 1 tablet (40 mg total) by mouth every evening., Disp: 90 tablet, Rfl: 3    VOLTAREN 1 % Gel, as needed., Disp: , Rfl:

## 2025-02-25 LAB
OHS QRS DURATION: 90 MS
OHS QTC CALCULATION: 427 MS

## 2025-02-27 LAB — APO B SERPL-MCNC: 71 MG/DL

## 2025-02-28 ENCOUNTER — HOSPITAL ENCOUNTER (OUTPATIENT)
Dept: RADIOLOGY | Facility: HOSPITAL | Age: 70
Discharge: HOME | End: 2025-02-28
Attending: INTERNAL MEDICINE
Payer: MEDICARE

## 2025-02-28 ENCOUNTER — OFFICE VISIT (OUTPATIENT)
Dept: ORTHOPEDICS | Facility: CLINIC | Age: 70
End: 2025-02-28
Payer: MEDICARE

## 2025-02-28 VITALS — HEIGHT: 71 IN | BODY MASS INDEX: 31.64 KG/M2 | WEIGHT: 226 LBS

## 2025-02-28 DIAGNOSIS — M54.9 BACK PAIN, UNSPECIFIED BACK LOCATION, UNSPECIFIED BACK PAIN LATERALITY, UNSPECIFIED CHRONICITY: ICD-10-CM

## 2025-02-28 DIAGNOSIS — M54.2 NECK PAIN: ICD-10-CM

## 2025-02-28 LAB — LPA SERPL-MCNC: 63 MG/DL (ref 0–30)

## 2025-02-28 PROCEDURE — 1126F AMNT PAIN NOTED NONE PRSNT: CPT | Mod: CPTII,S$GLB,, | Performed by: ORTHOPAEDIC SURGERY

## 2025-02-28 PROCEDURE — 1159F MED LIST DOCD IN RCRD: CPT | Mod: CPTII,S$GLB,, | Performed by: ORTHOPAEDIC SURGERY

## 2025-02-28 PROCEDURE — 4010F ACE/ARB THERAPY RXD/TAKEN: CPT | Mod: CPTII,S$GLB,, | Performed by: ORTHOPAEDIC SURGERY

## 2025-02-28 PROCEDURE — 72070 X-RAY EXAM THORAC SPINE 2VWS: CPT | Mod: 26,,, | Performed by: RADIOLOGY

## 2025-02-28 PROCEDURE — 3008F BODY MASS INDEX DOCD: CPT | Mod: CPTII,S$GLB,, | Performed by: ORTHOPAEDIC SURGERY

## 2025-02-28 PROCEDURE — 72070 X-RAY EXAM THORAC SPINE 2VWS: CPT | Mod: TC,PO

## 2025-02-28 PROCEDURE — 72040 X-RAY EXAM NECK SPINE 2-3 VW: CPT | Mod: 26,,, | Performed by: RADIOLOGY

## 2025-02-28 PROCEDURE — 99999 PR PBB SHADOW E&M-EST. PATIENT-LVL III: CPT | Mod: PBBFAC,,, | Performed by: ORTHOPAEDIC SURGERY

## 2025-02-28 PROCEDURE — 72100 X-RAY EXAM L-S SPINE 2/3 VWS: CPT | Mod: 26,,, | Performed by: RADIOLOGY

## 2025-02-28 PROCEDURE — 3288F FALL RISK ASSESSMENT DOCD: CPT | Mod: CPTII,S$GLB,, | Performed by: ORTHOPAEDIC SURGERY

## 2025-02-28 PROCEDURE — 72040 X-RAY EXAM NECK SPINE 2-3 VW: CPT | Mod: TC,PO

## 2025-02-28 PROCEDURE — 1101F PT FALLS ASSESS-DOCD LE1/YR: CPT | Mod: CPTII,S$GLB,, | Performed by: ORTHOPAEDIC SURGERY

## 2025-02-28 PROCEDURE — 99203 OFFICE O/P NEW LOW 30 MIN: CPT | Mod: S$GLB,,, | Performed by: ORTHOPAEDIC SURGERY

## 2025-02-28 PROCEDURE — 1157F ADVNC CARE PLAN IN RCRD: CPT | Mod: CPTII,S$GLB,, | Performed by: ORTHOPAEDIC SURGERY

## 2025-02-28 PROCEDURE — 72100 X-RAY EXAM L-S SPINE 2/3 VWS: CPT | Mod: TC,PO

## 2025-02-28 NOTE — PROGRESS NOTES
DATE: 2/28/2025  PATIENT: Dani Gastelum Jr.    Supervising Physician: Rigo Davenport M.D.    CHIEF COMPLAINT: neck and back pain    HISTORY:  Dani Gastelum Jr. is a 69 y.o. male  here for initial evaluation of neck and back pain (Back - 0, Leg - 0). The pain has been present intermittently for years, worsening over time. The patient describes the pain as aching in his left upper back and lower back.  The pain is worse with nothing and improved by nothing. There is positive associated numbness and tingling in his feet. There is negative subjective weakness. Prior treatments have included PT for his neck years ago, but no ESIs or surgery.      The patient denies myelopathic symptoms such as handwriting changes or difficulty with buttons/coins/keys. Denies perineal paresthesias, bowel/bladder dysfunction.    PAST MEDICAL/SURGICAL HISTORY:  Past Medical History:   Diagnosis Date    ALLERGIC RHINITIS     Allergy     BPH (benign prostatic hypertrophy)     Thalassemia, beta     Thyroid cancer     Vertigo, intermittent      Past Surgical History:   Procedure Laterality Date    DISSECTION OF NECK Left 10/16/2020    Procedure: DISSECTION, NECK Central;  Surgeon: Una Lobato MD;  Location: [facility];  Service: General;  Laterality: Left;    KNEE SURGERY Right     NASAL SEPTUM SURGERY      SHOULDER SURGERY Right     right rotator cuff    THYROIDECTOMY N/A 10/16/2020    Procedure: THYROIDECTOMY- total;  Surgeon: Una Lobato MD;  Location: [facility];  Service: General;  Laterality: N/A;    TRANSURETHRAL RESECTION OF PROSTATE      VASECTOMY         Medications:   Medications Ordered Prior to Encounter[1]    Social History: Social History[2]    REVIEW OF SYSTEMS:  Constitution: Negative. Negative for chills, fever and night sweats.   Cardiovascular: Negative for chest pain and syncope.   Respiratory: Negative for cough and shortness of breath.   Gastrointestinal: See HPI. Negative for nausea/vomiting. Negative for  "abdominal pain.  Genitourinary: See HPI. Negative for discoloration or dysuria.  Skin: Negative for dry skin, itching and rash.   Hematologic/Lymphatic: Negative for bleeding problem. Does not bruise/bleed easily.   Musculoskeletal: Negative for falls and muscle weakness.   Neurological: See HPI. No seizures.   Endocrine: Negative for polydipsia, polyphagia and polyuria.   Allergic/Immunologic: Negative for hives and persistent infections.     EXAM:  Ht 5' 11" (1.803 m)   Wt 102.5 kg (225 lb 15.5 oz)   BMI 31.52 kg/m²     PHYSICAL EXAMINATION:    General: The patient is a very pleasant 69 y.o. male in no apparent distress, the patient is oriented to person, place and time.  Psych: Normal mood and affect  HEENT: Vision grossly intact, hearing intact to the spoken word.  Lungs: Respirations unlabored.  Gait: Normal station and gait, no difficulty with toe or heel walk.   Skin: Cervical skin and dorsal lumbar skin negative for rashes, lesions, hairy patches and surgical scars.    Range of motion: Cervical and lumbar range of motion is acceptable. There is negative tenderness to palpation of the paracervical muscles.  There is negative lumbar tenderness to palpation.  Spinal Balance: Global saggital and coronal spinal balance acceptable, no significant for scoliosis and kyphosis.  Musculoskeletal: No pain with the range of motion of the bilateral shoulders and elbows. Normal bulk and contour of the bilateral hands.  No pain with the range of motion of the bilateral hips. Mild bilateral trochanteric tenderness to palpation.  Vascular: Bilateral upper and lower extremities warm and well perfused, radial pulses 2+ bilaterally, dorsalis pedis pulses 2+ bilaterally.  Neurological: Normal strength and tone in all major motor groups in the bilateral upper and lower extremities. Normal sensation to light touch in the C5-T1 and L2-S1 dermatomes bilaterally.  Deep tendon reflexes symmetric 2+ in the bilateral upper and lower " extremities.  Negative Inverted Radial Reflex and Recio's bilaterally. Negative Babinski bilaterally. Negative straight leg raise bilaterally.     IMAGING:   Today I personally reviewed AP, Lat and Flex/Ex  upright C-spine films that demonstrate mild degenerative changes.    AP lat thoracic xrays demonstrate mild scoliosis    AP, Lat and Flex/Ex upright lumbar spine films demonstrate mild scoliosis and degenerative changes.     Body mass index is 31.52 kg/m².    Hemoglobin A1C   Date Value Ref Range Status   07/02/2024 5.5 4.0 - 5.6 % Final     Comment:     ADA Screening Guidelines:  5.7-6.4%  Consistent with prediabetes  >or=6.5%  Consistent with diabetes    High levels of fetal hemoglobin interfere with the HbA1C  assay. Heterozygous hemoglobin variants (HbS, HgC, etc)do  not significantly interfere with this assay.   However, presence of multiple variants may affect accuracy.     02/10/2023 5.4 4.0 - 5.6 % Final     Comment:     ADA Screening Guidelines:  5.7-6.4%  Consistent with prediabetes  >or=6.5%  Consistent with diabetes    High levels of fetal hemoglobin interfere with the HbA1C  assay. Heterozygous hemoglobin variants (HbS, HgC, etc)do  not significantly interfere with this assay.   However, presence of multiple variants may affect accuracy.           ASSESSMENT/PLAN:    Dani was seen today for low-back pain, hip pain and back pain.    Diagnoses and all orders for this visit:    Back pain, unspecified back location, unspecified back pain laterality, unspecified chronicity  -     Ambulatory referral[facility]ult [facility]    Neck pain  -     Ambulatory referral/consult to [facility]        Today we discussed at length all of the different treatment options including anti-inflammatories, acetaminophen, rest, ice, heat, physical therapy including strengthening and stretching exercises, home exercises, ROM, aerobic conditioning, aqua therapy, other modalities including ultrasound,  massage, and dry needling, epidural steroid injections and finally surgical intervention.                 [1]   Current Outpatient Medications on File Prior to Visit   Medication Sig Dispense Refill    amLODIPine (NORVASC) 5 MG tablet Take 1 tablet (5 mg total) by mouth once daily. 90 tablet 3    aspirin 81 MG Chew Take 81 mg by mouth once daily.      co-enzyme Q-10 30 mg capsule Take 30 mg by mouth once daily.      dorzolamide-timolol 2-0.5% (COSOPT) 22.3-6.8 mg/mL ophthalmic solution INSTILL 1 DROP IN BOTH EYES TWICE DAILY AS DIRECTED      fish oil-omega-3 fatty acids 300-1,000 mg capsule Take 2 g by mouth once daily.      FOLIC ACID/MV,FE,OTHER MIN (CENTRUM ORAL) Take 1 tablet by mouth once daily.      glucosamine-chondroitin 500-400 mg tablet Take 1 tablet by mouth 3 (three) times daily. Pt taking one pill once a day.      latanoprost 0.005 % ophthalmic solution INT 1 GTT IN OU QD HS  3    levothyroxine (SYNTHROID) 150 MCG tablet Take 1 tablet (150 mcg total) by mouth before breakfast. 90 tablet 3    losartan (COZAAR) 100 MG tablet TAKE 1 TABLET(100 MG) BY MOUTH EVERY DAY 90 tablet 2    rosuvastatin (CRESTOR) 40 MG Tab Take 1 tablet (40 mg total) by mouth every evening. 90 tablet 3    VOLTAREN 1 % Gel as needed.       No current facility-administered medications on file prior to visit.   [2]   Social History  Socioeconomic History    Marital status:    Occupational History    Occupation:      Employer: [employer]   Tobacco Use    Smoking status: Never     Passive exposure: Never    Smokeless tobacco: Never   Substance and Sexual Activity    Alcohol use: No    Drug use: No    Sexual activity: Yes     Partners: Female     Birth control/protection: None

## 2025-03-01 ENCOUNTER — RESULTS FOLLOW-UP (OUTPATIENT)
Dept: CARDIOLOGY | Facility: CLINIC | Age: 70
End: 2025-03-01

## 2025-03-01 DIAGNOSIS — E78.2 MIXED HYPERLIPIDEMIA: Primary | ICD-10-CM

## 2025-03-01 RX ORDER — EZETIMIBE 10 MG/1
10 TABLET ORAL DAILY
Qty: 90 TABLET | Refills: 3 | Status: SHIPPED | OUTPATIENT
Start: 2025-03-01 | End: 2026-03-01

## 2025-03-03 ENCOUNTER — CLINICAL SUPPORT (OUTPATIENT)
Dept: OTOLARYNGOLOGY | Facility: CLINIC | Age: 70
End: 2025-03-03
Payer: MEDICARE

## 2025-03-03 ENCOUNTER — OFFICE VISIT (OUTPATIENT)
Dept: OTOLARYNGOLOGY | Facility: CLINIC | Age: 70
End: 2025-03-03
Payer: MEDICARE

## 2025-03-03 VITALS
BODY MASS INDEX: 31.26 KG/M2 | HEART RATE: 60 BPM | WEIGHT: 224.13 LBS | SYSTOLIC BLOOD PRESSURE: 120 MMHG | DIASTOLIC BLOOD PRESSURE: 70 MMHG

## 2025-03-03 DIAGNOSIS — H69.90 DYSFUNCTION OF EUSTACHIAN TUBE, UNSPECIFIED LATERALITY: ICD-10-CM

## 2025-03-03 DIAGNOSIS — H93.13 TINNITUS OF BOTH EARS: Chronic | ICD-10-CM

## 2025-03-03 DIAGNOSIS — J31.0 CHRONIC RHINITIS: Chronic | ICD-10-CM

## 2025-03-03 DIAGNOSIS — H90.3 SENSORINEURAL HEARING LOSS (SNHL) OF BOTH EARS: Primary | ICD-10-CM

## 2025-03-03 DIAGNOSIS — H90.3 SENSORINEURAL HEARING LOSS (SNHL) OF BOTH EARS: Primary | Chronic | ICD-10-CM

## 2025-03-03 DIAGNOSIS — H93.13 TINNITUS OF BOTH EARS: ICD-10-CM

## 2025-03-03 PROCEDURE — 3008F BODY MASS INDEX DOCD: CPT | Mod: CPTII,S$GLB,, | Performed by: OTOLARYNGOLOGY

## 2025-03-03 PROCEDURE — 1160F RVW MEDS BY RX/DR IN RCRD: CPT | Mod: CPTII,S$GLB,, | Performed by: OTOLARYNGOLOGY

## 2025-03-03 PROCEDURE — 1157F ADVNC CARE PLAN IN RCRD: CPT | Mod: CPTII,S$GLB,, | Performed by: OTOLARYNGOLOGY

## 2025-03-03 PROCEDURE — 99999 PR PBB SHADOW E&M-EST. PATIENT-LVL III: CPT | Mod: PBBFAC,,, | Performed by: OTOLARYNGOLOGY

## 2025-03-03 PROCEDURE — 92567 TYMPANOMETRY: CPT | Mod: S$GLB,,,

## 2025-03-03 PROCEDURE — 92557 COMPREHENSIVE HEARING TEST: CPT | Mod: S$GLB,,,

## 2025-03-03 PROCEDURE — 3288F FALL RISK ASSESSMENT DOCD: CPT | Mod: CPTII,S$GLB,, | Performed by: OTOLARYNGOLOGY

## 2025-03-03 PROCEDURE — 3078F DIAST BP <80 MM HG: CPT | Mod: CPTII,S$GLB,, | Performed by: OTOLARYNGOLOGY

## 2025-03-03 PROCEDURE — 1159F MED LIST DOCD IN RCRD: CPT | Mod: CPTII,S$GLB,, | Performed by: OTOLARYNGOLOGY

## 2025-03-03 PROCEDURE — 1126F AMNT PAIN NOTED NONE PRSNT: CPT | Mod: CPTII,S$GLB,, | Performed by: OTOLARYNGOLOGY

## 2025-03-03 PROCEDURE — 1101F PT FALLS ASSESS-DOCD LE1/YR: CPT | Mod: CPTII,S$GLB,, | Performed by: OTOLARYNGOLOGY

## 2025-03-03 PROCEDURE — 3074F SYST BP LT 130 MM HG: CPT | Mod: CPTII,S$GLB,, | Performed by: OTOLARYNGOLOGY

## 2025-03-03 PROCEDURE — 99214 OFFICE O/P EST MOD 30 MIN: CPT | Mod: S$GLB,,, | Performed by: OTOLARYNGOLOGY

## 2025-03-03 PROCEDURE — 4010F ACE/ARB THERAPY RXD/TAKEN: CPT | Mod: CPTII,S$GLB,, | Performed by: OTOLARYNGOLOGY

## 2025-03-03 NOTE — PROGRESS NOTES
Chief Complaint   Patient presents with    Hearing Loss      annual check up  /pt has been feeling fine         HPI:  Patient is a 69 y.o. male who has previously seen me for hearing loss and tinnitus.      Since the last visit, the patient reports continued hearing loss that has been stable.  He wears hearing aids from outside audiologist.  He still complains of tinnitus frequently, but this is also stable.    Active Ambulatory Problems     Diagnosis Date Noted    Chronic rhinitis     Mixed hyperlipidemia     Vertigo, intermittent     Joint pain 07/31/2012    Localized primary osteoarthritis of lower leg 06/04/2013    Medial meniscus tear 06/04/2013    Essential hypertension 06/05/2013    S/P knee surgery, medial menisectomy 07/01/2013    Primary localized osteoarthrosis, lower leg 08/27/2015    Thalassemia, beta     Agatston coronary artery calcium score  of 645, 92nd percentile 03/13/2017    Neck pain 11/06/2019    Low back pain 11/06/2019    Cervical spondylosis with radiculopathy 01/03/2020    Acquired hypothyroidism 11/20/2020    Tinnitus of right ear 03/24/2022    Glaucoma 05/02/2022     Resolved Ambulatory Problems     Diagnosis Date Noted    BPH (benign prostatic hypertrophy)     S/P TURP 09/04/2014    Chronic LBP 05/15/2015    DDD (degenerative disc disease), thoracic 05/15/2015    Prostate cancer 02/29/2016    Heart palpitations 10/04/2016    Multinodular goiter 01/30/2020    Thyroid cancer 04/01/2020    Papillary thyroid carcinoma 08/17/2020    History of prostate cancer 07/26/2019     Past Medical History:   Diagnosis Date    ALLERGIC RHINITIS     Allergy     BPH (benign prostatic hypertrophy)        Review of Systems  General: negative for chills, fever or weight loss  Psychological: negative for mood changes or depression  Ophthalmic: negative for blurry vision, photophobia or eye pain  ENT: see HPI  Respiratory: no cough, shortness of breath, or wheezing  Cardiovascular: no chest pain or dyspnea on  exertion  Gastrointestinal: no abdominal pain, change in bowel habits, or black/ bloody stools  Musculoskeletal: negative for gait disturbance or muscular weakness  Neurological: no syncope or seizures; no ataxia  Dermatological: negative for pruritis, rash and jaundice  Hematologic/lymphatic: no easy bruising, no new adenopathy    Physical Exam     Vitals:    03/03/25 1344   BP: 120/70   Pulse: 60         Constitutional:   He is oriented to person, place, and time. Vital signs are normal. He appears well-developed and well-nourished. He appears alert. He is cooperative. Normal speech.      Head:  Normocephalic and atraumatic. Salivary glands normal.  Facial strength is normal.      Ears:    Right Ear: Tympanic membrane is not perforated and not erythematous. Tympanic membrane mobility is abnormal. No middle ear effusion.   Left Ear: Tympanic membrane is not perforated and not erythematous.  No middle ear effusion.     Nose:  Mucosal edema present. No rhinorrhea, septal deviation or polyps. Turbinates abnormal and turbinate hypertrophy (3+, boggy, congested mucosa).  Right sinus exhibits no maxillary sinus tenderness and no frontal sinus tenderness. Left sinus exhibits no maxillary sinus tenderness and no frontal sinus tenderness.     Mouth/Throat  Oropharynx clear and moist without lesions or asymmetry, lips, teeth, and gums normal and oropharynx normal. No mucous membrane lesions. No oropharyngeal exudate, posterior oropharyngeal edema or posterior oropharyngeal erythema. Mirror exam not performed due to patient tolerance.  Mirror exam not performed due to patient tolerance.      Neck:  Neck normal without thyromegaly masses, asymmetry, normal tracheal structure, crepitus, and tenderness, thyroid normal, trachea normal, phonation normal, full range of motion with neck supple and no adenopathy. No JVD present. Carotid bruit is not present. No thyroid mass and no thyromegaly present.     He has no cervical  adenopathy.     Cardiovascular:    Normal rate, regular rhythm and rate and rhythm, heart sounds, and pulses normal.              Pulmonary/Chest:   Effort and breath sounds normal.     Psychiatric:   He has a normal mood and affect. His speech is normal and behavior is normal.     Neurological:   He is alert and oriented to person, place, and time. He has neurological normal, alert and oriented. No cranial nerve deficit.     Skin:   No abrasions, lacerations, lesions, or rashes.         Audiogram: Interpreted by me and reviewed with the patient today.  Stable hearing, SNHL mild sloping to severe.  Tymps type A on left and type C right.                 Assessment/Plan:      ICD-10-CM ICD-9-CM    1. Sensorineural hearing loss (SNHL) of both ears  H90.3 389.18       2. Tinnitus of both ears  H93.13 388.30       3. Chronic rhinitis  J31.0 472.0       4. Dysfunction of Eustachian tube, unspecified laterality  H69.90 381.81             Continue OTC meds for rhinitis/ETD.  Otologic valsava.   Cont hearing aid use and adjustment with outside audiology.  Gave info about tinnitus.     Annual audiograms and hearing protection in noise.      Glenda Macdonald MD  [facility]

## 2025-03-03 NOTE — PATIENT INSTRUCTIONS
Audiogram was reviewed in detail with the patient, and they understand their hearing loss.    Continue hearing aid use.   I recommend annual audiograms as well as hearing protection in noise.    WHAT IS TINNITUS?  The perception of sound heard in one or both sides of the head. Some refer to it as a ringing, noise, buzzing, roaring, clicking, wooshing, crickets, heartbeat, music, or other noises. There are varying levels of severity. The tinnitus may be constant or periodic. Common complaints include difficulty sleeping, struggling to understand other's speech, depression, and problems focusing.  Tinnitus is a symptom, not a disease. It affects 10-15% of adults in the [place].    WHAT CAN YOU DO?  You should seek medical care if you suspect you have tinnitus and tell your physician if your symptoms are affecting your daily life. Tinnitus may cause anxiety, depression, insomnia, negative emotions, and withdrawal. It's okay to seek help as your symptoms may be managed, and common.    HOW IS TINNITUS DIAGNOSED?  A doctor can diagnose tinnitus after a physical examination and interview. The examination may rule out conditions causing the tinnitus such as wax impaction or fluid behind the eardrum. Tinnitus may also be related to hearing loss, especially hearing loss from frequent noise exposure. A hearing test may be performed if you are experiencing tinnitus.    TREATMENT FOR TINNITUS?  Treatment may improve on its own but if it doesn't there are several ways to manage your symptoms although there is no cure. Possible treatment options include amplification (hearing aids), sound therapy (maskers,white noise,etc.), TMJ treatment, cognitive behavioral therapy, relaxation exercises, and managing your medications.  There is not enough evidence to suggest that over the counter products help patients with tinnitus. Acupuncture can neither be recommended or discouraged due to lack of evidence as well.    Source: American  Academy of Otolaryngology-Head And Neck Surgery    ORGANIZATIONS AND LINKS FOR TINNITUS AND HEARING LOSS    American Academy of Audiology      www.audiology.org  American Tinnitus Associate        www.kem.org  American Academy of Otolaryngology, Head & Neck Surgery www.entnet.org  American Auditory Society     www.amauditorysoc.org  Better Hearing [place]      www.betterhearing.org  EarHelp        www.earhelp.co.uk/  Hearing Education and Awareness for [place] (HEAR)  www.hearnet.[employer]  Hearing Loss Association of [place]    www.hearingloss.com   Hear-It        www.hear-it.org  Healthy Hearing       www.healthyhearing.[employer]   Sight and Hearing Association     www.sightandhearing.org

## 2025-03-03 NOTE — PROGRESS NOTES
"Dani Gastelum Jr., a 69 y.o. male was seen today in the clinic for annual audiologic evaluation. The patient reports no significant change in hearing perception over the past year.  Previous hearing evaluation revealed "mild sloping to moderated sensorineural hearing loss for the left ear and a mild sloping to severe sensorineural hearing loss for the right ear". He reports tinnitus, primarily of the right ear (occasionally left). Mr. Gastelum reports inconsistent use of MATTHEW hearing aids.    Pure tone testing revealed normal sloping to severe sensorineural hearing loss in the right ear and normal sloping to mild sensorineural hearing loss in the left ear. Speech reception thresholds were obtained at 25 dBHL in the right ear and 20 dBHL in the left ear. Speech discrimination scores were 88% in the right ear and 92% in the left ear. Tympanometry revealed Type A tympanogram in the right ear and Type Ad tympanogram in the left ear.    Recommendations:  Otologic evaluation  Annual audiologic evaluation  Hearing protection in noise  Continued use of amplification with attempt to increase wear time each day                  "

## 2025-03-24 DIAGNOSIS — Z00.00 ENCOUNTER FOR MEDICARE ANNUAL WELLNESS EXAM: ICD-10-CM

## 2025-03-31 NOTE — TELEPHONE ENCOUNTER
Refill Routing Note   Medication(s) are not appropriate for processing by [facility] for the following reason(s):        Patient not seen by provider within 15 months    [facility] action(s):  Defer               Appointments  past 12m or future 3m with PCP    Date Provider   Last Visit   11/6/2023 Columba Carrizales MD   Next Visit   Visit date not found Columba Carrizales MD   ED visits in past 90 days: 0        Note composed:5:40 AM 03/31/2025

## 2025-04-01 RX ORDER — ROSUVASTATIN CALCIUM 40 MG/1
40 TABLET, COATED ORAL NIGHTLY
Qty: 90 TABLET | Refills: 0 | Status: SHIPPED | OUTPATIENT
Start: 2025-04-01

## 2025-04-07 NOTE — TELEPHONE ENCOUNTER
Refill Routing Note   Medication(s) are not appropriate for processing by [facility] for the following reason(s):        Patient not seen by provider within 15 months    [facility] action(s):  Defer             Appointments  past 12m or future 3m with PCP    Date Provider   Last Visit   11/6/2023 Columba Carrizales MD   Next Visit   Visit date not found Columba Carrizales MD   ED visits in past 90 days: 0        Note composed:6:01 PM 04/07/2025

## 2025-04-08 RX ORDER — AMLODIPINE BESYLATE 5 MG/1
5 TABLET ORAL
Qty: 90 TABLET | Refills: 3 | Status: SHIPPED | OUTPATIENT
Start: 2025-04-08

## 2025-05-22 ENCOUNTER — OFFICE VISIT (OUTPATIENT)
Dept: URGENT CARE | Facility: CLINIC | Age: 70
End: 2025-05-22
Payer: MEDICARE

## 2025-05-22 VITALS
BODY MASS INDEX: 31.36 KG/M2 | SYSTOLIC BLOOD PRESSURE: 122 MMHG | WEIGHT: 224 LBS | DIASTOLIC BLOOD PRESSURE: 73 MMHG | HEART RATE: 62 BPM | HEIGHT: 71 IN | TEMPERATURE: 98 F | RESPIRATION RATE: 18 BRPM | OXYGEN SATURATION: 96 %

## 2025-05-22 DIAGNOSIS — H65.193 ACUTE EFFUSION OF BOTH MIDDLE EARS: ICD-10-CM

## 2025-05-22 DIAGNOSIS — J06.9 VIRAL URI WITH COUGH: Primary | ICD-10-CM

## 2025-05-22 LAB
CTP QC/QA: YES
CTP QC/QA: YES
POC MOLECULAR INFLUENZA A AGN: NEGATIVE
POC MOLECULAR INFLUENZA B AGN: NEGATIVE
SARS-COV+SARS-COV-2 AG RESP QL IA.RAPID: NEGATIVE

## 2025-05-22 PROCEDURE — 87502 INFLUENZA DNA AMP PROBE: CPT | Mod: QW,S$GLB,, | Performed by: PHYSICIAN ASSISTANT

## 2025-05-22 RX ORDER — BENZONATATE 200 MG/1
200 CAPSULE ORAL 3 TIMES DAILY PRN
Qty: 30 CAPSULE | Refills: 0 | Status: SHIPPED | OUTPATIENT
Start: 2025-05-22 | End: 2025-06-01

## 2025-05-22 RX ORDER — CETIRIZINE HYDROCHLORIDE 10 MG/1
10 TABLET ORAL DAILY
Qty: 7 TABLET | Refills: 0 | Status: SHIPPED | OUTPATIENT
Start: 2025-05-22 | End: 2025-05-29

## 2025-05-22 NOTE — PROGRESS NOTES
"Subjective:      Patient ID: Dani Gastelum Jr. is a 69 y.o. male.    Vitals:  height is 5' 11" (1.803 m) and weight is 101.6 kg (224 lb). His oral temperature is 98.1 °F (36.7 °C). His blood pressure is 122/73 and his pulse is 62. His respiration is 18 and oxygen saturation is 96%.     Chief Complaint: Cough    Patient reports a sometimes productive cough that started 3 days ago.Patient took OTC mucinex for his symptoms with no relief. NO sick contacts. Pt did go to the gym yesterday.     Cough  This is a new problem. The current episode started in the past 7 days. The problem has been gradually worsening. The problem occurs every few minutes. The cough is Productive of sputum (sometimes). Associated symptoms include ear pain, headaches, myalgias, nasal congestion, postnasal drip, rhinorrhea and a sore throat (better now). Pertinent negatives include no chest pain, chills, fever, hemoptysis, rash or shortness of breath. Nothing aggravates the symptoms. Treatments tried: OTC expectorant. The treatment provided no relief. His past medical history is significant for asthma (as a child). There is no history of bronchitis.       Constitution: Positive for sweating and fatigue. Negative for appetite change, chills and fever.   HENT:  Positive for ear pain, congestion (nasal), postnasal drip and sore throat (better now). Negative for ear discharge, tinnitus, hearing loss, drooling, mouth sores, tongue pain, tongue lesion, sinus pain, sinus pressure, trouble swallowing and voice change.    Neck: Negative for neck pain and neck stiffness.   Cardiovascular:  Negative for chest pain.   Eyes:  Negative for eye discharge and eye itching.   Respiratory:  Positive for chest tightness, cough and sputum production. Negative for bloody sputum and shortness of breath.         Chest tightness only with coughing   Gastrointestinal:  Negative for abdominal pain, nausea, vomiting, constipation and diarrhea.   Musculoskeletal:  Positive " for muscle ache.   Skin:  Negative for rash.   Neurological:  Positive for headaches. Negative for dizziness.      Past Medical History:   Diagnosis Date    ALLERGIC RHINITIS     Allergy     BPH (benign prostatic hypertrophy)     Thalassemia, beta     Thyroid cancer     Vertigo, intermittent        Past Surgical History:   Procedure Laterality Date    DISSECTION OF NECK Left 10/16/2020    Procedure: DISSECTION, NECK Central;  Surgeon: Una Lobato MD;  Location: [facility];  Service: General;  Laterality: Left;    KNEE SURGERY Right     NASAL SEPTUM SURGERY      SHOULDER SURGERY Right     right rotator cuff    THYROIDECTOMY N/A 10/16/2020    Procedure: THYROIDECTOMY- total;  Surgeon: Una Lobato MD;  Location: [facility];  Service: General;  Laterality: N/A;    TRANSURETHRAL RESECTION OF PROSTATE      VASECTOMY         Family History   Problem Relation Name Age of Onset    Hyperlipidemia Mother      Gout Mother      Hypertension Mother      Hyperlipidemia Father      Hypertension Father      Kidney disease Father      Cancer Father          Lung and Renal Cancer    Cancer Brother          myeloma    Cancer Brother          prostate    Heart failure Maternal Grandmother      Heart failure Paternal Grandfather      Heart attack Neg Hx      Heart disease Neg Hx      Stroke Neg Hx         Social History     Socioeconomic History    Marital status:    Occupational History    Occupation:      Employer: [employer]   Tobacco Use    Smoking status: Never     Passive exposure: Never    Smokeless tobacco: Never   Substance and Sexual Activity    Alcohol use: No    Drug use: No    Sexual activity: Yes     Partners: Female     Birth control/protection: None       Current Medications[1]    Review of patient's allergies indicates:  No Known Allergies    Objective:     Physical Exam   Constitutional:  Non-toxic appearance. He does not appear ill. No distress.   HENT:   Head: Normocephalic and  atraumatic.   Ears:   Right Ear: External ear and ear canal normal. Tympanic membrane is not injected, not erythematous, not retracted and not bulging. A middle ear effusion is present. no impacted cerumen  Left Ear: External ear and ear canal normal. Tympanic membrane is not injected, not erythematous, not retracted and not bulging. A middle ear effusion is present. no impacted cerumen  Nose: Rhinorrhea present. No congestion. Right sinus exhibits no maxillary sinus tenderness and no frontal sinus tenderness. Left sinus exhibits no maxillary sinus tenderness and no frontal sinus tenderness.   Mouth/Throat: Mucous membranes are moist. No oropharyngeal exudate or posterior oropharyngeal erythema. Tonsils are 1+ on the right. Tonsils are 1+ on the left. No tonsillar exudate.   Eyes: Conjunctivae are normal. Right eye exhibits no discharge. Left eye exhibits no discharge.   Neck: Neck supple.   Cardiovascular: Normal rate, regular rhythm and normal heart sounds.   No murmur heard.Exam reveals no gallop and no friction rub.   Pulmonary/Chest: Effort normal and breath sounds normal. No stridor. No respiratory distress. He has no wheezes. He has no rhonchi. He has no rales.   Abdominal: Normal appearance.   Musculoskeletal:      Cervical back: He exhibits no tenderness.   Lymphadenopathy:     He has no cervical adenopathy.   Neurological: He is alert.   Skin: Skin is warm, diaphoretic and no rash.   Psychiatric: His behavior is normal. Mood normal.   Nursing note and vitals reviewed.    Results for orders placed or performed in visit on 05/22/25   POCT Influenza A/B MOLECULAR    Collection Time: 05/22/25 10:37 AM   Result Value Ref Range    POC Molecular Influenza A Ag Negative Negative    POC Molecular Influenza B Ag Negative Negative     Acceptable Yes    SARS Coronavirus 2 Antigen, POCT Manual Read    Collection Time: 05/22/25 10:37 AM   Result Value Ref Range    SARS Coronavirus 2 Antigen Negative  Negative, Presumptive Negative     Acceptable Yes        Assessment:     1. Viral URI with cough    2. Acute effusion of both middle ears        Plan:       Viral URI with cough  -     POCT Influenza A/B MOLECULAR  -     SARS Coronavirus 2 Antigen, POCT Manual Read  -     cetirizine (ZYRTEC) 10 MG tablet; Take 1 tablet (10 mg total) by mouth once daily. for 7 days  Dispense: 7 tablet; Refill: 0  -     benzonatate (TESSALON) 200 MG capsule; Take 1 capsule (200 mg total) by mouth 3 (three) times daily as needed for Cough.  Dispense: 30 capsule; Refill: 0    Acute effusion of both middle ears  -     cetirizine (ZYRTEC) 10 MG tablet; Take 1 tablet (10 mg total) by mouth once daily. for 7 days  Dispense: 7 tablet; Refill: 0      Results reviewed  I have reviewed the patient chart and pertinent past imaging/labs.       Patient is nontoxic and stable       Patient Instructions                                                            URI   If your condition worsens or fails to improve we recommend that you receive another evaluation at the urgent care/ER immediately or contact your PCP to discuss your concerns. You must understand that you've received an urgent care treatment only and that you may be released before all your medical problems are known or treated. You the patient will arrange for [facility] care as instructed.     If we discussed that I think your illness is viral, it will not respond to antibiotics and will last 10-14 days.     Retest for covid at home in 48 hours  Use Zyrtec as directed for fluid behind the ears  Stop Mucinex.  Use Tessalon as needed for cough.    Use home Flonase for postnasal drip and nasal congestion  Rest and fluids are also important.     Salt water gargles, warm tea with honey and chloraseptic spray as needed for sore throat.   Tylenol or ibuprofen can also be used as directed for pain unless you have an allergy to them or medical condition such as stomach ulcers,  kidney or liver disease or blood thinners etc for which you should not be taking these type of medications.                [1]   Current Outpatient Medications   Medication Sig Dispense Refill    amLODIPine (NORVASC) 5 MG tablet TAKE 1 TABLET(5 MG) BY MOUTH DAILY 90 tablet 3    aspirin 81 MG Chew Take 81 mg by mouth once daily.      co-enzyme Q-10 30 mg capsule Take 30 mg by mouth once daily.      dorzolamide-timolol 2-0.5% (COSOPT) 22.3-6.8 mg/mL ophthalmic solution INSTILL 1 DROP IN BOTH EYES TWICE DAILY AS DIRECTED      ezetimibe (ZETIA) 10 mg tablet Take 1 tablet (10 mg total) by mouth once daily. 90 tablet 3    fish oil-omega-3 fatty acids 300-1,000 mg capsule Take 2 g by mouth once daily.      FOLIC ACID/MV,FE,OTHER MIN (CENTRUM ORAL) Take 1 tablet by mouth once daily.      glucosamine-chondroitin 500-400 mg tablet Take 1 tablet by mouth 3 (three) times daily. Pt taking one pill once a day.      latanoprost 0.005 % ophthalmic solution INT 1 GTT IN OU QD HS  3    levothyroxine (SYNTHROID) 150 MCG tablet Take 1 tablet (150 mcg total) by mouth before breakfast. 90 tablet 3    losartan (COZAAR) 100 MG tablet TAKE 1 TABLET(100 MG) BY MOUTH EVERY DAY 90 tablet 2    rosuvastatin (CRESTOR) 40 MG Tab TAKE 1 TABLET(40 MG) BY MOUTH EVERY EVENING 90 tablet 0    VOLTAREN 1 % Gel as needed.       No current facility-administered medications for this visit.

## 2025-05-22 NOTE — PATIENT INSTRUCTIONS
URI   If your condition worsens or fails to improve we recommend that you receive another evaluation at the urgent care/ER immediately or contact your PCP to discuss your concerns. You must understand that you've received an urgent care treatment only and that you may be released before all your medical problems are known or treated. You the patient will arrange for [facility] care as instructed.     If we discussed that I think your illness is viral, it will not respond to antibiotics and will last 10-14 days.     Retest for covid at home in 48 hours  Use Zyrtec as directed for fluid behind the ears  Stop Mucinex.  Use Tessalon as needed for cough.    Use home Flonase for postnasal drip and nasal congestion  Rest and fluids are also important.     Salt water gargles, warm tea with honey and chloraseptic spray as needed for sore throat.   Tylenol or ibuprofen can also be used as directed for pain unless you have an allergy to them or medical condition such as stomach ulcers, kidney or liver disease or blood thinners etc for which you should not be taking these type of medications.

## 2025-05-28 ENCOUNTER — OFFICE VISIT (OUTPATIENT)
Dept: INTERNAL MEDICINE | Facility: CLINIC | Age: 70
End: 2025-05-28
Payer: MEDICARE

## 2025-05-28 VITALS
SYSTOLIC BLOOD PRESSURE: 114 MMHG | HEART RATE: 65 BPM | OXYGEN SATURATION: 97 % | DIASTOLIC BLOOD PRESSURE: 72 MMHG | TEMPERATURE: 97 F | BODY MASS INDEX: 31.05 KG/M2 | RESPIRATION RATE: 14 BRPM | HEIGHT: 71 IN | WEIGHT: 221.81 LBS

## 2025-05-28 DIAGNOSIS — D56.1 THALASSEMIA, BETA: ICD-10-CM

## 2025-05-28 DIAGNOSIS — E66.09 CLASS 1 OBESITY DUE TO EXCESS CALORIES WITH SERIOUS COMORBIDITY AND BODY MASS INDEX (BMI) OF 30.0 TO 30.9 IN ADULT: ICD-10-CM

## 2025-05-28 DIAGNOSIS — M47.22 CERVICAL SPONDYLOSIS WITH RADICULOPATHY: ICD-10-CM

## 2025-05-28 DIAGNOSIS — H40.9 GLAUCOMA, UNSPECIFIED GLAUCOMA TYPE, UNSPECIFIED LATERALITY: ICD-10-CM

## 2025-05-28 DIAGNOSIS — I10 ESSENTIAL HYPERTENSION: Chronic | ICD-10-CM

## 2025-05-28 DIAGNOSIS — E78.2 MIXED HYPERLIPIDEMIA: ICD-10-CM

## 2025-05-28 DIAGNOSIS — Z85.850 HISTORY OF THYROID CANCER: ICD-10-CM

## 2025-05-28 DIAGNOSIS — J31.0 CHRONIC RHINITIS: Chronic | ICD-10-CM

## 2025-05-28 DIAGNOSIS — E03.9 ACQUIRED HYPOTHYROIDISM: ICD-10-CM

## 2025-05-28 DIAGNOSIS — M25.50 ARTHRALGIA, UNSPECIFIED JOINT: Chronic | ICD-10-CM

## 2025-05-28 DIAGNOSIS — Z00.00 ENCOUNTER FOR MEDICARE ANNUAL WELLNESS EXAM: Primary | ICD-10-CM

## 2025-05-28 DIAGNOSIS — E66.811 CLASS 1 OBESITY DUE TO EXCESS CALORIES WITH SERIOUS COMORBIDITY AND BODY MASS INDEX (BMI) OF 30.0 TO 30.9 IN ADULT: ICD-10-CM

## 2025-05-28 PROCEDURE — 99999 PR PBB SHADOW E&M-EST. PATIENT-LVL V: CPT | Mod: PBBFAC,,,

## 2025-05-28 NOTE — PROGRESS NOTES
"  Dani Gastelum presented for an initial Medicare AWV today. The following components were reviewed and updated:    Medical history  Family History  Social history  Allergies and Current Medications  Health Risk Assessment  Health Maintenance  Care Team    **See Completed Assessments for Annual Wellness visit with in the encounter summary    The following assessments were completed:  Depression Screening  Cognitive function Screening  Timed Get Up Test  Whisper Test      Opioid documentation:      Patient does not have a current opioid prescription.          Vitals:    05/28/25 1108   BP: 114/72   Patient Position: Sitting   Pulse: 65   Resp: 14   Temp: 96.9 °F (36.1 °C)   SpO2: 97%   Weight: 100.6 kg (221 lb 12.5 oz)   Height: 5' 11" (1.803 m)     Body mass index is 30.93 kg/m².       Physical Exam  Vitals reviewed.   Constitutional:       General: He is awake. He is not in acute distress.     Appearance: Normal appearance. He is well-developed. He is not ill-appearing, toxic-appearing or diaphoretic.   HENT:      Head: Normocephalic and atraumatic.      Right Ear: External ear normal.      Left Ear: External ear normal.      Nose: Nose normal.      Mouth/Throat:      Mouth: Mucous membranes are moist.      Pharynx: Oropharynx is clear.   Eyes:      General: No scleral icterus.     Conjunctiva/sclera: Conjunctivae normal.      Pupils: Pupils are equal, round, and reactive to light.   Cardiovascular:      Rate and Rhythm: Normal rate and regular rhythm.      Pulses: Normal pulses.           Radial pulses are 2+ on the right side and 2+ on the left side.        Dorsalis pedis pulses are 2+ on the right side and 2+ on the left side.      Heart sounds: Normal heart sounds.   Pulmonary:      Effort: Pulmonary effort is normal.      Breath sounds: Normal breath sounds.   Abdominal:      General: Bowel sounds are normal.      Palpations: Abdomen is soft.   Musculoskeletal:         General: Normal range of motion.      " Cervical back: Normal range of motion and neck supple.      Right lower leg: No edema.      Left lower leg: No edema.   Skin:     General: Skin is warm and dry.   Neurological:      Mental Status: He is alert and oriented to person, place, and time. Mental status is at baseline.      GCS: GCS eye subscore is 4. GCS verbal subscore is 5. GCS motor subscore is 6.   Psychiatric:         Behavior: Behavior is cooperative.           Diagnoses and health risks identified today and associated recommendations/orders:  1. Encounter for Medicare annual wellness exam  - Chart reviewed. Problem list updated. Discussed current medical diagnosis, current medications, medical/surgical/family/social history; updated provider list; documented vital signs; identified any cognitive impairment; and updated risk factor list. Addressed any outstanding health maintenance. Provided patient with personalized health advice. Continue to follow up with PCP and any specialists.    - Referral to Enhanced Annual Wellness Visit (eAWV) W+1    2. Essential hypertension  Chronic, stable.  Continue amlodipine and losartan as prescribed.  Managed per PCP.    3. Mixed hyperlipidemia  Chronic, stable.  Continue daily baby aspirin and rosuvastatin as prescribed.  Managed per Cardiology.    4. Thalassemia, beta  Chronic, stable.  Managed per PCP.    5. Acquired hypothyroidism  Chronic, stable.  Patient is status post thyroidectomy secondary to thyroid cancer.  Continue Synthroid as prescribed.  Managed per Endocrinology.    6. Chronic rhinitis  Chronic, stable.  Continue Zyrtec as prescribed.  Managed per PCP.    7. Arthralgia, unspecified joint  8. Cervical spondylosis with radiculopathy  Chronic, stable.  Currently not requiring medication.  Managed per Orthopedics.    9. Glaucoma, unspecified glaucoma type, unspecified laterality  Chronic, stable.  Continue eye drop regimen as prescribed.  Managed per Ophthalmology.    10. Class 1 obesity due to excess  calories with serious comorbidity and body mass index (BMI) of 30.0 to 30.9 in adult  Chronic, stable.  Encouraged patient to maintain active lifestyle and consider implementing Mediterranean style diet.  Managed per PCP.      Provided Dani with a 5-10 year written screening schedule and personal prevention plan. Recommendations were developed using the USPSTF age appropriate recommendations. Education, counseling, and referrals were provided as needed.  After Visit Summary printed and given to patient which includes a list of additional screenings\tests needed.    Return to clinic in 1 year for next enhance annual wellness exam.      DARCIE Ca-GLADYS offered to discuss advanced care planning, including how to pick a person who would make decisions for you if you were unable to make them for yourself, called a health care power of , and what kind of decisions you might make such as use of life sustaining treatments such as ventilators and tube feeding when faced with a life limiting illness recorded on a living will that they will need to know. (How you want to be cared for as you near the end of your natural life)     X  Patient has advanced directives on file, which we reviewed, and they do not wish to make changes.

## 2025-05-28 NOTE — PATIENT INSTRUCTIONS
Counseling and Referral of Other Preventative  (Italic type indicates deductible and co-insurance are waived)    Patient Name: Dani Gastelum  Today's Date: 5/28/2025    Health Maintenance       Date Due Completion Date    RSV Vaccine (Age 60+ and Pregnant patients) (1 - Risk 60-74 years 1-dose series) Never done ---    TETANUS VACCINE 09/08/2025 9/8/2015    Aspirin/Antiplatelet Therapy 05/28/2026 5/28/2025    Colorectal Cancer Screening 12/13/2027 12/13/2017    Override on 8/9/2010: Done    Lipid Panel 01/09/2030 1/9/2025        No orders of the defined types were placed in this encounter.      The following information is provided to all patients.  This information is to help you find resources for any of the problems found today that may be affecting your health:                  Living healthy guide: www.[facility].[place].[facility]      Understanding Diabetes: www.diabetes.org      Eating healthy: www.cdc.gov/healthyweight      [facility] home safety checklist: www.cdc.gov/steadi/patient.html      Agency on Aging: www.goea.[place]      Alcoholics anonymous (AA): www.aa.org      Physical Activity: www.[place].nih.gov/wf6idmj      Tobacco use: www.quitwithusla.org

## 2025-06-30 RX ORDER — ROSUVASTATIN CALCIUM 40 MG/1
40 TABLET, COATED ORAL NIGHTLY
Qty: 90 TABLET | Refills: 0 | Status: SHIPPED | OUTPATIENT
Start: 2025-06-30

## 2025-06-30 NOTE — TELEPHONE ENCOUNTER
Refill Routing Note   Medication(s) are not appropriate for processing by [facility] for the following reason(s):        Patient not seen by provider within 15 months    [facility] action(s):  Defer          Appointments  past 12m or future 3m with PCP    Date Provider   Last Visit   11/6/2023 Columba Carrizales MD   Next Visit   Visit date not found Columba Carrizales MD   ED visits in past 90 days: 0        Note composed:2:07 PM 06/30/2025

## 2025-07-16 NOTE — PROGRESS NOTES
"Subjective:      Patient ID: Dani Gastelum Jr. is a 69 y.o. male.    Chief Complaint:  Post-op Hypothyroidism s/p thyroidectomy for Papillary thyroid carcinoma.      History of Present Illness  66 yo male w/ diagnosis of PTC, HTN, HLD and Beta thalasemia that presents to the endocrine clinic for follow up of thyroid cancer and hypothyroidism. Last visit 1/2022.     In regards to Thyroid CA:    -Thyroid US 12/2019:  MNG w/ largest nodule on Left thyroid meassuring 1.7 x 1.1 x 1.7 cm, (7) hypoechoic solid-appearing w/ blurred margins and a 1.5cm complex predominantly solid left thyroid nodule. The right thyroid nodules are sub-centimeter   -FNA  on 3/2020 of Left inferior 1.7cm thyroid nodule:  Positive for Papillary thyroid Carcinoma   -No Lymph node evaluation on Thyroid US done on 12/2019     Last Thyroid Ultrasound 8/2022:  FINDINGS:  THYROID GLAND has been surgically removed.  Sonographic examination of neck compartments II through V was carried out.  No pathologic lymphadenopathy identified.     Impression:  Status post thyroidectomy.  No sonographic evidence of malignancy in this patient with an undetectable serum thyroglobulin.     S/p total thyroidectomy with left central neck dissection on 10/16/2020:  1. Thyroid, total thyroidectomy:  - Papillary thyroid carcinoma, 2.5 cm (pT2)  - Margin uninvolved by carcinoma (0.1 cm to thyroid surface)  - Negative for lymphatic invasion or angioinvasion  - Negative for perineural invasion  - Adenomatoid nodules  - Biopsy site changes identified  - Two benign lymph node, negative for metastatic carcinoma (0/2)  2. Lymph nodes, designated "Left central neck contents", neck dissection:  - Fourteen benign lymph nodes, negative for metastatic carcinoma (0/14)     Currently Medication Regimen:  Levothyroxine 150 mcg daily    Takes thyroid hormone on empty stomach and hasn't missed any doses.   Has had more fatigue recently.  BM's regular. No heat or cold intolerance.   Denies " heart palpitations and tremor.     He has difficulty losing weight. Exercises at least 4-5 days per week.      He's noted some mild dysphagia to large pills, but no other issues and no choking. Denies any voice changes.      Latest Reference Range & Units 06/24/21 09:30 01/20/22 09:35 12/07/22 09:15   TSH 0.400 - 4.000 uIU/mL 1.143 0.988 1.846   Thyroglobulin Antibody Screen <1.8 IU/mL <1.8 <1.8 <1.8   Thyroglobulin, Tumor Marker ng/mL <0.1 <0.1 <0.1       Review of Systems:  as above    Objective:   Physical Exam  Constitutional:       Appearance: He is well-developed.   HENT:      Head: Normocephalic.   Eyes:      Conjunctiva/sclera: Conjunctivae normal.   Neck:      Thyroid: No thyroid mass, thyromegaly or thyroid tenderness.      Comments: Absent thyroid gland  Pulmonary:      Effort: Pulmonary effort is normal.   Musculoskeletal:         General: Normal range of motion.   Skin:     General: Skin is warm.   Neurological:      Mental Status: He is alert and oriented to person, place, and time.       BP Readings from Last 3 Encounters:   07/17/25 120/82   05/28/25 114/72   05/22/25 122/73     Wt Readings from Last 1 Encounters:   07/17/25 0916 99.5 kg (219 lb 5.7 oz)       Body mass index is 30.59 kg/m².    Lab Review:   Lab Results   Component Value Date    HGBA1C 5.5 07/02/2024     Lab Results   Component Value Date    CHOL 102 (L) 01/09/2025    HDL 30 (L) 01/09/2025    LDLCALC 64.4 01/09/2025    TRIG 38 01/09/2025    CHOLHDL 29.4 01/09/2025     Lab Results   Component Value Date     02/03/2025    K 4.7 02/03/2025     02/03/2025    CO2 24 02/03/2025    GLU 93 02/03/2025    BUN 18 02/03/2025    CREATININE 1.2 02/03/2025    CALCIUM 9.6 02/03/2025    PROT 7.5 02/03/2025    ALBUMIN 4.0 02/03/2025    BILITOT 0.6 02/03/2025    ALKPHOS 44 02/03/2025    AST 27 02/03/2025    ALT 29 02/03/2025    ANIONGAP 10 02/03/2025    ESTGFRAFRICA >60.0 07/21/2022    EGFRNONAA 56.1 (A) 07/21/2022    TSH 0.851 09/04/2024          Assessment and Plan     Papillary thyroid carcinoma  --patient s/p total thyroidectomy with left CN neck dissection on 10/16/2020 for unifocal papillary thyroid cancer with 0/14 LN positive  --Stage 1, CASI low risk for recurrence  --Reviewed that he is low risk for recurrence and I would not recommend OLIVEIRA ablation   --Post-op Tg level remains undetectable  --Post-op neck ultrasound in 8/2022 was normal  --Will repeat Tg now  --Will repeat ultrasound now and as long as Tg remains undetectable can hold off on future US since he is >2 years out from surgery with undetectable Tg and normal US findings    Acquired hypothyroidism  --Patient with postoperative hypothyroidism  --Clinically having some fatigue, unclear if thyroid related  --Goal TSH 0.4-2.0  --Increase levothyroxine to 150 mcg once daily  --Repeat TSH in now      Lisa Lier, FNP-C [facility]     Case discussed with Dr. Montalvo  Recommendations were discussed with the patient in detail  The patient verbalized understanding and agrees with the plan outlined as above.     Visit today included increased complexity associated with the care of the problems addressed and managing the longitudinal care of the patient due to the serious and/or complex managed problems.

## 2025-07-16 NOTE — ASSESSMENT & PLAN NOTE
--patient s/p total thyroidectomy with left CN neck dissection on 10/16/2020 for unifocal papillary thyroid cancer with 0/14 LN positive  --Stage 1, CASI low risk for recurrence  --Reviewed that he is low risk for recurrence and I would not recommend OLIVEIRA ablation   --Post-op Tg level remains undetectable  --Post-op neck ultrasound in 8/2022 was normal  --Will repeat Tg now  --Will repeat ultrasound now and as long as Tg remains undetectable can hold off on future US since he is >2 years out from surgery with undetectable Tg and normal US findings

## 2025-07-16 NOTE — ASSESSMENT & PLAN NOTE
--Patient with postoperative hypothyroidism  --Clinically having some fatigue, unclear if thyroid related  --Goal TSH 0.4-2.0  --Increase levothyroxine to 150 mcg once daily  --Repeat TSH in now

## 2025-07-17 ENCOUNTER — OFFICE VISIT (OUTPATIENT)
Dept: ENDOCRINOLOGY | Facility: CLINIC | Age: 70
End: 2025-07-17
Payer: MEDICARE

## 2025-07-17 VITALS
OXYGEN SATURATION: 96 % | HEIGHT: 71 IN | HEART RATE: 52 BPM | SYSTOLIC BLOOD PRESSURE: 120 MMHG | DIASTOLIC BLOOD PRESSURE: 82 MMHG | BODY MASS INDEX: 30.71 KG/M2 | WEIGHT: 219.38 LBS

## 2025-07-17 DIAGNOSIS — E03.9 ACQUIRED HYPOTHYROIDISM: Primary | ICD-10-CM

## 2025-07-17 DIAGNOSIS — E89.0 POSTOPERATIVE HYPOTHYROIDISM: ICD-10-CM

## 2025-07-17 DIAGNOSIS — E89.0 POST-OPERATIVE HYPOTHYROIDISM: ICD-10-CM

## 2025-07-17 DIAGNOSIS — C73 PAPILLARY THYROID CARCINOMA: ICD-10-CM

## 2025-07-17 PROCEDURE — 99999 PR PBB SHADOW E&M-EST. PATIENT-LVL IV: CPT | Mod: PBBFAC,,,

## 2025-07-17 RX ORDER — LEVOTHYROXINE SODIUM 150 UG/1
150 TABLET ORAL
Qty: 30 TABLET | Refills: 11 | Status: SHIPPED | OUTPATIENT
Start: 2025-07-17 | End: 2026-07-17

## 2025-07-17 RX ORDER — LEVOTHYROXINE SODIUM 150 UG/1
150 TABLET ORAL
Qty: 90 TABLET | Refills: 3 | OUTPATIENT
Start: 2025-07-17

## 2025-07-17 NOTE — PATIENT INSTRUCTIONS
Return to clinic in 1 year   Repeat ultrasound when able  Labs on the 29th.  Orders Placed This Encounter   Procedures    US Soft Tissue Head Neck    Thyroglobulin    TSH

## 2025-07-25 ENCOUNTER — HOSPITAL ENCOUNTER (OUTPATIENT)
Dept: RADIOLOGY | Facility: HOSPITAL | Age: 70
Discharge: HOME | End: 2025-07-25
Payer: MEDICARE

## 2025-07-25 DIAGNOSIS — E89.0 POSTOPERATIVE HYPOTHYROIDISM: ICD-10-CM

## 2025-07-25 PROCEDURE — 76536 US EXAM OF HEAD AND NECK: CPT | Mod: 26,,, | Performed by: STUDENT IN AN ORGANIZED HEALTH CARE EDUCATION/TRAINING PROGRAM

## 2025-07-25 PROCEDURE — 76536 US EXAM OF HEAD AND NECK: CPT | Mod: TC

## 2025-07-29 ENCOUNTER — LAB VISIT (OUTPATIENT)
Dept: LAB | Facility: HOSPITAL | Age: 70
End: 2025-07-29
Attending: INTERNAL MEDICINE
Payer: MEDICARE

## 2025-07-29 DIAGNOSIS — I10 ESSENTIAL HYPERTENSION: ICD-10-CM

## 2025-07-29 DIAGNOSIS — E78.49 OTHER HYPERLIPIDEMIA: ICD-10-CM

## 2025-07-29 DIAGNOSIS — E89.0 POSTOPERATIVE HYPOTHYROIDISM: ICD-10-CM

## 2025-07-29 LAB
ABSOLUTE EOSINOPHIL (OHS): 0.15 K/UL
ABSOLUTE MONOCYTE (OHS): 0.44 K/UL (ref 0.3–1)
ABSOLUTE NEUTROPHIL COUNT (OHS): 1.53 K/UL (ref 1.8–7.7)
ALBUMIN SERPL BCP-MCNC: 3.9 G/DL (ref 3.5–5.2)
ALP SERPL-CCNC: 39 UNIT/L (ref 40–150)
ALT SERPL W/O P-5'-P-CCNC: 40 UNIT/L (ref 0–55)
ANION GAP (OHS): 7 MMOL/L (ref 8–16)
AST SERPL-CCNC: 36 UNIT/L (ref 0–50)
BASOPHILS # BLD AUTO: 0.02 K/UL
BASOPHILS NFR BLD AUTO: 0.4 %
BILIRUB SERPL-MCNC: 0.8 MG/DL (ref 0.1–1)
BUN SERPL-MCNC: 13 MG/DL (ref 8–23)
CALCIUM SERPL-MCNC: 8.9 MG/DL (ref 8.7–10.5)
CHLORIDE SERPL-SCNC: 108 MMOL/L (ref 95–110)
CO2 SERPL-SCNC: 26 MMOL/L (ref 23–29)
CREAT SERPL-MCNC: 1.4 MG/DL (ref 0.5–1.4)
ERYTHROCYTE [DISTWIDTH] IN BLOOD BY AUTOMATED COUNT: 14.1 % (ref 11.5–14.5)
GFR SERPLBLD CREATININE-BSD FMLA CKD-EPI: 54 ML/MIN/1.73/M2
GLUCOSE SERPL-MCNC: 98 MG/DL (ref 70–110)
HCT VFR BLD AUTO: 40.5 % (ref 40–54)
HGB BLD-MCNC: 12.6 GM/DL (ref 14–18)
IMM GRANULOCYTES # BLD AUTO: 0 K/UL (ref 0–0.04)
IMM GRANULOCYTES NFR BLD AUTO: 0 % (ref 0–0.5)
LYMPHOCYTES # BLD AUTO: 2.85 K/UL (ref 1–4.8)
MCH RBC QN AUTO: 23.9 PG (ref 27–31)
MCHC RBC AUTO-ENTMCNC: 31.1 G/DL (ref 32–36)
MCV RBC AUTO: 77 FL (ref 82–98)
NUCLEATED RBC (/100WBC) (OHS): 0 /100 WBC
PLATELET # BLD AUTO: 234 K/UL (ref 150–450)
PMV BLD AUTO: 9.9 FL (ref 9.2–12.9)
POTASSIUM SERPL-SCNC: 4.1 MMOL/L (ref 3.5–5.1)
PROT SERPL-MCNC: 6.8 GM/DL (ref 6–8.4)
RBC # BLD AUTO: 5.28 M/UL (ref 4.6–6.2)
RELATIVE EOSINOPHIL (OHS): 3 %
RELATIVE LYMPHOCYTE (OHS): 57.1 % (ref 18–48)
RELATIVE MONOCYTE (OHS): 8.8 % (ref 4–15)
RELATIVE NEUTROPHIL (OHS): 30.7 % (ref 38–73)
SODIUM SERPL-SCNC: 141 MMOL/L (ref 136–145)
T4 FREE SERPL-MCNC: 1.34 NG/DL (ref 0.71–1.51)
TSH SERPL-ACNC: 0.06 UIU/ML (ref 0.4–4)
WBC # BLD AUTO: 4.99 K/UL (ref 3.9–12.7)

## 2025-07-29 PROCEDURE — 82310 ASSAY OF CALCIUM: CPT

## 2025-07-29 PROCEDURE — 86800 THYROGLOBULIN ANTIBODY: CPT

## 2025-07-29 PROCEDURE — 36415 COLL VENOUS BLD VENIPUNCTURE: CPT | Mod: PN

## 2025-07-29 PROCEDURE — 84443 ASSAY THYROID STIM HORMONE: CPT

## 2025-07-29 PROCEDURE — 84439 ASSAY OF FREE THYROXINE: CPT

## 2025-07-29 PROCEDURE — 85025 COMPLETE CBC W/AUTO DIFF WBC: CPT

## 2025-07-30 LAB
ENDOCRINOLOGIST REVIEW: NORMAL
THYROGLOB AB SERPL IA-ACNC: <1.8 IU/ML
THYROGLOB SERPL-MCNC: <0.1 NG/ML

## 2025-07-31 ENCOUNTER — RESULTS FOLLOW-UP (OUTPATIENT)
Dept: ENDOCRINOLOGY | Facility: CLINIC | Age: 70
End: 2025-07-31
Payer: MEDICARE

## 2025-07-31 DIAGNOSIS — E89.0 POSTOPERATIVE HYPOTHYROIDISM: ICD-10-CM

## 2025-07-31 DIAGNOSIS — C73 PAPILLARY THYROID CARCINOMA: Primary | ICD-10-CM

## 2025-08-05 ENCOUNTER — OFFICE VISIT (OUTPATIENT)
Dept: INTERNAL MEDICINE | Facility: CLINIC | Age: 70
End: 2025-08-05
Payer: MEDICARE

## 2025-08-05 VITALS
WEIGHT: 218 LBS | RESPIRATION RATE: 16 BRPM | OXYGEN SATURATION: 97 % | BODY MASS INDEX: 30.52 KG/M2 | DIASTOLIC BLOOD PRESSURE: 80 MMHG | SYSTOLIC BLOOD PRESSURE: 110 MMHG | TEMPERATURE: 97 F | HEIGHT: 71 IN | HEART RATE: 55 BPM

## 2025-08-05 DIAGNOSIS — E89.0 POSTOPERATIVE HYPOTHYROIDISM: ICD-10-CM

## 2025-08-05 DIAGNOSIS — R20.2 NUMBNESS AND TINGLING OF BOTH FEET: Primary | ICD-10-CM

## 2025-08-05 DIAGNOSIS — N42.9 DISORDER OF PROSTATE, UNSPECIFIED: ICD-10-CM

## 2025-08-05 DIAGNOSIS — D56.1 THALASSEMIA, BETA: ICD-10-CM

## 2025-08-05 DIAGNOSIS — I10 ESSENTIAL HYPERTENSION: ICD-10-CM

## 2025-08-05 DIAGNOSIS — E78.2 MIXED HYPERLIPIDEMIA: ICD-10-CM

## 2025-08-05 DIAGNOSIS — G47.30 SLEEP APNEA, UNSPECIFIED TYPE: ICD-10-CM

## 2025-08-05 DIAGNOSIS — R79.9 ABNORMAL FINDING OF BLOOD CHEMISTRY, UNSPECIFIED: ICD-10-CM

## 2025-08-05 DIAGNOSIS — R20.0 NUMBNESS AND TINGLING OF BOTH FEET: Primary | ICD-10-CM

## 2025-08-05 PROCEDURE — 99215 OFFICE O/P EST HI 40 MIN: CPT | Mod: S$GLB,,, | Performed by: INTERNAL MEDICINE

## 2025-08-05 PROCEDURE — 1157F ADVNC CARE PLAN IN RCRD: CPT | Mod: CPTII,S$GLB,, | Performed by: INTERNAL MEDICINE

## 2025-08-05 PROCEDURE — 3008F BODY MASS INDEX DOCD: CPT | Mod: CPTII,S$GLB,, | Performed by: INTERNAL MEDICINE

## 2025-08-05 PROCEDURE — 1101F PT FALLS ASSESS-DOCD LE1/YR: CPT | Mod: CPTII,S$GLB,, | Performed by: INTERNAL MEDICINE

## 2025-08-05 PROCEDURE — 3288F FALL RISK ASSESSMENT DOCD: CPT | Mod: CPTII,S$GLB,, | Performed by: INTERNAL MEDICINE

## 2025-08-05 PROCEDURE — 3074F SYST BP LT 130 MM HG: CPT | Mod: CPTII,S$GLB,, | Performed by: INTERNAL MEDICINE

## 2025-08-05 PROCEDURE — 1126F AMNT PAIN NOTED NONE PRSNT: CPT | Mod: CPTII,S$GLB,, | Performed by: INTERNAL MEDICINE

## 2025-08-05 PROCEDURE — 4010F ACE/ARB THERAPY RXD/TAKEN: CPT | Mod: CPTII,S$GLB,, | Performed by: INTERNAL MEDICINE

## 2025-08-05 PROCEDURE — 3079F DIAST BP 80-89 MM HG: CPT | Mod: CPTII,S$GLB,, | Performed by: INTERNAL MEDICINE

## 2025-08-05 PROCEDURE — 99999 PR PBB SHADOW E&M-EST. PATIENT-LVL IV: CPT | Mod: PBBFAC,,, | Performed by: INTERNAL MEDICINE

## 2025-08-06 RX ORDER — LEVOTHYROXINE SODIUM 137 UG/1
137 TABLET ORAL DAILY
Qty: 30 TABLET | Refills: 11 | Status: SHIPPED | OUTPATIENT
Start: 2025-08-06

## 2025-08-12 ENCOUNTER — PROCEDURE VISIT (OUTPATIENT)
Dept: NEUROLOGY | Facility: CLINIC | Age: 70
End: 2025-08-12
Payer: MEDICARE

## 2025-08-12 DIAGNOSIS — R20.2 NUMBNESS AND TINGLING OF BOTH FEET: ICD-10-CM

## 2025-08-12 DIAGNOSIS — R20.0 NUMBNESS AND TINGLING OF BOTH FEET: ICD-10-CM

## 2025-08-12 PROCEDURE — 95910 NRV CNDJ TEST 7-8 STUDIES: CPT | Mod: S$GLB,,, | Performed by: PHYSICAL MEDICINE & REHABILITATION

## 2025-08-12 PROCEDURE — 95886 MUSC TEST DONE W/N TEST COMP: CPT | Mod: S$GLB,,, | Performed by: PHYSICAL MEDICINE & REHABILITATION

## 2025-08-22 ENCOUNTER — OFFICE VISIT (OUTPATIENT)
Facility: CLINIC | Age: 70
End: 2025-08-22
Payer: MEDICARE

## 2025-08-22 ENCOUNTER — LAB VISIT (OUTPATIENT)
Dept: LAB | Facility: HOSPITAL | Age: 70
End: 2025-08-22
Payer: MEDICARE

## 2025-08-22 VITALS
HEART RATE: 57 BPM | BODY MASS INDEX: 30.48 KG/M2 | SYSTOLIC BLOOD PRESSURE: 124 MMHG | DIASTOLIC BLOOD PRESSURE: 78 MMHG | HEIGHT: 71 IN | WEIGHT: 217.69 LBS

## 2025-08-22 DIAGNOSIS — R20.2 NUMBNESS AND TINGLING OF BOTH FEET: Primary | ICD-10-CM

## 2025-08-22 DIAGNOSIS — R20.2 NUMBNESS AND TINGLING OF BOTH FEET: ICD-10-CM

## 2025-08-22 DIAGNOSIS — R20.0 NUMBNESS AND TINGLING OF BOTH FEET: ICD-10-CM

## 2025-08-22 DIAGNOSIS — R20.0 NUMBNESS AND TINGLING OF BOTH FEET: Primary | ICD-10-CM

## 2025-08-22 LAB — FOLATE SERPL-MCNC: 38.9 NG/ML (ref 4–24)

## 2025-08-22 PROCEDURE — 82607 VITAMIN B-12: CPT

## 2025-08-22 PROCEDURE — 82525 ASSAY OF COPPER: CPT

## 2025-08-22 PROCEDURE — 83825 ASSAY OF MERCURY: CPT

## 2025-08-22 PROCEDURE — 36415 COLL VENOUS BLD VENIPUNCTURE: CPT

## 2025-08-22 PROCEDURE — 84207 ASSAY OF VITAMIN B-6: CPT

## 2025-08-22 PROCEDURE — 83521 IG LIGHT CHAINS FREE EACH: CPT

## 2025-08-22 PROCEDURE — 84425 ASSAY OF VITAMIN B-1: CPT

## 2025-08-22 PROCEDURE — 99999 PR PBB SHADOW E&M-EST. PATIENT-LVL III: CPT | Mod: PBBFAC,,,

## 2025-08-22 PROCEDURE — 82746 ASSAY OF FOLIC ACID SERUM: CPT

## 2025-08-22 PROCEDURE — 84630 ASSAY OF ZINC: CPT

## 2025-08-24 RX ORDER — LOSARTAN POTASSIUM 100 MG/1
100 TABLET ORAL
Qty: 90 TABLET | Refills: 3 | Status: SHIPPED | OUTPATIENT
Start: 2025-08-24

## 2025-08-25 LAB
ADDRESS: NORMAL
ARSENIC BLD-MCNC: 2 NG/ML
ATTENDING PHYSICIAN NAME: NORMAL
CADMIUM BLD-MCNC: 0.2 NG/ML
COUNTY OF RESIDENCE: NORMAL
EMPLOYER NAME: NORMAL
FACILITY PHONE #: NORMAL
HX OF OCCUPATION: NORMAL
KAPPA LC FREE SER-MCNC: 1.2 MG/L (ref 0.26–1.65)
KAPPA LC FREE/LAMBDA FREE SER: 2.51 MG/DL (ref 0.33–1.94)
LAMBDA LC FREE SERPL-MCNC: 2.09 MG/DL (ref 0.57–2.63)
LEAD BLDV-MCNC: <1 MCG/DL
M HEALTH CARE PROVIDER PHONE: NORMAL
M PATIENT CITY: NORMAL
MERCURY BLD-MCNC: 3 NG/ML
PHONE #: NORMAL
PROVIDER CITY: NORMAL
PROVIDER POSTAL CODE: NORMAL
PROVIDER STATE: NORMAL
REFER PHYSICIAN ADDR: NORMAL
SPECIMEN SOURCE: NORMAL
VIT B12 SERPL-MCNC: 1121 NG/L (ref 180–914)
W ZINC: 59 UG/DL

## 2025-08-26 LAB — W VITAMIN B6: 38 UG/L

## 2025-08-27 LAB
W COPPER: 1080 UG/L
W VITAMIN B1: 61 UG/L

## (undated) DEVICE — DRESSING GZ SURGICOUNT 4X8

## (undated) DEVICE — SEE MEDLINE ITEM 157194

## (undated) DEVICE — SOL WATER STRL IRR 1000ML

## (undated) DEVICE — SCRUB HIBICLENS 4% CHG 4OZ

## (undated) DEVICE — SUT 3-0 12-18IN SILK

## (undated) DEVICE — ELECTRODE BLD EXT INSUL 1

## (undated) DEVICE — SPONGE KITTNER 1/4X 5/8 L STRL

## (undated) DEVICE — FIBRILLAR ABS HEMOSTAT 4X4

## (undated) DEVICE — SEE MEDLINE ITEM 152622

## (undated) DEVICE — CLIP LIGATING MEDIUM

## (undated) DEVICE — CORD FOR BIPOLAR FORCEPS 12

## (undated) DEVICE — SEE MEDLINE ITEM 157110

## (undated) DEVICE — TUBE EMG NIM 8.0MM TRIVANTAGE

## (undated) DEVICE — SEE MEDLINE ITEM 157150

## (undated) DEVICE — DRAPE STERI INSTRUMENT 1018

## (undated) DEVICE — CONTAINER SPECIMEN STRL 4OZ

## (undated) DEVICE — SUT 4-0 12-18IN SILK BLACK

## (undated) DEVICE — SUT 5-0 MONO P-3 18IN

## (undated) DEVICE — Device

## (undated) DEVICE — SUT VICRYL PLUS 3-0 SH 18IN

## (undated) DEVICE — TRAY DRY SKIN SCRUB PREP

## (undated) DEVICE — CLIP LIGATING HEMOCLP SMALL

## (undated) DEVICE — MARKER SKIN STND TIP BLUE BARR

## (undated) DEVICE — PROBE PRASS SLIM

## (undated) DEVICE — GLOVE BIOGEL ECLIPSE SZ 7

## (undated) DEVICE — POSITIONER HEEL FOAM CONVOLTD

## (undated) DEVICE — HEMOSTAT SURGICEL FIBRLR 1X2IN

## (undated) DEVICE — WARMER DRAPE STERILE LF